# Patient Record
Sex: MALE | Race: WHITE | NOT HISPANIC OR LATINO
[De-identification: names, ages, dates, MRNs, and addresses within clinical notes are randomized per-mention and may not be internally consistent; named-entity substitution may affect disease eponyms.]

---

## 2017-02-01 ENCOUNTER — RECORD ABSTRACTING (OUTPATIENT)
Age: 82
End: 2017-02-01

## 2017-02-01 DIAGNOSIS — I51.9 HEART DISEASE, UNSPECIFIED: ICD-10-CM

## 2017-02-01 DIAGNOSIS — Z85.828 PERSONAL HISTORY OF OTHER MALIGNANT NEOPLASM OF SKIN: ICD-10-CM

## 2017-02-01 DIAGNOSIS — I10 ESSENTIAL (PRIMARY) HYPERTENSION: ICD-10-CM

## 2017-02-01 DIAGNOSIS — E78.5 HYPERLIPIDEMIA, UNSPECIFIED: ICD-10-CM

## 2017-02-01 DIAGNOSIS — Z78.9 OTHER SPECIFIED HEALTH STATUS: ICD-10-CM

## 2017-02-01 RX ORDER — FAMOTIDINE 40 MG/1
TABLET, FILM COATED ORAL
Refills: 0 | Status: ACTIVE | COMMUNITY

## 2017-02-01 RX ORDER — LEVOTHYROXINE SODIUM 0.17 MG/1
TABLET ORAL
Refills: 0 | Status: ACTIVE | COMMUNITY

## 2017-02-01 RX ORDER — ROSUVASTATIN CALCIUM 5 MG/1
TABLET, FILM COATED ORAL
Refills: 0 | Status: ACTIVE | COMMUNITY

## 2017-02-01 RX ORDER — MULTIVITAMIN
TABLET ORAL
Refills: 0 | Status: ACTIVE | COMMUNITY

## 2017-02-01 RX ORDER — FINASTERIDE 5 MG/1
TABLET, FILM COATED ORAL
Refills: 0 | Status: ACTIVE | COMMUNITY

## 2017-02-01 RX ORDER — ASPIRIN 325 MG/1
TABLET, FILM COATED ORAL
Refills: 0 | Status: ACTIVE | COMMUNITY

## 2017-02-01 RX ORDER — VALSARTAN 40 MG/1
TABLET ORAL
Refills: 0 | Status: ACTIVE | COMMUNITY

## 2017-02-01 RX ORDER — ALPRAZOLAM 2 MG/1
TABLET ORAL
Refills: 0 | Status: ACTIVE | COMMUNITY

## 2017-02-01 RX ORDER — SERTRALINE HYDROCHLORIDE 25 MG/1
TABLET, FILM COATED ORAL
Refills: 0 | Status: ACTIVE | COMMUNITY

## 2017-02-15 ENCOUNTER — APPOINTMENT (OUTPATIENT)
Dept: UROLOGY | Facility: CLINIC | Age: 82
End: 2017-02-15

## 2017-03-08 ENCOUNTER — APPOINTMENT (OUTPATIENT)
Dept: DERMATOLOGY | Facility: CLINIC | Age: 82
End: 2017-03-08

## 2017-03-08 RX ORDER — RANOLAZINE 500 MG/1
500 TABLET, FILM COATED, EXTENDED RELEASE ORAL
Refills: 0 | Status: ACTIVE | COMMUNITY

## 2017-03-08 RX ORDER — MULTIVIT-MIN/FOLIC/VIT K/LYCOP 400-300MCG
25 MCG TABLET ORAL
Refills: 0 | Status: ACTIVE | COMMUNITY

## 2017-05-13 ENCOUNTER — RX RENEWAL (OUTPATIENT)
Age: 82
End: 2017-05-13

## 2017-06-16 LAB
APPEARANCE: CLEAR
BACTERIA: NEGATIVE
BILIRUBIN URINE: NEGATIVE
BLOOD URINE: NEGATIVE
COLOR: YELLOW
CORE LAB FLUID CYTOLOGY: NORMAL
GLUCOSE QUALITATIVE U: NORMAL MG/DL
KETONES URINE: NEGATIVE
LEUKOCYTE ESTERASE URINE: NEGATIVE
MICROSCOPIC-UA: NORMAL
NITRITE URINE: NEGATIVE
PH URINE: 6.5
PROTEIN URINE: NEGATIVE MG/DL
PSA SERPL-MCNC: 0.95 NG/ML
RED BLOOD CELLS URINE: 6 /HPF
SPECIFIC GRAVITY URINE: 1.02
SQUAMOUS EPITHELIAL CELLS: 0 /HPF
UROBILINOGEN URINE: NORMAL MG/DL
WHITE BLOOD CELLS URINE: 0 /HPF

## 2017-06-23 ENCOUNTER — APPOINTMENT (OUTPATIENT)
Dept: UROLOGY | Facility: CLINIC | Age: 82
End: 2017-06-23

## 2017-06-23 VITALS
DIASTOLIC BLOOD PRESSURE: 60 MMHG | WEIGHT: 115 LBS | OXYGEN SATURATION: 89 % | HEIGHT: 64 IN | HEART RATE: 79 BPM | TEMPERATURE: 97.9 F | SYSTOLIC BLOOD PRESSURE: 103 MMHG | BODY MASS INDEX: 19.63 KG/M2

## 2017-06-23 DIAGNOSIS — R31.9 HEMATURIA, UNSPECIFIED: ICD-10-CM

## 2017-06-26 PROBLEM — R31.9 HEMATURIA: Status: ACTIVE | Noted: 2017-06-26

## 2017-07-08 LAB
ANION GAP SERPL CALC-SCNC: 16 MMOL/L
APPEARANCE: CLEAR
BACTERIA UR CULT: NORMAL
BACTERIA: NEGATIVE
BILIRUBIN URINE: NEGATIVE
BLOOD URINE: NEGATIVE
BUN SERPL-MCNC: 29 MG/DL
CALCIUM SERPL-MCNC: 8.8 MG/DL
CHLORIDE SERPL-SCNC: 105 MMOL/L
CO2 SERPL-SCNC: 23 MMOL/L
COLOR: YELLOW
CORE LAB FLUID CYTOLOGY: NORMAL
CREAT SERPL-MCNC: 1.36 MG/DL
GLUCOSE QUALITATIVE U: NORMAL MG/DL
GLUCOSE SERPL-MCNC: 83 MG/DL
HYALINE CASTS: 1 /LPF
KETONES URINE: NEGATIVE
LEUKOCYTE ESTERASE URINE: NEGATIVE
MICROSCOPIC-UA: NORMAL
NITRITE URINE: NEGATIVE
PH URINE: 7
POTASSIUM SERPL-SCNC: 4.4 MMOL/L
PROTEIN URINE: NEGATIVE MG/DL
RED BLOOD CELLS URINE: 4 /HPF
SODIUM SERPL-SCNC: 144 MMOL/L
SPECIFIC GRAVITY URINE: 1.01
SQUAMOUS EPITHELIAL CELLS: 0 /HPF
UROBILINOGEN URINE: NORMAL MG/DL
WHITE BLOOD CELLS URINE: 0 /HPF

## 2017-09-06 ENCOUNTER — APPOINTMENT (OUTPATIENT)
Dept: DERMATOLOGY | Facility: CLINIC | Age: 82
End: 2017-09-06
Payer: MEDICARE

## 2017-09-06 DIAGNOSIS — L82.1 OTHER SEBORRHEIC KERATOSIS: ICD-10-CM

## 2017-09-06 PROCEDURE — 17000 DESTRUCT PREMALG LESION: CPT

## 2017-09-06 PROCEDURE — 99213 OFFICE O/P EST LOW 20 MIN: CPT | Mod: 25

## 2017-09-21 ENCOUNTER — MEDICATION RENEWAL (OUTPATIENT)
Age: 82
End: 2017-09-21

## 2017-09-23 ENCOUNTER — RX RENEWAL (OUTPATIENT)
Age: 82
End: 2017-09-23

## 2017-09-25 ENCOUNTER — APPOINTMENT (OUTPATIENT)
Dept: UROLOGY | Facility: CLINIC | Age: 82
End: 2017-09-25
Payer: MEDICARE

## 2017-09-25 PROCEDURE — 96402 CHEMO HORMON ANTINEOPL SQ/IM: CPT

## 2017-09-30 LAB
CORE LAB FLUID CYTOLOGY: NORMAL
PSA SERPL-MCNC: 1.69 NG/ML

## 2017-10-04 ENCOUNTER — RX RENEWAL (OUTPATIENT)
Age: 82
End: 2017-10-04

## 2017-12-26 ENCOUNTER — APPOINTMENT (OUTPATIENT)
Dept: UROLOGY | Facility: CLINIC | Age: 82
End: 2017-12-26
Payer: MEDICARE

## 2017-12-26 DIAGNOSIS — C61 MALIGNANT NEOPLASM OF PROSTATE: ICD-10-CM

## 2017-12-26 PROCEDURE — 99213 OFFICE O/P EST LOW 20 MIN: CPT | Mod: 25

## 2017-12-26 PROCEDURE — 96402 CHEMO HORMON ANTINEOPL SQ/IM: CPT

## 2018-01-18 ENCOUNTER — MEDICATION RENEWAL (OUTPATIENT)
Age: 83
End: 2018-01-18

## 2018-01-19 ENCOUNTER — RX RENEWAL (OUTPATIENT)
Age: 83
End: 2018-01-19

## 2018-03-16 ENCOUNTER — APPOINTMENT (OUTPATIENT)
Dept: DERMATOLOGY | Facility: CLINIC | Age: 83
End: 2018-03-16
Payer: MEDICARE

## 2018-03-16 VITALS — WEIGHT: 125 LBS | HEIGHT: 64 IN | BODY MASS INDEX: 21.34 KG/M2

## 2018-03-16 DIAGNOSIS — Z86.79 PERSONAL HISTORY OF OTHER DISEASES OF THE CIRCULATORY SYSTEM: ICD-10-CM

## 2018-03-16 DIAGNOSIS — Z85.820 PERSONAL HISTORY OF MALIGNANT MELANOMA OF SKIN: ICD-10-CM

## 2018-03-16 DIAGNOSIS — L57.8 OTHER SKIN CHANGES DUE TO CHRONIC EXPOSURE TO NONIONIZING RADIATION: ICD-10-CM

## 2018-03-16 DIAGNOSIS — L81.4 OTHER MELANIN HYPERPIGMENTATION: ICD-10-CM

## 2018-03-16 DIAGNOSIS — F41.8 OTHER SPECIFIED ANXIETY DISORDERS: ICD-10-CM

## 2018-03-16 DIAGNOSIS — Z85.46 PERSONAL HISTORY OF MALIGNANT NEOPLASM OF PROSTATE: ICD-10-CM

## 2018-03-16 DIAGNOSIS — L57.0 ACTINIC KERATOSIS: ICD-10-CM

## 2018-03-16 DIAGNOSIS — Z86.718 PERSONAL HISTORY OF OTHER VENOUS THROMBOSIS AND EMBOLISM: ICD-10-CM

## 2018-03-16 PROCEDURE — 99213 OFFICE O/P EST LOW 20 MIN: CPT | Mod: 25

## 2018-03-16 PROCEDURE — 17000 DESTRUCT PREMALG LESION: CPT

## 2018-03-16 PROCEDURE — 17003 DESTRUCT PREMALG LES 2-14: CPT

## 2018-03-16 RX ORDER — SERTRALINE HYDROCHLORIDE 100 MG/1
100 TABLET, FILM COATED ORAL
Qty: 30 | Refills: 0 | Status: DISCONTINUED | COMMUNITY
Start: 2017-09-17

## 2018-03-16 RX ORDER — LOTEPREDNOL ETABONATE 2 MG/ML
0.2 SUSPENSION/ DROPS OPHTHALMIC
Qty: 5 | Refills: 0 | Status: ACTIVE | COMMUNITY
Start: 2017-09-28

## 2018-03-16 RX ORDER — ALPRAZOLAM 0.25 MG/1
0.25 TABLET ORAL
Qty: 90 | Refills: 0 | Status: ACTIVE | COMMUNITY
Start: 2017-09-21

## 2018-03-16 RX ORDER — PANTOPRAZOLE 40 MG/1
40 TABLET, DELAYED RELEASE ORAL
Qty: 90 | Refills: 0 | Status: ACTIVE | COMMUNITY
Start: 2017-09-19

## 2018-03-16 RX ORDER — VALSARTAN 160 MG/1
160 TABLET, COATED ORAL
Qty: 90 | Refills: 0 | Status: DISCONTINUED | COMMUNITY
Start: 2018-01-19

## 2018-03-16 RX ORDER — BETHANECHOL CHLORIDE 10 MG/1
10 TABLET ORAL
Qty: 60 | Refills: 4 | Status: DISCONTINUED | COMMUNITY
Start: 2017-05-13 | End: 2018-03-16

## 2018-03-16 RX ORDER — OMEPRAZOLE 20 MG/1
TABLET, DELAYED RELEASE ORAL
Refills: 0 | Status: DISCONTINUED | COMMUNITY
End: 2018-03-16

## 2018-03-16 RX ORDER — AZELASTINE HYDROCHLORIDE 137 UG/1
0.1 SPRAY, METERED NASAL
Qty: 30 | Refills: 0 | Status: ACTIVE | COMMUNITY
Start: 2017-09-15

## 2018-03-16 RX ORDER — ROSUVASTATIN CALCIUM 20 MG/1
20 TABLET, FILM COATED ORAL
Qty: 90 | Refills: 0 | Status: DISCONTINUED | COMMUNITY
Start: 2017-09-26

## 2018-04-16 ENCOUNTER — RX RENEWAL (OUTPATIENT)
Age: 83
End: 2018-04-16

## 2018-07-21 ENCOUNTER — RX RENEWAL (OUTPATIENT)
Age: 83
End: 2018-07-21

## 2018-07-26 PROBLEM — Z85.828 HISTORY OF BASAL CELL CARCINOMA: Status: RESOLVED | Noted: 2017-02-01 | Resolved: 2018-07-26

## 2018-09-12 ENCOUNTER — RX RENEWAL (OUTPATIENT)
Age: 83
End: 2018-09-12

## 2018-09-12 RX ORDER — BETHANECHOL CHLORIDE 50 MG/1
TABLET ORAL
Refills: 0 | Status: DISCONTINUED | COMMUNITY
End: 2018-09-12

## 2018-09-14 ENCOUNTER — APPOINTMENT (OUTPATIENT)
Dept: DERMATOLOGY | Facility: CLINIC | Age: 83
End: 2018-09-14

## 2018-10-18 ENCOUNTER — RX RENEWAL (OUTPATIENT)
Age: 83
End: 2018-10-18

## 2018-10-18 RX ORDER — BETHANECHOL CHLORIDE 25 MG/1
25 TABLET ORAL
Qty: 180 | Refills: 0 | Status: ACTIVE | COMMUNITY
Start: 2017-05-12 | End: 1900-01-01

## 2018-12-13 ENCOUNTER — RX RENEWAL (OUTPATIENT)
Age: 83
End: 2018-12-13

## 2018-12-13 RX ORDER — FINASTERIDE 5 MG/1
5 TABLET, FILM COATED ORAL DAILY
Qty: 90 | Refills: 0 | Status: ACTIVE | COMMUNITY
Start: 2017-09-21 | End: 1900-01-01

## 2019-01-23 ENCOUNTER — INPATIENT (INPATIENT)
Facility: HOSPITAL | Age: 84
LOS: 4 days | Discharge: NURSING, REHAB OR LONG-TERM CARE | End: 2019-01-28
Attending: HOSPITALIST | Admitting: HOSPITALIST
Payer: MEDICARE

## 2019-01-23 VITALS
TEMPERATURE: 98 F | DIASTOLIC BLOOD PRESSURE: 92 MMHG | HEIGHT: 61 IN | HEART RATE: 90 BPM | OXYGEN SATURATION: 99 % | SYSTOLIC BLOOD PRESSURE: 137 MMHG | RESPIRATION RATE: 18 BRPM | WEIGHT: 98.11 LBS

## 2019-01-23 LAB
ALBUMIN SERPL ELPH-MCNC: 3.5 G/DL — SIGNIFICANT CHANGE UP (ref 3.3–5)
ALP SERPL-CCNC: 62 U/L — SIGNIFICANT CHANGE UP (ref 40–120)
ALT FLD-CCNC: 36 U/L — SIGNIFICANT CHANGE UP (ref 12–78)
ANION GAP SERPL CALC-SCNC: 8 MMOL/L — SIGNIFICANT CHANGE UP (ref 5–17)
APPEARANCE UR: CLEAR — SIGNIFICANT CHANGE UP
AST SERPL-CCNC: 58 U/L — HIGH (ref 15–37)
BACTERIA # UR AUTO: ABNORMAL
BASOPHILS # BLD AUTO: 0.01 K/UL — SIGNIFICANT CHANGE UP (ref 0–0.2)
BASOPHILS NFR BLD AUTO: 0.1 % — SIGNIFICANT CHANGE UP (ref 0–2)
BILIRUB SERPL-MCNC: 0.8 MG/DL — SIGNIFICANT CHANGE UP (ref 0.2–1.2)
BILIRUB UR-MCNC: ABNORMAL
BUN SERPL-MCNC: 55 MG/DL — HIGH (ref 7–23)
CALCIUM SERPL-MCNC: 8.8 MG/DL — SIGNIFICANT CHANGE UP (ref 8.5–10.1)
CHLORIDE SERPL-SCNC: 104 MMOL/L — SIGNIFICANT CHANGE UP (ref 96–108)
CO2 SERPL-SCNC: 25 MMOL/L — SIGNIFICANT CHANGE UP (ref 22–31)
COLOR SPEC: YELLOW — SIGNIFICANT CHANGE UP
COMMENT - URINE: SIGNIFICANT CHANGE UP
CREAT SERPL-MCNC: 3.08 MG/DL — HIGH (ref 0.5–1.3)
DIFF PNL FLD: ABNORMAL
EOSINOPHIL # BLD AUTO: 0 K/UL — SIGNIFICANT CHANGE UP (ref 0–0.5)
EOSINOPHIL NFR BLD AUTO: 0 % — SIGNIFICANT CHANGE UP (ref 0–6)
EPI CELLS # UR: SIGNIFICANT CHANGE UP
GLUCOSE SERPL-MCNC: 196 MG/DL — HIGH (ref 70–99)
GLUCOSE UR QL: NEGATIVE MG/DL — SIGNIFICANT CHANGE UP
HCT VFR BLD CALC: 29.4 % — LOW (ref 39–50)
HGB BLD-MCNC: 9.5 G/DL — LOW (ref 13–17)
HYPOCHROMIA BLD QL: SLIGHT — SIGNIFICANT CHANGE UP
IMM GRANULOCYTES NFR BLD AUTO: 0.6 % — SIGNIFICANT CHANGE UP (ref 0–1.5)
KETONES UR-MCNC: ABNORMAL
LEUKOCYTE ESTERASE UR-ACNC: ABNORMAL
LYMPHOCYTES # BLD AUTO: 0.43 K/UL — LOW (ref 1–3.3)
LYMPHOCYTES # BLD AUTO: 4.7 % — LOW (ref 13–44)
MACROCYTES BLD QL: SLIGHT — SIGNIFICANT CHANGE UP
MANUAL SMEAR VERIFICATION: SIGNIFICANT CHANGE UP
MCHC RBC-ENTMCNC: 32.3 GM/DL — SIGNIFICANT CHANGE UP (ref 32–36)
MCHC RBC-ENTMCNC: 32.8 PG — SIGNIFICANT CHANGE UP (ref 27–34)
MCV RBC AUTO: 101.4 FL — HIGH (ref 80–100)
MONOCYTES # BLD AUTO: 0.47 K/UL — SIGNIFICANT CHANGE UP (ref 0–0.9)
MONOCYTES NFR BLD AUTO: 5.2 % — SIGNIFICANT CHANGE UP (ref 2–14)
NEUTROPHILS # BLD AUTO: 8.12 K/UL — HIGH (ref 1.8–7.4)
NEUTROPHILS NFR BLD AUTO: 89.4 % — HIGH (ref 43–77)
NITRITE UR-MCNC: POSITIVE
NRBC # BLD: 0 /100 WBCS — SIGNIFICANT CHANGE UP (ref 0–0)
PH UR: 5 — SIGNIFICANT CHANGE UP (ref 5–8)
PLAT MORPH BLD: NORMAL — SIGNIFICANT CHANGE UP
PLATELET # BLD AUTO: 175 K/UL — SIGNIFICANT CHANGE UP (ref 150–400)
POTASSIUM SERPL-MCNC: 4.3 MMOL/L — SIGNIFICANT CHANGE UP (ref 3.5–5.3)
POTASSIUM SERPL-SCNC: 4.3 MMOL/L — SIGNIFICANT CHANGE UP (ref 3.5–5.3)
PROT SERPL-MCNC: 7.4 GM/DL — SIGNIFICANT CHANGE UP (ref 6–8.3)
PROT UR-MCNC: 30 MG/DL
RBC # BLD: 2.9 M/UL — LOW (ref 4.2–5.8)
RBC # FLD: 14.5 % — SIGNIFICANT CHANGE UP (ref 10.3–14.5)
RBC BLD AUTO: ABNORMAL
RBC CASTS # UR COMP ASSIST: SIGNIFICANT CHANGE UP /HPF (ref 0–4)
SODIUM SERPL-SCNC: 137 MMOL/L — SIGNIFICANT CHANGE UP (ref 135–145)
SP GR SPEC: 1.02 — SIGNIFICANT CHANGE UP (ref 1.01–1.02)
TROPONIN I SERPL-MCNC: 0.09 NG/ML — HIGH (ref 0.01–0.04)
UROBILINOGEN FLD QL: 1 MG/DL
WBC # BLD: 9.08 K/UL — SIGNIFICANT CHANGE UP (ref 3.8–10.5)
WBC # FLD AUTO: 9.08 K/UL — SIGNIFICANT CHANGE UP (ref 3.8–10.5)
WBC UR QL: SIGNIFICANT CHANGE UP

## 2019-01-23 PROCEDURE — 72170 X-RAY EXAM OF PELVIS: CPT | Mod: 26

## 2019-01-23 PROCEDURE — 70450 CT HEAD/BRAIN W/O DYE: CPT | Mod: 26

## 2019-01-23 PROCEDURE — 93010 ELECTROCARDIOGRAM REPORT: CPT

## 2019-01-23 PROCEDURE — 71045 X-RAY EXAM CHEST 1 VIEW: CPT | Mod: 26

## 2019-01-23 PROCEDURE — 99285 EMERGENCY DEPT VISIT HI MDM: CPT

## 2019-01-23 RX ORDER — AZELASTINE 137 UG/1
2 SPRAY, METERED NASAL
Qty: 0 | Refills: 0 | COMMUNITY

## 2019-01-23 RX ORDER — FINASTERIDE 5 MG/1
5 TABLET, FILM COATED ORAL DAILY
Qty: 0 | Refills: 0 | Status: DISCONTINUED | OUTPATIENT
Start: 2019-01-23 | End: 2019-01-28

## 2019-01-23 RX ORDER — CEFTRIAXONE 500 MG/1
1000 INJECTION, POWDER, FOR SOLUTION INTRAMUSCULAR; INTRAVENOUS EVERY 24 HOURS
Qty: 0 | Refills: 0 | Status: DISCONTINUED | OUTPATIENT
Start: 2019-01-24 | End: 2019-01-28

## 2019-01-23 RX ORDER — RANOLAZINE 500 MG/1
1 TABLET, FILM COATED, EXTENDED RELEASE ORAL
Qty: 0 | Refills: 0 | COMMUNITY

## 2019-01-23 RX ORDER — BETHANECHOL CHLORIDE 25 MG
1 TABLET ORAL
Qty: 0 | Refills: 0 | COMMUNITY

## 2019-01-23 RX ORDER — SERTRALINE 25 MG/1
100 TABLET, FILM COATED ORAL DAILY
Qty: 0 | Refills: 0 | Status: DISCONTINUED | OUTPATIENT
Start: 2019-01-23 | End: 2019-01-28

## 2019-01-23 RX ORDER — LOTEPREDNOL ETABONATE 2 MG/ML
1 SUSPENSION/ DROPS OPHTHALMIC DAILY
Qty: 0 | Refills: 0 | Status: DISCONTINUED | OUTPATIENT
Start: 2019-01-23 | End: 2019-01-28

## 2019-01-23 RX ORDER — PREGABALIN 225 MG/1
1 CAPSULE ORAL
Qty: 0 | Refills: 0 | COMMUNITY

## 2019-01-23 RX ORDER — CHOLECALCIFEROL (VITAMIN D3) 125 MCG
1000 CAPSULE ORAL DAILY
Qty: 0 | Refills: 0 | Status: DISCONTINUED | OUTPATIENT
Start: 2019-01-23 | End: 2019-01-28

## 2019-01-23 RX ORDER — CEFTRIAXONE 500 MG/1
1000 INJECTION, POWDER, FOR SOLUTION INTRAMUSCULAR; INTRAVENOUS ONCE
Qty: 0 | Refills: 0 | Status: COMPLETED | OUTPATIENT
Start: 2019-01-23 | End: 2019-01-23

## 2019-01-23 RX ORDER — BETHANECHOL CHLORIDE 25 MG
25 TABLET ORAL
Qty: 0 | Refills: 0 | Status: DISCONTINUED | OUTPATIENT
Start: 2019-01-23 | End: 2019-01-28

## 2019-01-23 RX ORDER — LOTEPREDNOL ETABONATE 2 MG/ML
1 SUSPENSION/ DROPS OPHTHALMIC
Qty: 0 | Refills: 0 | COMMUNITY

## 2019-01-23 RX ORDER — AZELASTINE 137 UG/1
2 SPRAY, METERED NASAL
Qty: 0 | Refills: 0 | Status: DISCONTINUED | OUTPATIENT
Start: 2019-01-23 | End: 2019-01-28

## 2019-01-23 RX ORDER — CEFTRIAXONE 500 MG/1
INJECTION, POWDER, FOR SOLUTION INTRAMUSCULAR; INTRAVENOUS
Qty: 0 | Refills: 0 | Status: DISCONTINUED | OUTPATIENT
Start: 2019-01-23 | End: 2019-01-28

## 2019-01-23 RX ORDER — ASPIRIN/CALCIUM CARB/MAGNESIUM 324 MG
81 TABLET ORAL DAILY
Qty: 0 | Refills: 0 | Status: DISCONTINUED | OUTPATIENT
Start: 2019-01-23 | End: 2019-01-24

## 2019-01-23 RX ORDER — LEVOTHYROXINE SODIUM 125 MCG
1 TABLET ORAL
Qty: 0 | Refills: 0 | COMMUNITY

## 2019-01-23 RX ORDER — FINASTERIDE 5 MG/1
1 TABLET, FILM COATED ORAL
Qty: 0 | Refills: 0 | COMMUNITY

## 2019-01-23 RX ORDER — SODIUM CHLORIDE 9 MG/ML
500 INJECTION INTRAMUSCULAR; INTRAVENOUS; SUBCUTANEOUS ONCE
Qty: 0 | Refills: 0 | Status: COMPLETED | OUTPATIENT
Start: 2019-01-23 | End: 2019-01-23

## 2019-01-23 RX ORDER — PANTOPRAZOLE SODIUM 20 MG/1
1 TABLET, DELAYED RELEASE ORAL
Qty: 0 | Refills: 0 | COMMUNITY

## 2019-01-23 RX ORDER — CHOLECALCIFEROL (VITAMIN D3) 125 MCG
1 CAPSULE ORAL
Qty: 0 | Refills: 0 | COMMUNITY

## 2019-01-23 RX ORDER — PANTOPRAZOLE SODIUM 20 MG/1
40 TABLET, DELAYED RELEASE ORAL
Qty: 0 | Refills: 0 | Status: DISCONTINUED | OUTPATIENT
Start: 2019-01-23 | End: 2019-01-28

## 2019-01-23 RX ORDER — ALPRAZOLAM 0.25 MG
0.25 TABLET ORAL
Qty: 0 | Refills: 0 | Status: DISCONTINUED | OUTPATIENT
Start: 2019-01-23 | End: 2019-01-28

## 2019-01-23 RX ORDER — LEVOTHYROXINE SODIUM 125 MCG
75 TABLET ORAL DAILY
Qty: 0 | Refills: 0 | Status: DISCONTINUED | OUTPATIENT
Start: 2019-01-23 | End: 2019-01-28

## 2019-01-23 RX ORDER — SODIUM CHLORIDE 9 MG/ML
1000 INJECTION INTRAMUSCULAR; INTRAVENOUS; SUBCUTANEOUS
Qty: 0 | Refills: 0 | Status: COMPLETED | OUTPATIENT
Start: 2019-01-23 | End: 2019-01-24

## 2019-01-23 RX ORDER — ATORVASTATIN CALCIUM 80 MG/1
80 TABLET, FILM COATED ORAL AT BEDTIME
Qty: 0 | Refills: 0 | Status: DISCONTINUED | OUTPATIENT
Start: 2019-01-23 | End: 2019-01-28

## 2019-01-23 RX ORDER — RANOLAZINE 500 MG/1
1000 TABLET, FILM COATED, EXTENDED RELEASE ORAL
Qty: 0 | Refills: 0 | Status: DISCONTINUED | OUTPATIENT
Start: 2019-01-23 | End: 2019-01-28

## 2019-01-23 RX ORDER — ALPRAZOLAM 0.25 MG
0.25 TABLET ORAL DAILY
Qty: 0 | Refills: 0 | Status: DISCONTINUED | OUTPATIENT
Start: 2019-01-23 | End: 2019-01-28

## 2019-01-23 RX ORDER — SERTRALINE 25 MG/1
1 TABLET, FILM COATED ORAL
Qty: 0 | Refills: 0 | COMMUNITY

## 2019-01-23 RX ADMIN — SODIUM CHLORIDE 500 MILLILITER(S): 9 INJECTION INTRAMUSCULAR; INTRAVENOUS; SUBCUTANEOUS at 18:05

## 2019-01-23 RX ADMIN — CEFTRIAXONE 1000 MILLIGRAM(S): 500 INJECTION, POWDER, FOR SOLUTION INTRAMUSCULAR; INTRAVENOUS at 21:30

## 2019-01-23 NOTE — ED ADULT NURSE REASSESSMENT NOTE - NS ED NURSE REASSESS COMMENT FT1
pt family at bedside, pt a/ox1- confused to time/place. RN Davion turned & repositioned. pt safety maintained, fall precautions in place. will continue to monitor. Alexis

## 2019-01-23 NOTE — H&P ADULT - ASSESSMENT
89 y.o. male with PMH HTN, HLD, nonobstructive CAD, carotid stenosis 99%, skin ca s/p surgery, anxiety, BPH, prostate ca with injection (25 yr ago), hypothyroidism presents with slurred speech and difficulty talking. Pt's daughter Tarsha at bedside with son in law and grand daughter. Pt usually speaks with daughter on daily basis for at least 30 minutes. Today, while talking, pt appears to comprehend, but unable to speak. Pt also with slurred speech. EMS was contacted and per ED note, pt had ?seizure while on the ambulance. Pt denies fever, chills, CP, SOB, abd pain, dysuria, or diarrhea. Pt reports right sided hip pain that is not new. Currently, pt's daughter states the symptoms is almost back to normal. Per daughter, pt initially unable to recognize her and only later would he recognize her. Pt baseline walks walker/cane. Does all ADLs including driving. Pt does have frequent falls recently.     #slurred speech, difficulty speaking concerning for TIA, possible seizure  #confusional state possible toxic encephalopathy vs TIA  -admit to tele  -MRI/A brain  -CT head noted  -EEG  -neuro checks  -neuro consult, Dr Douglas    #CAD with elevated troponin  -likely from NEVA vs cardiac  -serial cardiac enzymes and EKG  -cont ASA, statin  -lipid panel  -cardio consult, Dr Lorenzo    #NEVA/dehydration  #possible UTI  -IV hydration and monitor renal function  -f/u urine culture  -will start ceftriaxone for now and await UCx  -hold ARB    #CAD, carotid stenosis  -cont ASA, statin  -cardio consult    #hypothyroidism  -cont home med    #Anemia  -monitor H/H  -denies GI bleed    #DVT ppx  -SCDs    #advanced care planning  -discussed with pt's daughter Tarsha ([phone], [phone]) regarding advanced directives/code status  -pt with living will and HCP files with her. Files reviewed. Pt's living will states DNR/DNI  -Pt is DNR/DNI  -time: 20 min

## 2019-01-23 NOTE — INPATIENT CERTIFICATION FOR MEDICARE PATIENTS - RISKS OF ADVERSE EVENTS
Concern for neurologic deterioration/Concern for renal deterioration/Concern for cardiopulmonary deterioration/Concern for delay in diagnosis and treatment

## 2019-01-23 NOTE — ED ADULT NURSE NOTE - OBJECTIVE STATEMENT
Pt with increasing AMS over last week, 2 witnessed seizures by EMS.  Pt sleepy but arousable and confused. EMS attempted IV in right forearm, large hematoma noted.

## 2019-01-23 NOTE — ED ADULT TRIAGE NOTE - CHIEF COMPLAINT QUOTE
pt BIBEMS from home for AMS worsening x 1 wk as noticed by daughter. pt had 2 seizures witnessed by EMS en route each lasting less than 1 minute, tremulous activity as per EMS. no head strike, no tongue biting. no hx of seizures. hx of carotid blockage as per daughter.

## 2019-01-23 NOTE — ED ADULT NURSE REASSESSMENT NOTE - NS ED NURSE REASSESS COMMENT FT1
Received report from Stella Singh @ [id number]. Assisted patient with food, family at bedside, awaiting bed placement. Will continue to monitor

## 2019-01-23 NOTE — H&P ADULT - NSHPPHYSICALEXAM_GEN_ALL_CORE
Vital Signs Last 24 Hrs  T(C): 36.8 (23 Jan 2019 14:09), Max: 36.8 (23 Jan 2019 14:09)  T(F): 98.2 (23 Jan 2019 14:09), Max: 98.2 (23 Jan 2019 14:09)  HR: 90 (23 Jan 2019 13:48) (90 - 90)  BP: 137/92 (23 Jan 2019 13:48) (137/92 - 137/92)  BP(mean): --  RR: 18 (23 Jan 2019 13:48) (18 - 18)  SpO2: 99% (23 Jan 2019 13:48) (99% - 99%)    GEN: appears comfortable  Neuro: Alert, conversant, CN grossly nonfocal at this time, no slurring, no facial droop, sensation intact, strength intact  HEENT: NC/AT, EOMI, lower eye lip everted, discharge  Neck: no thyroidmegaly, no JVD  Cardiovascular: S1S2 present, regular rhythm, II/VI systolic murmur  Respiratory: breath sounds normal bilaterally, no wheezing, no rales, no rhonchi  Gastrointestinal: bowel sounds normal, soft, no abdominal tenderness  Musculoskeletal: no muscle tenderness  Extremities: No edema  Skin: No rash

## 2019-01-23 NOTE — ED ADULT NURSE NOTE - NSIMPLEMENTINTERV_GEN_ALL_ED
Implemented All Fall with Harm Risk Interventions:  Frannie to call system. Call bell, personal items and telephone within reach. Instruct patient to call for assistance. Room bathroom lighting operational. Non-slip footwear when patient is off stretcher. Physically safe environment: no spills, clutter or unnecessary equipment. Stretcher in lowest position, wheels locked, appropriate side rails in place. Provide visual cue, wrist band, yellow gown, etc. Monitor gait and stability. Monitor for mental status changes and reorient to person, place, and time. Review medications for side effects contributing to fall risk. Reinforce activity limits and safety measures with patient and family. Provide visual clues: red socks.

## 2019-01-23 NOTE — ED PROVIDER NOTE - MEDICAL DECISION MAKING DETAILS
Pt presenting with AMS, no focal deficits on neuro exam. Will r/o infection vs. CVA vs. metabolic. Will need admission.

## 2019-01-23 NOTE — H&P ADULT - HISTORY OF PRESENT ILLNESS
89 y.o. male with PMH HTN, HLD, nonobstructive CAD, carotid stenosis 99%, skin ca s/p surgery, anxiety, BPH, prostate ca with injection (25 yr ago), hypothyroidism presents with slurred speech and difficulty talking. Pt's daughter Tarsha at bedside with son in law and grand daughter. Pt usually speaks with daughter on daily basis for at least 30 minutes. Today, while talking, pt appears to comprehend, but unable to speak. Pt also with slurred speech. EMS was contacted and per ED note, pt had ?seizure while on the ambulance. Pt denies fever, chills, CP, SOB, abd pain, dysuria, or diarrhea. Pt reports right sided hip pain that is not new. Currently, pt's daughter states the symptoms is almost back to normal. Per daughter, pt initially unable to recognize her and only later would he recognize her. Pt baseline walks walker/cane. Does all ADLs including driving. Pt does have frequent falls recently.     PMH: as above  PSH: skin surgery, eye lid surgery, hernia repair >30 yr ago  Social hx: denies tobacco, EtOH, drugs  Family hx: Father-throat ca, stroke,  age ~65; Mother-stomach ca,  age ~65  ROS: per HPI, poor historian

## 2019-01-23 NOTE — ED PROVIDER NOTE - OBJECTIVE STATEMENT
89 y/o male with PMHx of HTN, carotid stenosis, HLD, skin CA presents to the ED c/o difficulty speaking and AMS this morning. Daughter states that she called the patient this morning, as she does everyday and he told her that he did not feel well and was having difficulty speaking. Daughter reports frequent falls these last few days, not evaluated for falls. Pt also had chills last week which resolved. Daughter states that pt is normally alert and oriented, looks after himself and lives alone. Pt has a hx of dehydration. Denies pain, CP, SOB. NKDA. PCP- Dr. Sanchez. Full HPI unobtainable secondary to AMS.

## 2019-01-23 NOTE — ED ADULT NURSE NOTE - ED STAT RN HANDOFF DETAILS
report given to Padmini MARTINEZ. pt denies pain. vss. pt AOX3. no s/s of acute distress noted. awaiting transport

## 2019-01-23 NOTE — ED ADULT NURSE REASSESSMENT NOTE - NS ED NURSE REASSESS COMMENT FT1
pt resting comfortably in stretcher. denies pain. pt AOX3. family at bedside. report given to Padmini MARTINEZ 3N. awaiting transport.

## 2019-01-24 LAB
ANION GAP SERPL CALC-SCNC: 7 MMOL/L — SIGNIFICANT CHANGE UP (ref 5–17)
BUN SERPL-MCNC: 53 MG/DL — HIGH (ref 7–23)
CALCIUM SERPL-MCNC: 8.6 MG/DL — SIGNIFICANT CHANGE UP (ref 8.5–10.1)
CHLORIDE SERPL-SCNC: 109 MMOL/L — HIGH (ref 96–108)
CHOLEST SERPL-MCNC: 140 MG/DL — SIGNIFICANT CHANGE UP (ref 10–199)
CO2 SERPL-SCNC: 25 MMOL/L — SIGNIFICANT CHANGE UP (ref 22–31)
CREAT SERPL-MCNC: 2.21 MG/DL — HIGH (ref 0.5–1.3)
ESTIMATED AVERAGE GLUCOSE: 105 MG/DL — SIGNIFICANT CHANGE UP (ref 68–114)
GLUCOSE SERPL-MCNC: 104 MG/DL — HIGH (ref 70–99)
HBA1C BLD-MCNC: 5.3 % — SIGNIFICANT CHANGE UP (ref 4–5.6)
HBA1C BLD-MCNC: 5.4 % — SIGNIFICANT CHANGE UP (ref 4–5.6)
HCT VFR BLD CALC: 24.3 % — LOW (ref 39–50)
HDLC SERPL-MCNC: 75 MG/DL — SIGNIFICANT CHANGE UP
HGB BLD-MCNC: 8.1 G/DL — LOW (ref 13–17)
LIPID PNL WITH DIRECT LDL SERPL: 54 MG/DL — SIGNIFICANT CHANGE UP
MCHC RBC-ENTMCNC: 33.3 GM/DL — SIGNIFICANT CHANGE UP (ref 32–36)
MCHC RBC-ENTMCNC: 33.3 PG — SIGNIFICANT CHANGE UP (ref 27–34)
MCV RBC AUTO: 100 FL — SIGNIFICANT CHANGE UP (ref 80–100)
NRBC # BLD: 0 /100 WBCS — SIGNIFICANT CHANGE UP (ref 0–0)
PLATELET # BLD AUTO: 135 K/UL — LOW (ref 150–400)
POTASSIUM SERPL-MCNC: 3.9 MMOL/L — SIGNIFICANT CHANGE UP (ref 3.5–5.3)
POTASSIUM SERPL-SCNC: 3.9 MMOL/L — SIGNIFICANT CHANGE UP (ref 3.5–5.3)
RBC # BLD: 2.43 M/UL — LOW (ref 4.2–5.8)
RBC # FLD: 14.4 % — SIGNIFICANT CHANGE UP (ref 10.3–14.5)
SODIUM SERPL-SCNC: 141 MMOL/L — SIGNIFICANT CHANGE UP (ref 135–145)
TOTAL CHOLESTEROL/HDL RATIO MEASUREMENT: 1.9 RATIO — LOW (ref 3.4–9.6)
TRIGL SERPL-MCNC: 55 MG/DL — SIGNIFICANT CHANGE UP (ref 10–149)
TROPONIN I SERPL-MCNC: 0.14 NG/ML — HIGH (ref 0.01–0.04)
TROPONIN I SERPL-MCNC: 0.16 NG/ML — HIGH (ref 0.01–0.04)
WBC # BLD: 9.57 K/UL — SIGNIFICANT CHANGE UP (ref 3.8–10.5)
WBC # FLD AUTO: 9.57 K/UL — SIGNIFICANT CHANGE UP (ref 3.8–10.5)

## 2019-01-24 PROCEDURE — 70547 MR ANGIOGRAPHY NECK W/O DYE: CPT | Mod: 26

## 2019-01-24 PROCEDURE — 95819 EEG AWAKE AND ASLEEP: CPT | Mod: 26

## 2019-01-24 PROCEDURE — 72198 MR ANGIO PELVIS W/O & W/DYE: CPT | Mod: 26

## 2019-01-24 PROCEDURE — 99223 1ST HOSP IP/OBS HIGH 75: CPT

## 2019-01-24 PROCEDURE — 70551 MRI BRAIN STEM W/O DYE: CPT | Mod: 26

## 2019-01-24 PROCEDURE — 93010 ELECTROCARDIOGRAM REPORT: CPT

## 2019-01-24 RX ADMIN — SODIUM CHLORIDE 75 MILLILITER(S): 9 INJECTION INTRAMUSCULAR; INTRAVENOUS; SUBCUTANEOUS at 00:26

## 2019-01-24 RX ADMIN — Medication 0.25 MILLIGRAM(S): at 05:36

## 2019-01-24 RX ADMIN — Medication 1000 UNIT(S): at 12:24

## 2019-01-24 RX ADMIN — PANTOPRAZOLE SODIUM 40 MILLIGRAM(S): 20 TABLET, DELAYED RELEASE ORAL at 05:36

## 2019-01-24 RX ADMIN — Medication 0.25 MILLIGRAM(S): at 18:04

## 2019-01-24 RX ADMIN — RANOLAZINE 1000 MILLIGRAM(S): 500 TABLET, FILM COATED, EXTENDED RELEASE ORAL at 18:03

## 2019-01-24 RX ADMIN — Medication 81 MILLIGRAM(S): at 12:24

## 2019-01-24 RX ADMIN — FINASTERIDE 5 MILLIGRAM(S): 5 TABLET, FILM COATED ORAL at 12:24

## 2019-01-24 RX ADMIN — ATORVASTATIN CALCIUM 80 MILLIGRAM(S): 80 TABLET, FILM COATED ORAL at 22:50

## 2019-01-24 RX ADMIN — LOTEPREDNOL ETABONATE 1 DROP(S): 2 SUSPENSION/ DROPS OPHTHALMIC at 18:02

## 2019-01-24 RX ADMIN — CEFTRIAXONE 1000 MILLIGRAM(S): 500 INJECTION, POWDER, FOR SOLUTION INTRAMUSCULAR; INTRAVENOUS at 22:50

## 2019-01-24 RX ADMIN — SERTRALINE 100 MILLIGRAM(S): 25 TABLET, FILM COATED ORAL at 12:24

## 2019-01-24 RX ADMIN — AZELASTINE 2 SPRAY(S): 137 SPRAY, METERED NASAL at 18:01

## 2019-01-24 RX ADMIN — ATORVASTATIN CALCIUM 80 MILLIGRAM(S): 80 TABLET, FILM COATED ORAL at 00:17

## 2019-01-24 RX ADMIN — RANOLAZINE 1000 MILLIGRAM(S): 500 TABLET, FILM COATED, EXTENDED RELEASE ORAL at 05:36

## 2019-01-24 RX ADMIN — Medication 75 MICROGRAM(S): at 05:36

## 2019-01-24 RX ADMIN — Medication 25 MILLIGRAM(S): at 05:36

## 2019-01-24 RX ADMIN — Medication 25 MILLIGRAM(S): at 18:03

## 2019-01-24 NOTE — PHYSICAL THERAPY INITIAL EVALUATION ADULT - PERTINENT HX OF CURRENT PROBLEM, REHAB EVAL
presents with slurred speech and difficulty talking. Pt's daughter Tarsha at bedside with son in law and grand daughter. Pt usually speaks with daughter on daily basis for at least 30 minutes. Today, while talking, pt appears to comprehend, but unable to speak. Pt also with slurred speech. EMS was contacted and per ED note, pt had ?seizure while on the ambulance.

## 2019-01-24 NOTE — PROVIDER CONTACT NOTE (OTHER) - ASSESSMENT
Pt h&H also dropped down from 9.5 to 8.1 pt did receive 1 liter bolus and another liter running at 75mL/hr.  pt has no signs of bleeding.

## 2019-01-24 NOTE — PHYSICAL THERAPY INITIAL EVALUATION ADULT - ADDITIONAL COMMENTS
Pt baseline walks walker/cane. Does all ADLs including driving. Pt does have frequent falls recently. Pt baseline walks walker/cane. Does all ADLs including driving. Pt does have frequent falls recently.  Lives in a house alone. no ELIEZER. However laundry room in basement. Dtr provides transport to MD appt. Meals on wheels.

## 2019-01-24 NOTE — SWALLOW BEDSIDE ASSESSMENT ADULT - SWALLOW EVAL: RECOMMENDED DIET
SUGGEST A DASH/TLC SOFT TEXTURE DIET WITH THIN LIQUID CONSISTENCIES AS THIS DIET TYPE BEST ACCOMMODATES HIS OROPHARYNGEAL SWALLOWING PROFILE.

## 2019-01-24 NOTE — SWALLOW BEDSIDE ASSESSMENT ADULT - SWALLOW EVAL: CRITERIA FOR SKILLED INTERVENTION MET
ACUTE SPEECH PATHOLOGY INTERVENTION WOULD NOT CHANGE CLINICAL MANAGEMENT/OUTCOME. HIS ORAL PRESBYPHAGIA/MEMORY DEFICITS ARE FELT TO BE PRE-EXISTING. HE IS SUSPECTEDLY AT COMMUNICATIVE AND OROPHARYNGEAL SWALLOWING BASELINE. COMMUNICATIVE AND SWALLOW POTENTIAL ARE FELT TO BE MAXIMIZED. GIVEN ABOVE, WILL NOT ACTIVELY FOLLOW. RECONSULT PRN.

## 2019-01-24 NOTE — SWALLOW BEDSIDE ASSESSMENT ADULT - ORAL PHASE
Bolus formation/transfer were mildly prolonged but mechanically functional and acceptable for age. The pt cleared oral debris on his own after age acceptable piecemeal deglutition.

## 2019-01-24 NOTE — SWALLOW BEDSIDE ASSESSMENT ADULT - DIET PRIOR TO ADMI
The pt was reportedly on a soft texture diet with thin liquids at home PTH(i,e fish, pasta, etc), as per his daughter. The patient was reportedly on a soft texture diet with thin liquids at home PTH(i,e fish, pasta, etc), as per his daughter.

## 2019-01-24 NOTE — SWALLOW BEDSIDE ASSESSMENT ADULT - ORAL PREPARATORY PHASE
Pt willingly accepted PO and demonstrated functional labial grading on utensils. No dribbling was noted.

## 2019-01-24 NOTE — SWALLOW BEDSIDE ASSESSMENT ADULT - SWALLOW EVAL: DIAGNOSIS
1) The pt demonstrates mild functional Oropharyngeal Presbyphagia with oral presbyphagic features that may appear heightened at times with foods that require mastication in setting of many missing teeth. With that being stated, the patient exhibits sufficient oropharyngeal swallowing preservation for age(89) nonetheless. Oropharyngeal swallowing integrity appears to be stable/at usual state and NO behavioral aspiration signs were exhibited on exam.  2) The pt was alert and interactive but [place]. He was able to verbalize during communicative probes/in conversation while demonstrating functional motor speech abilities without evidence of an overt primary linguistic pathology. However, his speech integrity was somewhat hampered by his many missing teeth and his utterances reflected variably reduced STM which is chronic(memory deficits from dementia). In any case, he was able to verbalize his needs and family feels he is at communicative baseline.

## 2019-01-24 NOTE — CONSULT NOTE ADULT - ASSESSMENT
89 y/o M presented with slurred speech with hx of SATISH stenosis.    Plan;   obtain carotid duplex  continue ASA and statin   follow up cardiology work up   will review all imaging and discuss with family goals of care    Plan discussed with Dr jacobson

## 2019-01-24 NOTE — CONSULT NOTE ADULT - SUBJECTIVE AND OBJECTIVE BOX
Patient is a 89y old  Male who presents with a chief complaint of slurred speech, difficulty talking yesterday      HPI:  89 y.o. male with PMH HTN, HLD, nonobstructive CAD, carotid stenosis 99%, skin ca s/p surgery, anxiety, BPH, prostate ca with injection (25 yr ago), hypothyroidism presents with slurred speech and difficulty talking. Pt's daughter Tarsha at bedside with son in law and grand daughter. Pt usually speaks with daughter on daily basis for at least 30 minutes. Yesterday, while talking, pt appears to comprehend, but unable to speak. Pt also with slurred speech. EMS was contacted and per ED note, pt had ?seizure while on the ambulance. Pt denies fever, chills, CP, SOB, abd pain, dysuria, or diarrhea. Pt reports right sided hip pain that is not new. Currently, pt's daughter states the symptoms is almost back to normal. Per daughter, pt initially unable to recognize her and only later would he recognize her. Pt baseline walks walker/cane. Does all ADLs including driving. Pt does have frequent falls recently. Pt states his daughter takes him for shopping and h/o cardiac problems being followed by Dr. Lorenzo.     PMH: as above  PSH: skin surgery, eye lid surgery, hernia repair >30 yr ago  Social hx: denies tobacco, EtOH, drugs  Family hx: Father-throat ca, stroke,  age ~65; Mother-stomach ca,  age ~65  ROS: per HPI, poor historian (2019 19:06)      PAST MEDICAL & SURGICAL HISTORY:  Skin cancer  Carotid stenosis  HTN (hypertension)      FAMILY HISTORY:      Social Hx:  Nonsmoker, no drug or alcohol use    MEDICATIONS  (STANDING):  ALPRAZolam 0.25 milliGRAM(s) Oral two times a day  aspirin enteric coated 81 milliGRAM(s) Oral daily  atorvastatin 80 milliGRAM(s) Oral at bedtime  azelastine 0.1% Nasal Spray 1 Spray(s) Both Nostrils two times a day  bethanechol 25 milliGRAM(s) Oral two times a day  cefTRIAXone Injectable. 1000 milliGRAM(s) IV Push every 24 hours  cefTRIAXone Injectable.      cholecalciferol 1000 Unit(s) Oral daily  finasteride 5 milliGRAM(s) Oral daily  levothyroxine 75 MICROGram(s) Oral daily  loteprednol 0.2% Ophthalmic Suspension 1 Drop(s) Both EYES daily  pantoprazole    Tablet 40 milliGRAM(s) Oral before breakfast  ranolazine 1000 milliGRAM(s) Oral two times a day  sertraline 100 milliGRAM(s) Oral daily       Allergies    No Known Allergies    ROS: Pertinent positives in HPI, all other ROS were reviewed and are negative.      Vital Signs Last 24 Hrs  T(C): 36.5 (2019 04:50), Max: 36.8 (2019 14:09)  T(F): 97.7 (2019 04:50), Max: 98.2 (2019 14:09)  HR: 78 (2019 04:50) (65 - 90)  BP: 140/66 (2019 04:50) (131/52 - 140/66)  BP(mean): --  RR: 17 (2019 04:50) (16 - 18)  SpO2: 96% (2019 04:50) (95% - 100%)        Constitutional: awake and alert.  HEENT: PERRLA, EOMI,   Neck: Supple.  Respiratory: Breath sounds are clear bilaterally  Cardiovascular: S1 and S2, regular  rhythm  Gastrointestinal: soft, nontender  Extremities:  no edema  Vascular:  Carotid Bruit - no  Musculoskeletal: no joint swelling/tenderness, no abnormal movements  Skin: No rashes    Neurological exam:  HF: A x O x 3.Poor short term recall.  Appropriately interactive, normal affect. Speech fluent, No Aphasia now.  CN: OLGA, EOMI, VFF, facial sensation normal, no NLFD, tongue midline, gag intact.  Motor: No pronator drift, Strength 5/5 in all 4 ext, normal bulk and tone. finger contractures  in left hand    Sens: Intact to light touch   Reflexes: Symmetric and normal . BJ 2+, BR 2+, KJ 1+, AJ 0+, downgoing toes b/l  Coord:  No FNFA, dysmetria  Gait/Balance:  Cannot test    NIHSS: 0      Labs:       141  |  109<H>  |  53<H>  ----------------------------<  104<H>  3.9   |  25  |  2.21<H>    Ca    8.6      2019 04:16    TPro  7.4  /  Alb  3.5  /  TBili  0.8  /  DBili  x   /  AST  58<H>  /  ALT  36  /  AlkPhos  62                          8.1    9.57  )-----------( 135      ( 2019 04:16 )             24.3       Radiology:  - CT Head:    < from: CT Head No Cont (19 @ 14:38) >  IMPRESSION:    No acute intracranial hemorrhage, mass effect or midline shift.    Mild cerebral atrophy, advanced since the CT of 06.    Chronic mild bilateral cerebral white matter microvascular changes

## 2019-01-24 NOTE — SWALLOW BEDSIDE ASSESSMENT ADULT - COMMENTS
The patient was admitted to  with slurred speech which is improving. Neuro is following. This profile is superimposed upon a history of anxiety, memory loss/presumed dementia, CAD, HTN, HLD, carotid stenosis, hypothyroidism, skin cancer s/p surgery and prior prostate cancer/treatment NOS.

## 2019-01-24 NOTE — SWALLOW BEDSIDE ASSESSMENT ADULT - NS SPL SWALLOW CLINIC TRIAL FT
Odynophagia was denied and coarse solids were not offered given his baseline Oral Presbyphagia/many missing teeth.

## 2019-01-24 NOTE — PROGRESS NOTE ADULT - ASSESSMENT
89 y/o M with PMH HTN, HLD, nonobstructive CAD, carotid stenosis 99%, skin ca s/p surgery, anxiety, BPH, prostate ca with injection (25 yr ago), hypothyroidism presented with slurred speech and difficulty talking. Pt had elevated trops in the ED. Concern for TIA vs CVA.       #slurred speech, difficulty speaking concerning for TIA; CVA  #confusional state possible toxic encephalopathy vs TIA  - CT head noted above  - EEG negative, noted above.  - F/u MRI/A brain  - F/u speech eval  - Continue neuro checks  - Neuro and PT consult appreciated  - Will consider d/c to rehab once stable    #CAD with elevated troponin  - likely from NEVA vs Demand Ischemia  - Trop trend: 0.093 -> 0.160v-> 0.138  - serial EKG  - continue ASA, statin  - Lipid panel & A1c wnl  - Cardio consult appreciated    #NEVA likely 2/2 dehydration  - IV hydration and monitor renal function  - hold ARB  - Continue to monitor    #possible UTI  - F/u Urine cx  - Continue Ceftriaxone. Day 1    #CAD, carotid stenosis  - h/o severe AS and or SATISH stenosis. Pt wants conservative care  - cont ASA, statin  - cardio consult appreciated    #hypothyroidism  - cont synthroid    #Anemia likely dilutional  - Continue to monitor    #DVT ppx  -SCDs

## 2019-01-24 NOTE — PROGRESS NOTE ADULT - SUBJECTIVE AND OBJECTIVE BOX
SUBJECTIVE: 89 y/o M with PMH HTN, HLD, nonobstructive CAD, carotid stenosis 99%, skin ca s/p surgery, anxiety, BPH, prostate ca with injection (25 yr ago), hypothyroidism presented with slurred speech and difficulty talking with onset 1/23. As per pt daughter, Pt was able to comprehend but not speak. Pt began slurring his speech which was almost resolved back to baseline in the ED. There was no aggravating or palliating factors. Pt had an episode of ?seizure in the ambulance. Denies tongue biting, shaking, dizziness, lightheaded, abnormal smell or taste. Pt denies fever, chills, CP, SOB, abd pain, dysuria, or diarrhea.     1/24: Pt was seen and examined. Pt has no acute complaints. Speech still seemed slurred to me. Will f/u daughter if that's pt baseline. Pt reports he walks normally with cane at home. Denies any previous seizures. Pt denies aura, photophobia, abnormal taste/smell, LOC. Denies fever, chills, CP, SOB, headache, N/V/D/C.    REVIEW OF SYSTEMS:  CONSTITUTIONAL: No weakness, fevers or chills  EYES/ENT: No visual changes;  No vertigo or throat pain. Slurred speech.  NECK: No pain or stiffness  RESPIRATORY: No cough, wheezing, hemoptysis; No shortness of breath  CARDIOVASCULAR: No chest pain or palpitations  GASTROINTESTINAL: No abdominal or epigastric pain. No nausea, vomiting, or hematemesis; No diarrhea or constipation. No melena or hematochezia.  GENITOURINARY: No dysuria, frequency or hematuria  NEUROLOGICAL: No numbness or weakness  SKIN: No itching, burning, rashes, or lesions   All other review of systems is negative unless indicated above    Vital Signs Last 24 Hrs  T(C): 37 (24 Jan 2019 11:17), Max: 37 (24 Jan 2019 11:17)  T(F): 98.6 (24 Jan 2019 11:17), Max: 98.6 (24 Jan 2019 11:17)  HR: 72 (24 Jan 2019 11:17) (65 - 78)  BP: 116/44 (24 Jan 2019 11:17) (116/44 - 140/66)  RR: 18 (24 Jan 2019 11:17) (16 - 18)  SpO2: 98% (24 Jan 2019 11:17) (95% - 100%)        PHYSICAL EXAM:  Constitutional: NAD, awake and alert, well-developed  HEENT: PERR, EOMI, Normal Hearing, MMM, Asymmetric facial expressions, slurred speech.  Neck: Soft and supple, No LAD, No JVD  Respiratory: Breath sounds are clear bilaterally, No wheezing, rales or rhonchi  Cardiovascular: S1 and S2, regular rate and rhythm, no Murmurs, gallops or rubs  Gastrointestinal: Bowel Sounds present, soft, nontender, nondistended, no guarding, no rebound  Extremities: No peripheral edema  Vascular: 2+ peripheral pulses  Neurological: A/O x 3.  Musculoskeletal: Normal Strength  Skin: No rashes    MEDICATIONS:  MEDICATIONS  (STANDING):  ALPRAZolam 0.25 milliGRAM(s) Oral two times a day  aspirin enteric coated 81 milliGRAM(s) Oral daily  atorvastatin 80 milliGRAM(s) Oral at bedtime  azelastine 0.1% Nasal Spray 1 Spray(s) Both Nostrils two times a day  bethanechol 25 milliGRAM(s) Oral two times a day  cefTRIAXone Injectable. 1000 milliGRAM(s) IV Push every 24 hours  cefTRIAXone Injectable.      cholecalciferol 1000 Unit(s) Oral daily  finasteride 5 milliGRAM(s) Oral daily  levothyroxine 75 MICROGram(s) Oral daily  loteprednol 0.2% Ophthalmic Suspension 1 Drop(s) Both EYES daily  pantoprazole    Tablet 40 milliGRAM(s) Oral before breakfast  ranolazine 1000 milliGRAM(s) Oral two times a day  sertraline 100 milliGRAM(s) Oral daily      LABS: All Labs Reviewed:                        8.1    9.57  )-----------( 135      ( 24 Jan 2019 04:16 )             24.3     01-24    141  |  109<H>  |  53<H>  ----------------------------<  104<H>  3.9   |  25  |  2.21<H>    Ca    8.6      24 Jan 2019 04:16    TPro  7.4  /  Alb  3.5  /  TBili  0.8  /  DBili  x   /  AST  58<H>  /  ALT  36  /  AlkPhos  62  01-23      CARDIAC MARKERS ( 24 Jan 2019 10:51 )  0.138 ng/mL / x     / x     / x     / x      CARDIAC MARKERS ( 24 Jan 2019 04:16 )  0.160 ng/mL / x     / x     / x     / x      CARDIAC MARKERS ( 23 Jan 2019 14:03 )  0.093 ng/mL / x     / x     / x     / x          RADIOLOGY/EKG:    < from: CT Head No Cont (01.23.19 @ 14:38) >  EXAM:  CT BRAIN                            PROCEDURE DATE:  01/23/2019          INTERPRETATION:  CLINICAL INDICATION:  Altered mental status.    TECHNIQUE : Axial CT scanning of the brain was obtained from the skull   base to the vertex without theadministration of intravenous contrast.   Coronal and sagittal reformatted images were subsequently obtained.     COMPARISON: Head CT dated 7/8/2006.    FINDINGS:      There is no loss of the gray-white matter distinction to indicate acute   territorial infarct. No acute intracranial hemorrhage.    There is no hydrocephalus, mass effect or midline shift.    There is prominence of the bilateral sulci and bilateral frontoparietal   extra-axial spaces with vessels coursing through the extra-axial spaces,   consistent with mild cerebral volume loss, advanced since the CT of   7/8/06.    There is nonspecific bilateral cerebral white matter hypodensities,   likely reflective of mild chronic microvascular changes.    Evidence for bilateral cataract surgery, otherwise the visualized orbits   are unremarkable.     The calvarium is intact.    The visualized paranasal sinuses and mastoid air cells are clear.    IMPRESSION:    No acute intracranial hemorrhage, mass effect or midline shift.    Mild cerebral atrophy, advanced since the CT of 7/8/06.    Chronic mild bilateral cerebral white matter microvascular changes.    < end of copied text >      < from: Xray Chest 1 View- PORTABLE-Urgent (01.23.19 @ 15:12) >  EXAM:  XR CHEST PORTABLE URGENT 1V                            PROCEDURE DATE:  01/23/2019          INTERPRETATION:  CLINICAL INDICATION:  AMS    TECHNIQUE: Single view AP chest radiograph was performed.    COMPARISON:  Chest radiograph dated 10/17/2015.    FINDINGS:    Hyperinflated lungs, flattening of the diaphragms, with bilateral upper   lobe lucencies consistent with COPD.    There is redemonstration of small multifocal opacities projecting over   the left lung field and right mid and lower lung fields suggestive of   calcified pleural plaque. There is calcified pleural plaque along the   bilateral lung apices, lateral right lower pleura and lateral left   hemidiaphragm. Findings are likely a sequela of asbestosis exposure.   There is no evidence for pneumothorax, consolidation, vascular congestion   or pleural effusion.    The cardiac silhouette size is within normal limits.    Chronic resorption of the distal right clavicle is again noted.    IMPRESSION:    No infiltrate to suggest pneumonia.    Bilateral scattered calcified pleural plaque, suggestive of asbestosis   exposure.    COPD.    < end of copied text >    < from: Xray Pelvis AP only (01.23.19 @ 20:21) >    EXAM:  XR PELVIS AP ONLY 1-2 VIEWS                            PROCEDURE DATE:  01/23/2019          INTERPRETATION:      Radiograph of the pelvis         CLINICAL INFORMATION:  RIGHT hip  Pain.    TECHNIQUE:  A single frontal view of the pelvis was obtained.    FINDINGS:   No prior similar studies are available for review.    Pelvic bones appear intact.  No fracture is recognized.  The hips are   located.  The sacroiliac joints and pubic symphysis remain intact.  No   pathologic calcifications are seen.  Soft tissues appear intact. Vascular   calcification is seen.    IMPRESSION:   No fracture or dislocation.         < end of copied text >    < from: EEG Awake and Asleep (01.24.19 @ 08:00) >   EXAM:  EEG AWAKE AND ASLEEP        PROCEDURE DATE:  01/24/2019        INTERPRETATION:  EEG # 19 - 44          DOS : 1/24/19       AGE : 89        S : M         LOCATION : 3N    Referring Physician :Dr. Solitario                   Reviewing Physician : Dr. JAMILAH Douglas    This 17-channel EEG recording for this 89-year-old patient with the   history of acute kidney injury and shaking with speech difficulties.   Patient asleep during the test difficulty waking up, able to follow   commands relaxed and asleep. Current medications include alprazolam,   atorvastatin, bethanechol, ceftriaxone, finasteride, levothyroxin,   sertraline, ranolazine.    The background activity consists of bilaterally symmetrical 7-8 Hz 15-30   uV activity occipitally predominant activity which attenuates with eye   opening.     Predominantly patient is sleeping most of the recording and background   activity attenuated and slow activity noted in the range of 4-5 Hz in the   frontotemporal head regions with occasional vertex waves and sleep   spindles.    No focal slowing or epileptiform activity noted.    Hyperventilation was not performed. Stepped photic stimulation did not   demonstrate any abnormality.    Intermittent muscle, electrical artifacts which did obscured the   recording.    Impression : This is mildly abnormal EEG due to background slow activity   suggestive of underlying diffuse cerebral dysfunction probably   appropriate for his age. Patient also asleep most of the recording. If   clinically warranted suggest repeat EEG when patient is more awake.    < end of copied text >

## 2019-01-24 NOTE — CONSULT NOTE ADULT - SUBJECTIVE AND OBJECTIVE BOX
Patient is a 89y old  Male who presents with a chief complaint of slurred speech, difficulty talking (2019 08:37)      HPI:  89 y.o. male with PMH HTN, HLD, nonobstructive CAD, carotid stenosis 99%, skin ca s/p surgery, anxiety, BPH, prostate ca with injection (25 yr ago), hypothyroidism presents with slurred speech and difficulty talking. Pt's daughter Tarsha at bedside with son in law and grand daughter. Pt usually speaks with daughter on daily basis for at least 30 minutes. Today, while talking, pt appears to comprehend, but unable to speak. Pt also with slurred speech. EMS was contacted and per ED note, pt had ?seizure while on the ambulance. Pt denies fever, chills, CP, SOB, abd pain, dysuria, or diarrhea. Pt reports right sided hip pain that is not new. Currently, pt's daughter states the symptoms is almost back to normal. Per daughter, pt initially unable to recognize her and only later would he recognize her. Pt baseline walks walker/cane. Does all ADLs including driving. Pt does have frequent falls recently.     PMH: as above  PSH: skin surgery, eye lid surgery, hernia repair >30 yr ago  Social hx: denies tobacco, EtOH, drugs  Family hx: Father-throat ca, stroke,  age ~65; Mother-stomach ca,  age ~65  ROS: per ngoc PAN historian (2019 19:06)      PAST MEDICAL & SURGICAL HISTORY:  Skin cancer  Carotid stenosis  HTN (hypertension)      PREVIOUS CARDIAC WORKUP:      Echo:  Stress Test:  Cardiac Cath:    ALLERGIES:    No Known Allergies       MEDICATIONS  (STANDING):  ALPRAZolam 0.25 milliGRAM(s) Oral two times a day  aspirin enteric coated 81 milliGRAM(s) Oral daily  atorvastatin 80 milliGRAM(s) Oral at bedtime  azelastine 0.1% Nasal Spray 1 Spray(s) Both Nostrils two times a day  bethanechol 25 milliGRAM(s) Oral two times a day  cefTRIAXone Injectable. 1000 milliGRAM(s) IV Push every 24 hours  cefTRIAXone Injectable.      cholecalciferol 1000 Unit(s) Oral daily  finasteride 5 milliGRAM(s) Oral daily  levothyroxine 75 MICROGram(s) Oral daily  loteprednol 0.2% Ophthalmic Suspension 1 Drop(s) Both EYES daily  pantoprazole    Tablet 40 milliGRAM(s) Oral before breakfast  ranolazine 1000 milliGRAM(s) Oral two times a day  sertraline 100 milliGRAM(s) Oral daily    MEDICATIONS  (PRN):  ALPRAZolam 0.25 milliGRAM(s) Oral daily PRN for anxiety      FAMILY HISTORY:        SOCIAL HISTORY:  .scl     ROS:     .ros    Vital Signs Last 24 Hrs  T(C): 36.5 (2019 04:50), Max: 36.8 (2019 14:09)  T(F): 97.7 (2019 04:50), Max: 98.2 (2019 14:09)  HR: 78 (2019 04:50) (65 - 90)  BP: 140/66 (2019 04:50) (131/52 - 140/66)  BP(mean): --  RR: 17 (2019 04:50) (16 - 18)  SpO2: 96% (2019 04:50) (95% - 100%)    I&O's Summary      PHYSICAL EXAM:    .phy      TELEMETRY:    ECG:    LABS:                          8.1    9.57  )-----------( 135      ( 2019 04:16 )             24.3         141  |  109<H>  |  53<H>  ----------------------------<  104<H>  3.9   |  25  |  2.21<H>    Ca    8.6      2019 04:16    TPro  7.4  /  Alb  3.5  /  TBili  0.8  /  DBili  x   /  AST  58<H>  /  ALT  36  /  AlkPhos  62   @ 04:16  Trop-I  0.160  CK      --  CK-MB   --     @ 14:03  Trop-I  0.093  CK      --  CK-MB   --        Urinalysis Basic - ( 2019 16:10 )    Color: Yellow / Appearance: Clear / S.020 / pH: x  Gluc: x / Ketone: Trace  / Bili: Small / Urobili: 1 mg/dL   Blood: x / Protein: 30 mg/dL / Nitrite: Positive   Leuk Esterase: Trace / RBC: 0-2 /HPF / WBC 3-5   Sq Epi: x / Non Sq Epi: Occasional / Bacteria: Moderate            RADIOLOGY & ADDITIONAL STUDIES: Chief complaint of slurred speech, difficulty talking (24 Jan 2019 08:37)      HPI:  89-year-old male admitted with complaints of slurred speech that has resolved spontaneously. Diagnosed with a TIA. Patient has had a history of severe aortic stenosis and severe right internal carotid artery stenosis. He had refused invasive treatment for both in the past.      PAST MEDICAL & SURGICAL HISTORY:  Skin cancer  SATISH Carotid stenosis  Severe AS  HTN (hypertension)  HLD  CKD      PREVIOUS CARDIAC WORKUP:      Echo:  10/18 Nml EF, severe AS, mod MR, mod TR, moderate PAH  Stress Test:  Cardiac Cath:    ALLERGIES:    No Known Allergies       MEDICATIONS  (STANDING):  ALPRAZolam 0.25 milliGRAM(s) Oral two times a day  aspirin enteric coated 81 milliGRAM(s) Oral daily  atorvastatin 80 milliGRAM(s) Oral at bedtime  azelastine 0.1% Nasal Spray 1 Spray(s) Both Nostrils two times a day  bethanechol 25 milliGRAM(s) Oral two times a day  cefTRIAXone Injectable. 1000 milliGRAM(s) IV Push every 24 hours  cefTRIAXone Injectable.      cholecalciferol 1000 Unit(s) Oral daily  finasteride 5 milliGRAM(s) Oral daily  levothyroxine 75 MICROGram(s) Oral daily  loteprednol 0.2% Ophthalmic Suspension 1 Drop(s) Both EYES daily  pantoprazole    Tablet 40 milliGRAM(s) Oral before breakfast  ranolazine 1000 milliGRAM(s) Oral two times a day  sertraline 100 milliGRAM(s) Oral daily    MEDICATIONS  (PRN):  ALPRAZolam 0.25 milliGRAM(s) Oral daily PRN for anxiety      FAMILY HISTORY:  NC        SOCIAL HISTORY:  Nonsmoker. No ETOH abuse. No illicit drugs.     ROS:     Detailed ten system ROS was performed and was negative except for history as eluded to above.    no fever  no chills  no nausea  no vomiting  no diarrhea  no constipation  no melena  no hematochezia  no chest pain  no palpitations  no sob at rest  no dyspnea on exertion  no cough  no wheezing  no anorexia  no headache  no dizziness  no syncope  no weakness  no myalgia  no dysuria  no polyuria  no hematuria       Vital Signs Last 24 Hrs  T(C): 36.5 (24 Jan 2019 04:50), Max: 36.8 (23 Jan 2019 14:09)  T(F): 97.7 (24 Jan 2019 04:50), Max: 98.2 (23 Jan 2019 14:09)  HR: 78 (24 Jan 2019 04:50) (65 - 90)  BP: 140/66 (24 Jan 2019 04:50) (131/52 - 140/66)  RR: 17 (24 Jan 2019 04:50) (16 - 18)  SpO2: 96% (24 Jan 2019 04:50) (95% - 100%)      PHYSICAL EXAM:    General:                Comfortable, AAO X 3, in no distress.  HEENT:                  Atraumatic, PERRLA, EOMI, conjunctiva clear.    Neck:                     Supple, no adenopathy, no thyromegaly, no JVD, + bruit.  Back:                     Symmetric, non tender.  Chest:                    Clear, B/L symmetric air entry, no tachypnea  Heart:                     S1, S2 normal, +S4, + murmur.  Abdomen:              Soft, non-tender, bowel sounds active. No palpable masses.  Extremities:           no cyanosis, no edema. Peripheral pulses normal.  Skin:                      Skin color, texture normal. No rashes.  Neurologic:            Grossly nonfocal.       TELEMETRY:      ECG:  NSR, PVCs, septal infarct pattern    LABS:                          8.1    9.57  )-----------( 135      ( 24 Jan 2019 04:16 )             24.3     01-24    141  |  109<H>  |  53<H>  ----------------------------<  104<H>  3.9   |  25  |  2.21<H>    Ca    8.6      24 Jan 2019 04:16    TPro  7.4  /  Alb  3.5  /  TBili  0.8  /  DBili  x   /  AST  58<H>  /  ALT  36  /  AlkPhos  62  01-23 01-24 @ 04:16  Trop-I  0.160    01-23 @ 14:03  Trop-I  0.093      RADIOLOGY & ADDITIONAL STUDIES:    Xray Chest 1 View- PORTABLE-Urgent (01.23.19 @ 15:12) >  IMPRESSION:  No infiltrate to suggest pneumonia. Bilateral scattered calcified pleural plaque, suggestive of asbestosis exposure. COPD.    CT Head No Cont (01.23.19 @ 14:38) > IMPRESSION:  No acute intracranial hemorrhage, mass effect or midline shift.  Mild cerebral atrophy, advanced since the CT of 7/8/06.  Chronic mild bilateral cerebral white matter microvascular changes.

## 2019-01-24 NOTE — CONSULT NOTE ADULT - ASSESSMENT
Advanced age pt with known h/o severe AS and or SATISH stenosis. Pt had wanted conservative care  Will suggest medical management.  D/W daughter TIA  SATISH stenosis  Severe AS  Ac on chronic kidney disease.   HTN  HLD        Advanced age pt with known h/o severe AS and severe SATISH stenosis. Pt had wanted conservative care, refuse invasive treatment in the past.   Will suggest medical management.  Hydrate  Follow renal function  Minimally elevated Trop-I. No chest pain. Will follow up.   Echo  Neuro work up  D/W daughter

## 2019-01-24 NOTE — PHYSICAL THERAPY INITIAL EVALUATION ADULT - ASSISTIVE DEVICE FOR TRANSFER: SIT/STAND, REHAB EVAL
1. Please take Protonix 40 mg daily on the empty stomach every morning, 30 minutes prior breakfast.  2. Follow a strict GERD diet  3. Follow-up in one year    Lifestyle and Dietary changes for gastroesophageal reflux disease (GERD):    1. Avoid large meals and eating too fast.   Recommendation: Frequent, small meals throughout the day. For example  eat 6 small meals rather than 3 large meals.     2. Avoid eating and going to bed or lying down.   Recommendation: Wait at least 3 hours after eating or drinking before going to bed/lying down    3. Avoid certain foods known to worsen reflux. Examples include:    · Foods that are high in fat such as fast food, fried food, and pizza. Fatty foods empty more slowly from the stomach, which can increase GERD symptoms.  · Spicy food  · Citrus fruits or juices (lemonade, orange juice, oranges, grapefruits, pineapples, etc.)  · Tomatoes or tomato based products (tomato paste, tomato sauce, salsa)  · Peppermint  · Chocolate    4. Avoid beverages that contain:   · Caffeine, although ONE cup of coffee or tea per day is acceptable  · Carbonation, including ALL soda and carbonated flavored water  · Alcohol because it weakens the lower esophageal sphincter (the point between the esophagus and stomach) allowing more acid to touch the esophagus, thus increasing reflux symptoms.     5. Avoid all tobacco products including cigarettes and smokeless tobacco.  Nicotine weakens the lower esophageal sphincter (the point between the esophagus and stomach) causing increased reflux symptoms.     6. Maintain a healthy weight.  If you are overweight, even losing just 10% of your body weight can decrease GERD symptoms by decreasing the pressure within your abdomen.     7. Exercise 2-3 hours after eating.  Strenuous physical activity immediately after eating can cause stomach acid to reflux into the esophagus. Light activity, such as walking, is generally tolerated.     8. Avoid tight fitting clothes  or belts as that can increase the pressure in your abdomen and cause an increase in GERD symptoms.     9. Avoid bending or straining, especially after meals.         rolling walker

## 2019-01-24 NOTE — CONSULT NOTE ADULT - ATTENDING COMMENTS
89 year old male with high grade SATISH stenosis, unlikely symptomatic.  Known severe AS.  Patient is unlikely to benefit from any carotid revascularization due to limited 5 year survival.  No plans for CEA or PADDY.  Continue high intensity statin and ASA therapy

## 2019-01-24 NOTE — PHYSICAL THERAPY INITIAL EVALUATION ADULT - DIAGNOSIS, PT EVAL
slurred speech, difficulty speaking concerning for TIA, possible seizure;confusional state possible toxic encephalopathy vs TIA

## 2019-01-24 NOTE — SWALLOW BEDSIDE ASSESSMENT ADULT - SLP GENERAL OBSERVATIONS
The pt was alert and interactive but Hughes. He was able to verbalize during communicative probes/in conversation while demonstrating functional motor speech abilities without evidence of an overt primary linguistic pathology. However, his speech integrity was somewhat hampered by his many missing teeth and his utterances reflected variably reduced STM which is chronic(memory deficits from dementia). In any case, he was able to verbalize his needs and family feels he is at communicative baseline.

## 2019-01-24 NOTE — CONSULT NOTE ADULT - SUBJECTIVE AND OBJECTIVE BOX
CC: slurred speech, difficulty talking     HPI:  89 y/o M with PMH listed below presented with slurred speech and difficulty talking on admission. On admission family reports patient was unable to speak and unable to recognize family members which prompted the admission. Patient reports in the past almost getting a CEA but for some reason surgery was cancelled. Patient reports he just takes medication for the SATISH stenosis. Patient reports he ambulates at home with a walker/cane at home. Patient denies recent trauma, falls, dizziness, nausea, vomiting, changes in vision.     On exam patient has some slurred speech but able to make full sentences Patient has no motor deficits. Patient does not recall events that brought him to the hospital.       PMH: HTN, HLD, nonobstructive CAD, carotid stenosis, skin cancer, anxiety, BPH, prostate, hypothyroidism   PSH: skin surgery, eye lid surgery, hernia repair >30 yr ago  Social hx: denies tobacco, EtOH, drugs  Family hx: Father-throat ca, stroke,  age ~65; Mother-stomach ca,  age ~65    Vitals;   ICU Vital Signs Last 24 Hrs  T(C): 37 (2019 11:17), Max: 37 (2019 11:17)  T(F): 98.6 (2019 11:17), Max: 98.6 (2019 11:17)  HR: 72 (2019 11:17) (65 - 78)  BP: 116/44 (2019 11:17) (116/44 - 140/66)  BP(mean): --  ABP: --  ABP(mean): --  RR: 18 (2019 11:17) (16 - 18)  SpO2: 98% (2019 11:17) (95% - 100%)    Physical exam:   Constitutional: NAD, awake and alert, well-developed  HEENT: PERR, EOMI, mild slurred speech.  Neck: Soft and supple, No LAD, No JVD  Respiratory: CTA b/l , No wheezing, rales or rhonchi  Cardiovascular: S1 and S2, RRR, no Murmurs, gallops or rubs  Gastrointestinal: Bowel Sounds present, soft, NT, ND, No RGR   Extremities: No peripheral edema  Vascular: 2+ peripheral pulses  Neurological: A/O x 3.  Musculoskeletal: Normal Strength  Skin: No rashes    Labs;                         8.1    9.57  )-----------( 135      ( 2019 04:16 )             24.3       141  |  109<H>  |  53<H>  ----------------------------<  104<H>  3.9   |  25  |  2.21<H>    Ca    8.6      2019 04:16    TPro  7.4  /  Alb  3.5  /  TBili  0.8  /  DBili  x   /  AST  58<H>  /  ALT  36  /  AlkPhos  62        imaging:   EXAM:  MR ANGIO BRAIN                          EXAM:  MR ANGIO NECK                          EXAM:  MR BRAIN                            PROCEDURE DATE:  2019          INTERPRETATION:  Exam Type: MRI BRAIN, MRA COW, MRA NECK WITHOUT CONTRAST   Indication: slurred speech  Comparison:CT head 2019    Technique: Multiplanar MR imaging of the brain was obtained without   contrast. Time of flight MRA of the neck and [place] were   obtained.    Findings:    Brain: There is diffuse FLAIR hyperintense signal throughout the sulci of   the bilateral cerebri and a tiny subdural collection overlying the right   occipital lobe, nonspecific, may reflect hemorrhage or cellular material   from infection or inflammation. There are bilateral whole hemispheric   subdural fluid collections measuring up to 0.9 cm overlying the bilateral   frontal lobes. No acute infarct. There is no mass effect or midline   shift.  Cerebellar tonsils are in normal location. The intracranial flow   voids are normal in appearance. Visualized paranasal sinuses and mastoids   are normal in signal.    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 There is a normal congenital variant   anastomosis from the cavernous segment of the left internal carotid   artery to the basilar artery that remains widely patent. The posterior   cerebral arteries are grossly patent. The anterior circulation is grossly   patent. Diffuse atherosclerotic changes of theintracranial circulation   are present.      MRA of the neck:  High-grade focal stenosis of the proximal right   internal carotid artery with distal reconstitution of flow. Right common   carotid artery and right external carotid artery are unremarkable. Left   common carotid artery, left internal carotid artery and left external   carotid artery are grossly intact.        IMPRESSION:    MRI brain: Diffuse FLAIR hyperintense signal throughout the sulci of the   bilateral cerebri and a tiny subdural collection overlying the right   occipital lobe, nonspecific, may reflect hemorrhage or cellular material   from infection or inflammation. There are bilateral whole hemispheric   subdural fluid collections measuring up to 0.9 cm overlying the bilateral   frontal lobes. No significant mass effect or midline shift.    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 There is a normal congenital variant anastomosis from the   cavernous segment of the left internal carotid artery to the basilar   artery that remains widely patent.     MRA neck: High-grade focal stenosis of the proximal right internal   carotid artery with distal reconstitution of flow.

## 2019-01-24 NOTE — PHYSICAL THERAPY INITIAL EVALUATION ADULT - GENERAL OBSERVATIONS, REHAB EVAL
Pre and post session: supine in bed with bed alarm on. +telemetry and IV in place. call bell and phone in reach. No c/o pain. speech is clear and intelligible. Tolerated well and would benefit from further skilled PT for functional  mobility training.

## 2019-01-24 NOTE — SWALLOW BEDSIDE ASSESSMENT ADULT - PHARYNGEAL PHASE
Swallow triggered in acceptable time frame for age. Laryngeal lift on palpation during swallowing trials was very mildly reduced but felt to be functional and within age acceptable parameters. No behavioral aspiration signs exhibited on exam.

## 2019-01-24 NOTE — CONSULT NOTE ADULT - ASSESSMENT
89 y.o. male with PMH HTN, HLD, nonobstructive CAD, carotid stenosis 99%, skin ca s/p surgery, anxiety, BPH, prostate ca with injection (25 yr ago), hypothyroidism presents with slurred speech and difficulty talking. Pt also with slurred speech. EMS was contacted and per ED note, pt had ?seizure while on the ambulance.    -slurred speech, difficulty speaking R/o  TIA, possible seizure in Ambulance  -confusional state possible toxic encephalopathy vs TIA  -Agree with MRI and MRA  -EEG  -Correct underlying metabolic causes.  -Pt / speech evaluation.  - Consult Dr. Lorenzo.  - Continue Aspirin and  statin.  - Will f/u.

## 2019-01-25 ENCOUNTER — TRANSCRIPTION ENCOUNTER (OUTPATIENT)
Age: 84
End: 2019-01-25

## 2019-01-25 LAB
ANION GAP SERPL CALC-SCNC: 7 MMOL/L — SIGNIFICANT CHANGE UP (ref 5–17)
BASOPHILS # BLD AUTO: 0.01 K/UL — SIGNIFICANT CHANGE UP (ref 0–0.2)
BASOPHILS NFR BLD AUTO: 0.2 % — SIGNIFICANT CHANGE UP (ref 0–2)
BUN SERPL-MCNC: 34 MG/DL — HIGH (ref 7–23)
CALCIUM SERPL-MCNC: 8.3 MG/DL — LOW (ref 8.5–10.1)
CHLORIDE SERPL-SCNC: 110 MMOL/L — HIGH (ref 96–108)
CO2 SERPL-SCNC: 24 MMOL/L — SIGNIFICANT CHANGE UP (ref 22–31)
CREAT SERPL-MCNC: 1.56 MG/DL — HIGH (ref 0.5–1.3)
EOSINOPHIL # BLD AUTO: 0.07 K/UL — SIGNIFICANT CHANGE UP (ref 0–0.5)
EOSINOPHIL NFR BLD AUTO: 1.2 % — SIGNIFICANT CHANGE UP (ref 0–6)
GLUCOSE SERPL-MCNC: 82 MG/DL — SIGNIFICANT CHANGE UP (ref 70–99)
HCT VFR BLD CALC: 29.8 % — LOW (ref 39–50)
HGB BLD-MCNC: 9.5 G/DL — LOW (ref 13–17)
IMM GRANULOCYTES NFR BLD AUTO: 0.3 % — SIGNIFICANT CHANGE UP (ref 0–1.5)
LYMPHOCYTES # BLD AUTO: 0.46 K/UL — LOW (ref 1–3.3)
LYMPHOCYTES # BLD AUTO: 7.6 % — LOW (ref 13–44)
MCHC RBC-ENTMCNC: 31.9 GM/DL — LOW (ref 32–36)
MCHC RBC-ENTMCNC: 32.5 PG — SIGNIFICANT CHANGE UP (ref 27–34)
MCV RBC AUTO: 102.1 FL — HIGH (ref 80–100)
MONOCYTES # BLD AUTO: 0.41 K/UL — SIGNIFICANT CHANGE UP (ref 0–0.9)
MONOCYTES NFR BLD AUTO: 6.8 % — SIGNIFICANT CHANGE UP (ref 2–14)
NEUTROPHILS # BLD AUTO: 5.06 K/UL — SIGNIFICANT CHANGE UP (ref 1.8–7.4)
NEUTROPHILS NFR BLD AUTO: 83.9 % — HIGH (ref 43–77)
NRBC # BLD: 0 /100 WBCS — SIGNIFICANT CHANGE UP (ref 0–0)
PLATELET # BLD AUTO: 140 K/UL — LOW (ref 150–400)
POTASSIUM SERPL-MCNC: 4.1 MMOL/L — SIGNIFICANT CHANGE UP (ref 3.5–5.3)
POTASSIUM SERPL-SCNC: 4.1 MMOL/L — SIGNIFICANT CHANGE UP (ref 3.5–5.3)
RBC # BLD: 2.92 M/UL — LOW (ref 4.2–5.8)
RBC # FLD: 14.3 % — SIGNIFICANT CHANGE UP (ref 10.3–14.5)
SODIUM SERPL-SCNC: 141 MMOL/L — SIGNIFICANT CHANGE UP (ref 135–145)
WBC # BLD: 6.03 K/UL — SIGNIFICANT CHANGE UP (ref 3.8–10.5)
WBC # FLD AUTO: 6.03 K/UL — SIGNIFICANT CHANGE UP (ref 3.8–10.5)

## 2019-01-25 PROCEDURE — 99233 SBSQ HOSP IP/OBS HIGH 50: CPT

## 2019-01-25 PROCEDURE — 99222 1ST HOSP IP/OBS MODERATE 55: CPT

## 2019-01-25 RX ORDER — HEPARIN SODIUM 5000 [USP'U]/ML
5000 INJECTION INTRAVENOUS; SUBCUTANEOUS EVERY 12 HOURS
Qty: 0 | Refills: 0 | Status: DISCONTINUED | OUTPATIENT
Start: 2019-01-25 | End: 2019-01-28

## 2019-01-25 RX ORDER — ASPIRIN/CALCIUM CARB/MAGNESIUM 324 MG
81 TABLET ORAL DAILY
Qty: 0 | Refills: 0 | Status: DISCONTINUED | OUTPATIENT
Start: 2019-01-25 | End: 2019-01-28

## 2019-01-25 RX ADMIN — Medication 0.25 MILLIGRAM(S): at 05:28

## 2019-01-25 RX ADMIN — FINASTERIDE 5 MILLIGRAM(S): 5 TABLET, FILM COATED ORAL at 13:11

## 2019-01-25 RX ADMIN — Medication 0.25 MILLIGRAM(S): at 17:41

## 2019-01-25 RX ADMIN — Medication 25 MILLIGRAM(S): at 05:27

## 2019-01-25 RX ADMIN — Medication 25 MILLIGRAM(S): at 17:39

## 2019-01-25 RX ADMIN — ATORVASTATIN CALCIUM 80 MILLIGRAM(S): 80 TABLET, FILM COATED ORAL at 22:37

## 2019-01-25 RX ADMIN — LOTEPREDNOL ETABONATE 1 DROP(S): 2 SUSPENSION/ DROPS OPHTHALMIC at 12:00

## 2019-01-25 RX ADMIN — Medication 1000 UNIT(S): at 13:12

## 2019-01-25 RX ADMIN — RANOLAZINE 1000 MILLIGRAM(S): 500 TABLET, FILM COATED, EXTENDED RELEASE ORAL at 17:39

## 2019-01-25 RX ADMIN — Medication 75 MICROGRAM(S): at 05:27

## 2019-01-25 RX ADMIN — PANTOPRAZOLE SODIUM 40 MILLIGRAM(S): 20 TABLET, DELAYED RELEASE ORAL at 05:29

## 2019-01-25 RX ADMIN — HEPARIN SODIUM 5000 UNIT(S): 5000 INJECTION INTRAVENOUS; SUBCUTANEOUS at 22:37

## 2019-01-25 RX ADMIN — CEFTRIAXONE 1000 MILLIGRAM(S): 500 INJECTION, POWDER, FOR SOLUTION INTRAMUSCULAR; INTRAVENOUS at 17:47

## 2019-01-25 RX ADMIN — Medication 81 MILLIGRAM(S): at 17:41

## 2019-01-25 RX ADMIN — AZELASTINE 2 SPRAY(S): 137 SPRAY, METERED NASAL at 05:28

## 2019-01-25 RX ADMIN — SERTRALINE 100 MILLIGRAM(S): 25 TABLET, FILM COATED ORAL at 13:11

## 2019-01-25 RX ADMIN — RANOLAZINE 1000 MILLIGRAM(S): 500 TABLET, FILM COATED, EXTENDED RELEASE ORAL at 05:27

## 2019-01-25 NOTE — DISCHARGE NOTE ADULT - MEDICATION SUMMARY - MEDICATIONS TO TAKE
I will START or STAY ON the medications listed below when I get home from the hospital:    finasteride 5 mg oral tablet  -- 1 tab(s) by mouth once a day  -- Indication: For BPH (benign prostatic hyperplasia)    aspirin 81 mg oral tablet, chewable  -- 1 tab(s) by mouth once a day  -- Indication: For Analgesic    losartan 25 mg oral tablet  -- 1 tab(s) by mouth once a day  -- Indication: For Hypertension    Ranexa 1000 mg oral tablet, extended release  -- 1 tab(s) by mouth 2 times a day  -- Indication: For Anti-anginal     sertraline 100 mg oral tablet  -- 1 tab(s) by mouth once a day  -- Indication: For depression    atorvastatin 80 mg oral tablet  -- 1 tab(s) by mouth once a day (at bedtime)  -- Indication: For Coronary artery disease    clopidogrel 75 mg oral tablet  -- 1 tab(s) by mouth once a day  -- Indication: For Coronary artery disease    ALPRAZolam 0.25 mg oral tablet  -- 1 tab(s) by mouth 2 times a day  -- Indication: For Anxiety    ALPRAZolam 0.25 mg oral tablet  -- 1 tab(s) by mouth once a day, As needed, for anxiety  -- Indication: For Anxiety    metoprolol tartrate 25 mg oral tablet  -- 1 tab(s) by mouth 2 times a day  -- Indication: For Hypertension/Coronary artery disease    bethanechol 25 mg oral tablet  -- 1 tab(s) by mouth 2 times a day  -- Indication: For urinary retention     azelastine 137 mcg/inh (0.1%) nasal spray  -- 2 spray(s) into nose 2 times a day  -- Indication: For nasal spray    Alrex 0.2% ophthalmic suspension  -- 1 drop(s) in each eye once a day  -- Indication: For eye drops    pantoprazole 40 mg oral delayed release tablet  -- 1 tab(s) by mouth once a day  -- Indication: For GERD/PPI    levothyroxine 75 mcg (0.075 mg) oral tablet  -- 1 tab(s) by mouth once a day  -- Indication: For Hypothyroidism    cholecalciferol 1000 intl units oral tablet  -- 1 tab(s) by mouth once a day  -- Indication: For vitamin supplementation    cyanocobalamin 1000 mcg sublingual tablet  -- 1 tab(s) by mouth once a day  -- Indication: For vitamin supplementation I will START or STAY ON the medications listed below when I get home from the hospital:    finasteride 5 mg oral tablet  -- 1 tab(s) by mouth once a day  -- Indication: For BPH (benign prostatic hyperplasia)    aspirin 81 mg oral tablet, chewable  -- 1 tab(s) by mouth once a day  -- Indication: For Analgesic    Ranexa 1000 mg oral tablet, extended release  -- 1 tab(s) by mouth 2 times a day  -- Indication: For Anti-anginal     sertraline 100 mg oral tablet  -- 1 tab(s) by mouth once a day  -- Indication: For depression    atorvastatin 80 mg oral tablet  -- 1 tab(s) by mouth once a day (at bedtime)  -- Indication: For Coronary artery disease    clopidogrel 75 mg oral tablet  -- 1 tab(s) by mouth once a day  -- Indication: For Coronary artery disease    ALPRAZolam 0.25 mg oral tablet  -- 1 tab(s) by mouth 2 times a day  -- Indication: For Anxiety    ALPRAZolam 0.25 mg oral tablet  -- 1 tab(s) by mouth once a day, As needed, for anxiety  -- Indication: For Anxiety    metoprolol tartrate 25 mg oral tablet  -- 1 tab(s) by mouth 2 times a day  -- Indication: For Hypertension/Coronary artery disease    Norvasc 2.5 mg oral tablet  -- 1 tab(s) by mouth once a day   -- It is very important that you take or use this exactly as directed.  Do not skip doses or discontinue unless directed by your doctor.  Some non-prescription drugs may aggravate your condition.  Read all labels carefully.  If a warning appears, check with your doctor before taking.    -- Indication: For Hypertension    bethanechol 25 mg oral tablet  -- 1 tab(s) by mouth 2 times a day  -- Indication: For urinary retention     azelastine 137 mcg/inh (0.1%) nasal spray  -- 2 spray(s) into nose 2 times a day  -- Indication: For nasal spray    Alrex 0.2% ophthalmic suspension  -- 1 drop(s) in each eye once a day  -- Indication: For eye drops    pantoprazole 40 mg oral delayed release tablet  -- 1 tab(s) by mouth once a day  -- Indication: For GERD/PPI    levothyroxine 75 mcg (0.075 mg) oral tablet  -- 1 tab(s) by mouth once a day  -- Indication: For Hypothyroidism    cholecalciferol 1000 intl units oral tablet  -- 1 tab(s) by mouth once a day  -- Indication: For vitamin supplementation    cyanocobalamin 1000 mcg sublingual tablet  -- 1 tab(s) by mouth once a day  -- Indication: For vitamin supplementation I will START or STAY ON the medications listed below when I get home from the hospital:    finasteride 5 mg oral tablet  -- 1 tab(s) by mouth once a day  -- Indication: For BPH (benign prostatic hyperplasia)    aspirin 81 mg oral tablet, chewable  -- 1 tab(s) by mouth once a day  -- Indication: For Analgesic    Ranexa 1000 mg oral tablet, extended release  -- 1 tab(s) by mouth 2 times a day  -- Indication: For Anti-anginal     sertraline 100 mg oral tablet  -- 1 tab(s) by mouth once a day  -- Indication: For depression    atorvastatin 80 mg oral tablet  -- 1 tab(s) by mouth once a day (at bedtime)  -- Indication: For Coronary artery disease    clopidogrel 75 mg oral tablet  -- 1 tab(s) by mouth once a day  -- Indication: For Coronary artery disease    ALPRAZolam 0.25 mg oral tablet  -- 1 tab(s) by mouth 2 times a day  -- Indication: For Anxiety    ALPRAZolam 0.25 mg oral tablet  -- 1 tab(s) by mouth once a day, As needed, for anxiety  -- Indication: For Anxiety    metoprolol tartrate 25 mg oral tablet  -- 1 tab(s) by mouth 2 times a day  -- Indication: For Hypertension/Coronary artery disease    amLODIPine 5 mg oral tablet  -- 1 tab(s) by mouth once a day   -- It is very important that you take or use this exactly as directed.  Do not skip doses or discontinue unless directed by your doctor.  Some non-prescription drugs may aggravate your condition.  Read all labels carefully.  If a warning appears, check with your doctor before taking.    -- Indication: For HTN (hypertension)    bethanechol 25 mg oral tablet  -- 1 tab(s) by mouth 2 times a day  -- Indication: For urinary retention     azelastine 137 mcg/inh (0.1%) nasal spray  -- 2 spray(s) into nose 2 times a day  -- Indication: For nasal spray    Alrex 0.2% ophthalmic suspension  -- 1 drop(s) in each eye once a day  -- Indication: For eye drops    pantoprazole 40 mg oral delayed release tablet  -- 1 tab(s) by mouth once a day  -- Indication: For GERD/PPI    levothyroxine 75 mcg (0.075 mg) oral tablet  -- 1 tab(s) by mouth once a day  -- Indication: For Hypothyroidism    cholecalciferol 1000 intl units oral tablet  -- 1 tab(s) by mouth once a day  -- Indication: For vitamin supplementation    cyanocobalamin 1000 mcg sublingual tablet  -- 1 tab(s) by mouth once a day  -- Indication: For vitamin supplementation

## 2019-01-25 NOTE — CONSULT NOTE ADULT - REASON FOR ADMISSION
slurred speech, difficulty talking

## 2019-01-25 NOTE — PROGRESS NOTE ADULT - SUBJECTIVE AND OBJECTIVE BOX
Pt has been seen and examined with FP resident, resident supervised agree with a/p       Patient is a 89y old  Male who presents with a chief complaint of slurred speech, difficulty talking (25 Jan 2019 11:35)        HPI:  89 y.o. male with PMH HTN, HLD, nonobstructive CAD, carotid stenosis 99%, skin ca s/p surgery, anxiety, BPH, prostate ca with injection (25 yr ago), hypothyroidism presents with slurred speech and difficulty talking.    PHYSICAL EXAM:  Vital Signs Last 24 Hrs  T(C): 36.4 (25 Jan 2019 11:02), Max: 36.4 (25 Jan 2019 11:02)  T(F): 97.5 (25 Jan 2019 11:02), Max: 97.5 (25 Jan 2019 11:02)  HR: 62 (25 Jan 2019 11:02) (62 - 70)  BP: 142/67 (25 Jan 2019 11:02) (119/45 - 164/56)  BP(mean): --  RR: 18 (25 Jan 2019 11:02) (18 - 18)  SpO2: 95% (25 Jan 2019 11:02) (95% - 96%)  general- comfortable   -rs-aeeb,cta  -cvs-s1s2 normal   -p/a-soft,bs+          A/P    #Ct asa/statin, neurology evaluation for mri finding     #Discussed with Dr. Douglas    #ct supportive care, improvement in speech noted today

## 2019-01-25 NOTE — DISCHARGE NOTE ADULT - SECONDARY DIAGNOSIS.
NEVA (acute kidney injury) HTN (hypertension) Carotid stenosis Hypothyroidism BPH (benign prostatic hyperplasia) Anxiety

## 2019-01-25 NOTE — PROGRESS NOTE ADULT - SUBJECTIVE AND OBJECTIVE BOX
CURRENT CARDIAC WORKUP:       Echo:  Stress Test:  Cardiac Cath:    Allergies:   No Known Allergies      MEDICATIONS  (STANDING):  ALPRAZolam 0.25 milliGRAM(s) Oral two times a day  atorvastatin 80 milliGRAM(s) Oral at bedtime  azelastine 0.1% Nasal Spray 2 Spray(s) Both Nostrils two times a day  bethanechol 25 milliGRAM(s) Oral two times a day  cefTRIAXone Injectable. 1000 milliGRAM(s) IV Push every 24 hours  cefTRIAXone Injectable.      cholecalciferol 1000 Unit(s) Oral daily  finasteride 5 milliGRAM(s) Oral daily  levothyroxine 75 MICROGram(s) Oral daily  loteprednol 0.2% Ophthalmic Suspension 1 Drop(s) Both EYES daily  pantoprazole    Tablet 40 milliGRAM(s) Oral before breakfast  ranolazine 1000 milliGRAM(s) Oral two times a day  sertraline 100 milliGRAM(s) Oral daily    MEDICATIONS  (PRN):  ALPRAZolam 0.25 milliGRAM(s) Oral daily PRN for anxiety      ROS:     .ros      Vital Signs Last 24 Hrs  T(C): 36.3 (2019 05:20), Max: 37 (2019 11:17)  T(F): 97.4 (2019 05:20), Max: 98.6 (2019 11:17)  HR: 63 (2019 05:20) (63 - 72)  BP: 164/56 (2019 05:20) (116/44 - 164/56)  BP(mean): --  RR: 18 (2019 05:20) (18 - 18)  SpO2: 95% (2019 05:20) (95% - 98%)    I&O's Summary      PHYSICAL EXAM:    .phy      INTERPRETATION OF TELEMETRY:    ECG:    LABS:                        9.5    6.03  )-----------( 140      ( 2019 05:45 )             29.8     -25    141  |  110<H>  |  34<H>  ----------------------------<  82  4.1   |  24  |  1.56<H>    Ca    8.3<L>      2019 05:45    TPro  7.4  /  Alb  3.5  /  TBili  0.8  /  DBili  x   /  AST  58<H>  /  ALT  36  /  AlkPhos  62        Lipid Panel  Chl 140  HDL 75  LDL 54  Trg 55         @ 10:51  Trop-I 0.138  CK     --  CK MB  --     @ 04:16  Trop-I 0.160  CK     --  CK MB  --     @ 14:03  Trop-I 0.093  CK     --  CK MB  --        Urinalysis Basic - ( 2019 16:10 )    Color: Yellow / Appearance: Clear / S.020 / pH: x  Gluc: x / Ketone: Trace  / Bili: Small / Urobili: 1 mg/dL   Blood: x / Protein: 30 mg/dL / Nitrite: Positive   Leuk Esterase: Trace / RBC: 0-2 /HPF / WBC 3-5   Sq Epi: x / Non Sq Epi: Occasional / Bacteria: Moderate            RADIOLOGY & ADDITIONAL STUDIES: 89-year-old male admitted with complaints of slurred speech that has resolved spontaneously. Diagnosed with a TIA. Patient has had a history of severe aortic stenosis and severe right internal carotid artery stenosis. He had refused invasive treatment for both in the past.    Today, no new events. Yesterday had a brief paroxysm of atrial flutter, spontaneously converted to sinus    PAST MEDICAL & SURGICAL HISTORY:  Skin cancer  SATISH Carotid stenosis  Severe AS  HTN (hypertension)  HLD  CKD      PREVIOUS CARDIAC WORKUP:      Echo:  10/18 Nml EF, severe AS, mod MR, mod TR, moderate PAH  Stress Test:  Cardiac Cath:    Allergies:   No Known Allergies      MEDICATIONS  (STANDING):  ALPRAZolam 0.25 milliGRAM(s) Oral two times a day  atorvastatin 80 milliGRAM(s) Oral at bedtime  azelastine 0.1% Nasal Spray 2 Spray(s) Both Nostrils two times a day  bethanechol 25 milliGRAM(s) Oral two times a day  cefTRIAXone Injectable. 1000 milliGRAM(s) IV Push every 24 hours  cefTRIAXone Injectable.      cholecalciferol 1000 Unit(s) Oral daily  finasteride 5 milliGRAM(s) Oral daily  levothyroxine 75 MICROGram(s) Oral daily  loteprednol 0.2% Ophthalmic Suspension 1 Drop(s) Both EYES daily  pantoprazole    Tablet 40 milliGRAM(s) Oral before breakfast  ranolazine 1000 milliGRAM(s) Oral two times a day  sertraline 100 milliGRAM(s) Oral daily    MEDICATIONS  (PRN):  ALPRAZolam 0.25 milliGRAM(s) Oral daily PRN for anxiety      ROS:     Detailed ten system ROS was performed and was negative except for history as eluded to above.    no fever  no chills  no nausea  no vomiting  no diarrhea  no constipation  no melena  no hematochezia  no chest pain  no palpitations  no sob at rest  no dyspnea on exertion  no cough  no wheezing  no anorexia  no headache  no dizziness  no syncope  no weakness  no myalgia  no dysuria  no polyuria  no hematuria       Vital Signs Last 24 Hrs  T(C): 36.3 (25 Jan 2019 05:20), Max: 37 (24 Jan 2019 11:17)  T(F): 97.4 (25 Jan 2019 05:20), Max: 98.6 (24 Jan 2019 11:17)  HR: 63 (25 Jan 2019 05:20) (63 - 72)  BP: 164/56 (25 Jan 2019 05:20) (116/44 - 164/56)  BP(mean): --  RR: 18 (25 Jan 2019 05:20) (18 - 18)  SpO2: 95% (25 Jan 2019 05:20) (95% - 98%)    I&O's Summary      PHYSICAL EXAM:    General:                Comfortable, AAO X 3, in no distress.  HEENT:                  Atraumatic, PERRLA, EOMI, conjunctiva clear.    Neck:                     Supple, no adenopathy, no thyromegaly, no JVD, + bruit.  Back:                     Symmetric, non tender.  Chest:                    Clear, B/L symmetric air entry, no tachypnea  Heart:                     S1, S2 normal, +S4, + murmur.  Abdomen:              Soft, non-tender, bowel sounds active. No palpable masses.  Extremities:           no cyanosis, no edema. Peripheral pulses normal.  Skin:                      Skin color, texture normal. No rashes.  Neurologic:            Grossly nonfocal.       TELEMETRY:      ECG:  NSR, PVCs, septal infarct pattern      LABS:                        9.5    6.03  )-----------( 140      ( 25 Jan 2019 05:45 )             29.8     01-25    141  |  110<H>  |  34<H>  ----------------------------<  82  4.1   |  24  |  1.56<H>    Ca    8.3<L>      25 Jan 2019 05:45    TPro  7.4  /  Alb  3.5  /  TBili  0.8  /  DBili  x   /  AST  58<H>  /  ALT  36  /  AlkPhos  62  01-23      Lipid Panel  Chl 140  HDL 75  LDL 54  Trg 55        01-24 @ 10:51  Trop-I 0.138    01-24 @ 04:16  Trop-I  0.160    01-23 @ 14:03  Trop-I  0.093        RADIOLOGY & ADDITIONAL STUDIES:    MRI brain and MR Angio Neck, Head No Cont (01.24.19 @ 19:37) >  IMPRESSION:    MRI brain: Diffuse FLAIR hyperintense signal throughout the sulci of the bilateral cerebri and a tiny subdural collection overlying the right occipital lobe, nonspecific, may reflect hemorrhage or cellular material from infection or inflammation. There are bilateral whole hemispheric subdural fluid collections measuring up to 0.9 cm overlying the bilateral frontal lobes. No significant mass effect or midline shift.    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 There is a normal congenital variant anastomosis from the cavernous segment of the left internal carotid artery to the basilar artery that remains widely patent.     MRA neck: High-grade focal stenosis of the proximal right internal carotid artery with distal reconstitution of flow.

## 2019-01-25 NOTE — PROVIDER CONTACT NOTE (OTHER) - SITUATION
PA and office aware
?seizure, likely stroke  Service aware - [facility]
OFFICE IS AWARE OF CONSULT
Pt troponin raised from 0.093 to 0.160. pt vitals are /66 HR 78 temp 97.7 resp: 16 and o2 sat 96%. pt states he is not feeling chest heaviness or pain. no tingling down his arm or numbness.

## 2019-01-25 NOTE — PROGRESS NOTE ADULT - SUBJECTIVE AND OBJECTIVE BOX
SUBJECTIVE: 89 y/o M with PMH HTN, HLD, nonobstructive CAD, carotid stenosis 99%, skin ca s/p surgery, anxiety, BPH, prostate ca with injection (25 yr ago), hypothyroidism presented with slurred speech and difficulty talking with onset 1/23. As per pt daughter, Pt was able to comprehend but not speak. Pt began slurring his speech which was almost resolved back to baseline in the ED. There was no aggravating or palliating factors. Pt had an episode of ?seizure in the ambulance. Denies tongue biting, shaking, dizziness, lightheaded, abnormal smell or taste. Pt denies fever, chills, CP, SOB, abd pain, dysuria, or diarrhea.     1/25: Pt was seen and examined. Pt speech has improved compared to yesterday. Pt endorses as well and so is the daughter. Pt this morning reported left shoulder pain 2/2 sleeping on his hand. Reports its improving. No other acute complaints. No issues overnight. Pt MRI results explained to him and family. Pt does not want any surgery to be done but agrees to see neurosurgeon to know what they recommend. Neurosurgery PA has been notified and will see pt. Denies fever, chills, CP, SOB, headache, N/V/D/C.    REVIEW OF SYSTEMS:  CONSTITUTIONAL: No weakness, fevers or chills  EYES/ENT: No visual changes;  No vertigo or throat pain   NECK: No pain or stiffness  RESPIRATORY: No cough, wheezing, hemoptysis; No shortness of breath  CARDIOVASCULAR: No chest pain or palpitations  GASTROINTESTINAL: No abdominal or epigastric pain. No nausea, vomiting, or hematemesis; No diarrhea or constipation. No melena or hematochezia.  GENITOURINARY: No dysuria, frequency or hematuria  NEUROLOGICAL: No numbness or weakness  SKIN: No itching, burning, rashes, or lesions   All other review of systems is negative unless indicated above    Vital Signs Last 24 Hrs  T(C): 36.4 (25 Jan 2019 11:02), Max: 36.4 (25 Jan 2019 11:02)  T(F): 97.5 (25 Jan 2019 11:02), Max: 97.5 (25 Jan 2019 11:02)  HR: 62 (25 Jan 2019 11:02) (62 - 70)  BP: 142/67 (25 Jan 2019 11:02) (119/45 - 164/56)  RR: 18 (25 Jan 2019 11:02) (18 - 18)  SpO2: 95% (25 Jan 2019 11:02) (95% - 96%)      PHYSICAL EXAM:  Constitutional: NAD, awake and alert, well-developed  HEENT: PERR, EOMI, Normal Hearing, MMM  Neck: Soft and supple, No LAD, No JVD  Respiratory: Breath sounds are clear bilaterally, No wheezing, rales or rhonchi  Cardiovascular: S1 and S2, regular rate and rhythm  Gastrointestinal: Bowel Sounds present, soft, nontender, nondistended, no guarding, no rebound  Extremities: No peripheral edema  Vascular: 2+ peripheral pulses  Neurological: A/O x 3, no focal deficits  Musculoskeletal: Normal strength, appropriate for age  Skin: No rashes    MEDICATIONS:  MEDICATIONS  (STANDING):  ALPRAZolam 0.25 milliGRAM(s) Oral two times a day  atorvastatin 80 milliGRAM(s) Oral at bedtime  azelastine 0.1% Nasal Spray 2 Spray(s) Both Nostrils two times a day  bethanechol 25 milliGRAM(s) Oral two times a day  cefTRIAXone Injectable. 1000 milliGRAM(s) IV Push every 24 hours  cefTRIAXone Injectable.      cholecalciferol 1000 Unit(s) Oral daily  finasteride 5 milliGRAM(s) Oral daily  levothyroxine 75 MICROGram(s) Oral daily  loteprednol 0.2% Ophthalmic Suspension 1 Drop(s) Both EYES daily  pantoprazole    Tablet 40 milliGRAM(s) Oral before breakfast  ranolazine 1000 milliGRAM(s) Oral two times a day  sertraline 100 milliGRAM(s) Oral daily      LABS: All Labs Reviewed:                        9.5    6.03  )-----------( 140      ( 25 Jan 2019 05:45 )             29.8     01-25    141  |  110<H>  |  34<H>  ----------------------------<  82  4.1   |  24  |  1.56<H>    Ca    8.3<L>      25 Jan 2019 05:45        CARDIAC MARKERS ( 24 Jan 2019 10:51 )  0.138 ng/mL / x     / x     / x     / x      CARDIAC MARKERS ( 24 Jan 2019 04:16 )  0.160 ng/mL / x     / x     / x     / x            RADIOLOGY/EKG:    < from: MR Angio Neck No Cont (01.24.19 @ 19:37) >  EXAM:  MR ANGIO BRAIN                          EXAM:  MR ANGIO NECK                          EXAM:  MR BRAIN                            PROCEDURE DATE:  01/24/2019          INTERPRETATION:  Exam Type: MRI BRAIN, MRA COW, MRA NECK WITHOUT CONTRAST   Indication: slurred speech  Comparison:CT head 1/23/2019    Technique: Multiplanar MR imaging of the brain was obtained without   contrast. Time of flight MRA of the neck and [place] were   obtained.    Findings:    Brain: There is diffuse FLAIR hyperintense signal throughout the sulci of   the bilateral cerebri and a tiny subdural collection overlying the right   occipital lobe, nonspecific, may reflect hemorrhage or cellular material   from infection or inflammation. There are bilateral whole hemispheric   subdural fluid collections measuring up to 0.9 cm overlying the bilateral   frontal lobes. No acute infarct. There is no mass effect or midline   shift.  Cerebellar tonsils are in normal location. The intracranial flow   voids are normal in appearance. Visualized paranasal sinuses and mastoids   are normal in signal.    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 There is a normal congenital variant   anastomosis from the cavernous segment of the left internal carotid   artery to the basilar artery that remains widely patent. The posterior   cerebral arteries are grossly patent. The anterior circulation is grossly   patent. Diffuse atherosclerotic changes of theintracranial circulation   are present.      MRA of the neck:  High-grade focal stenosis of the proximal right   internal carotid artery with distal reconstitution of flow. Right common   carotid artery and right external carotid artery are unremarkable. Left   common carotid artery, left internal carotid artery and left external   carotid artery are grossly intact.        IMPRESSION:    MRI brain: Diffuse FLAIR hyperintense signal throughout the sulci of the   bilateral cerebri and a tiny subdural collection overlying the right   occipital lobe, nonspecific, may reflect hemorrhage or cellular material   from infection or inflammation. There are bilateral whole hemispheric   subdural fluid collections measuring up to 0.9 cm overlying the bilateral   frontal lobes. No significant mass effect or midline shift.    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 There is a normal congenital variant anastomosis from the   cavernous segment of the left internal carotid artery to the basilar   artery that remains widely patent.     MRA neck: High-grade focal stenosis of the proximal right internal   carotid artery with distal reconstitution of flow.     These findings were discussed with Dr. Yates at 8:00 PM on 1/24/2019.    < end of copied text >

## 2019-01-25 NOTE — DISCHARGE NOTE ADULT - OTHER SIGNIFICANT FINDINGS
CT HEAD  1/23/19    IMPRESSION:    No acute intracranial hemorrhage, mass effect or midline shift.    Mild cerebral atrophy, advanced since the CT of 7/8/06.    Chronic mild bilateral cerebral white matter microvascular changes.      MR HEAD NON CONT  1/24/19    IMPRESSION:    MRI brain: Diffuse FLAIR hyperintense signal throughout the sulci of the   bilateral cerebri and a tiny subdural collection overlying the right   occipital lobe, nonspecific, may reflect hemorrhage or cellular material   from infection or inflammation. There are bilateral whole hemispheric   subdural fluid collections measuring up to 0.9 cm overlying the bilateral   frontal lobes. No significant mass effect or midline shift.    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 There is a normal congenital variant anastomosis from the   cavernous segment of the left internal carotid artery to the basilar   artery that remains widely patent.     MRA neck: High-grade focal stenosis of the proximal right internal   carotid artery with distal reconstitution of flow.

## 2019-01-25 NOTE — PROGRESS NOTE ADULT - ASSESSMENT
Assessment and Recommendation:   · Assessment		  89 y.o. male with PMH HTN, HLD, nonobstructive CAD, carotid stenosis 99%, skin ca s/p surgery, anxiety, BPH, prostate ca with injection (25 yr ago), hypothyroidism presents with slurred speech and difficulty talking. Pt also with slurred speech. EMS was contacted and per ED note, pt had ?seizure while on the ambulance.    -slurred speech, difficulty speaking R/o  TIA, possible seizure in Ambulance  -confusional state possible toxic encephalopathy vs TIA  -Agree with MRI Ch Subdural collection bifrontal ? atrophy? traumatic. If family wants consider NS consult or F/u scan as out Pt  - MRA RT ICA stenosis known in past as per Dr. Ordoñez. Seen by Vascular surgery Rec compare to prior Doppler results.  -EEG mild slow appropriate for age.  -Correct underlying metabolic causes.  -Pt  for ambulation  - Consult Dr. Lorenzo done  - Continue Aspirin and  statin.  - Willl discuss with Dr. Nelson  -D/c planning

## 2019-01-25 NOTE — DISCHARGE NOTE ADULT - NSTOBACCOHOTLINE_GEN_A_CS
[facility] Smokers Quitline (731-SO-GGJDT) [facility] Smokers Quitline (006-OR-BNKKV) [facility] Smokers Quitline ([phone]-WT-[id number])

## 2019-01-25 NOTE — DISCHARGE NOTE ADULT - MEDICATION SUMMARY - MEDICATIONS TO STOP TAKING
I will STOP taking the medications listed below when I get home from the hospital:    rosuvastatin 20 mg oral tablet  -- 1 tab(s) by mouth once a day (at bedtime) I will STOP taking the medications listed below when I get home from the hospital:    losartan 25 mg oral tablet  -- 1 tab(s) by mouth once a day    rosuvastatin 20 mg oral tablet  -- 1 tab(s) by mouth once a day (at bedtime)

## 2019-01-25 NOTE — DISCHARGE NOTE ADULT - CARE PROVIDER_API CALL
Bro Sanchez), Medicine  [address] [place] [address]  Phone: [phone]  Fax: [phone]    Zak Lorenzo (MD), Cardiovascular Disease; Internal Medicine  [address] [place] [address]  Phone: [phone]  Fax: [phone] Bro Sanchez), Medicine  [address] [place] [address]  Phone: [phone]  Fax: [phone]    Zak Lorenzo (MD), Cardiovascular Disease; Internal Medicine  [address] [place] [address]  Phone: [phone]  Fax: [phone]

## 2019-01-25 NOTE — PROGRESS NOTE ADULT - ASSESSMENT
89 y/o M with PMH HTN, HLD, nonobstructive CAD, carotid stenosis 99%, skin ca s/p surgery, anxiety, BPH, prostate ca with injection (25 yr ago), hypothyroidism presented with slurred speech and difficulty talking. Pt had elevated trops in the ED. Concern for TIA vs CVA.       #slurred speech, difficulty speaking, confusion concerning for CVA; possible toxic encephalopathy  - CT head negative  - EEG negative.  - MRI/A head & neck noted above. Concern for ?hemorrhage vs infection or inflammation. High grade focal stenosis at proximal SATISH with distal reconstitution of flow.   - F/u Neurosurgery consult  - Continue neuro checks  - Speech eval appreciated  - Neuro, and PT consult appreciated  - Will consider d/c to rehab once stable  - Continue to monitor    #CAD with elevated troponin  - likely from NEVA vs Demand Ischemia  - Improving  - Trop trend: 0.093 -> 0.160v-> 0.138  - serial EKG  - continue ASA, statin  - Lipid panel & A1c wnl  - Cardio consult appreciated    #NEVA likely 2/2 dehydration  - Improving  - IV hydration and monitor renal function  - hold ARB  - Continue to monitor    #possible UTI  - F/u Urine cx  - Continue Ceftriaxone. Day 2    #CAD, carotid stenosis  - h/o severe AS and or SATISH stenosis. Pt wants conservative care  - MR neck noted high grade stenosis of SATISH.   - cont ASA, statin  - cardio and vascular surgery consult appreciated  - F/u palliative consult    #hypothyroidism  - cont synthroid    #Anemia likely dilutional  - Continue to monitor    #DVT ppx  -SCDs 89 y/o M with PMH HTN, HLD, nonobstructive CAD, carotid stenosis 99%, skin ca s/p surgery, anxiety, BPH, prostate ca with injection (25 yr ago), hypothyroidism presented with slurred speech and difficulty talking. Pt had elevated trops in the ED. Concern for TIA vs CVA.       #slurred speech, difficulty speaking, confusion concerning for CVA; possible toxic encephalopathy  - CT head negative  - EEG negative.  - MRI/A head & neck noted above. Concern for ?hemorrhage vs infection or inflammation. High grade focal stenosis at proximal SATISH with distal reconstitution of flow.   - F/u Neurosurgery consult  - Continue neuro checks  - Speech eval appreciated  - Neuro, and PT consult appreciated  - Will consider d/c to rehab once stable  - Continue to monitor    #CAD with elevated troponin  - likely from NEVA vs Demand Ischemia  - Improving  - Trop trend: 0.093 -> 0.160v-> 0.138  - serial EKG  - continue ASA, statin  - Lipid panel & A1c wnl  - Cardio consult appreciated    #NEVA likely 2/2 dehydration  - Improving  - IV hydration and monitor renal function  - hold ARB  - Continue to monitor    #possible UTI  - F/u Urine cx  - Continue Ceftriaxone. Day 2    #CAD, carotid stenosis  - h/o severe AS and or SATISH stenosis. Pt wants conservative care  - MR neck noted high grade stenosis of SATISH.   - cont ASA, statin  - cardio and vascular surgery consult appreciated  - F/u palliative consult    #hypothyroidism  - cont synthroid    #Anemia likely dilutional  - Continue to monitor    #DVT ppx  - hep sq

## 2019-01-25 NOTE — DISCHARGE NOTE ADULT - PLAN OF CARE
Return to baseline functioning Slurred speech resolved  MRA brain-Hemorrhage or cellular material from infection or inflammation.  Seen by Neurosurgery. Conservative management recommended and no acute surgical intervention Avoid medications that worsen kidney function as appropriate Resolving   Initial Creatinine 3.08, improved to 1.32  Encourage hydration with fluids Maintain blood pressure within normal range Continue home medications Continue physical activity and avoid over exertion Stenosis of Right Internal carotid artery and of the aortic valve  No surgical intervention will be pursued Continue to maintain thyroid levels within normal limits Continue home medication Maintain good urine output Avoid episodes of anxiety - Continue home medication   -Avoid benzodiazepine medications (side effect to beware of is delirium/ altered mental status ) Continue  medications as prescribed. Take Norvasc 2.5 mg daily. - Continue home medication Continue  medications as prescribed. Take Norvasc 5 mg daily.  follow up with pmd

## 2019-01-25 NOTE — DISCHARGE NOTE ADULT - ADDITIONAL INSTRUCTIONS
If slurred speech, weakness, chest pain, shortness of breath, loss of consciousness or headache occur return to the Emergency Department.   Follow up with your primary doctor, Dr. Sanchez and Cardiologist Dr. Lorenzo. If slurred speech, weakness, chest pain, shortness of breath, loss of consciousness or headache occur return to the Emergency Department.   Follow up with your primary doctor, Dr. Sanchez and Cardiologist Dr. Lorenzo.  Attempted to reach your PCP, Dr. Sanchez, message left with answering service.

## 2019-01-25 NOTE — DISCHARGE NOTE ADULT - HOSPITAL COURSE
89 y/o M with PMH HTN, HLD, nonobstructive CAD, carotid stenosis 99%, skin ca s/p surgery, anxiety, BPH, prostate ca with injection (25 yr ago), hypothyroidism was admitted with complaints of slurred speech and difficulty talking, concerned for CVA. Pt has elevated troponin on admission possible related to NEVA and demand ischemia. Pt NEVA resolve with IVF. Pt showed improvement in speech since admission. Scans done was noted for possible hemorrhage vs cellular infiltrate concerning for infection vs inflammation. Scan also noted high grade stenosis in SATISH. Pt prefers conservative management and does not want any surgery. Pt will be managed medically and hemodynamically stable to be discharged to rehab for strength building exercises. 89 y/o M with PMH HTN, HLD, nonobstructive CAD, carotid stenosis 99%, skin ca s/p surgery, anxiety, BPH, prostate ca with injection (25 yr ago), hypothyroidism was admitted with complaints of slurred speech and difficulty talking, concerned for CVA. Pt has elevated troponin on admission possible related to NEVA and demand ischemia. Pt NEVA resolve with IVF. Pt showed improvement in speech since admission. Scans done was noted for possible hemorrhage vs cellular infiltrate concerning for infection vs inflammation. Scan also noted high grade stenosis in SATISH. Pt prefers conservative management and does not want any surgery. Pt will be managed medically and hemodynamically stable to be discharged to rehab for strength building exercises. Patient to follow up with primary Doctor Dr. Sanchez and Cardiologist, Dr. Walker after discharge. 89 y/o M with PMH HTN, HLD, nonobstructive CAD, carotid stenosis 99%, skin ca s/p surgery, anxiety, BPH, prostate ca with injection (25 yr ago), hypothyroidism was admitted with complaints of slurred speech and difficulty talking, concerned for toxic encephalopathy vs TIA. Pt has elevated troponin on admission possible related to NEVA and demand ischemia. Pt NEVA resolve with IVF. Pt showed improvement in speech since admission. Scans done was noted for possible hemorrhage vs cellular infiltrate concerning for infection vs inflammation. Scan also noted high grade stenosis in SATISH. Pt prefers conservative management and does not want any surgery. Pt will be managed medically and hemodynamically stable to be discharged to rehab for strength building exercises. Patient to follow up with primary Doctor Dr. Sanchez and Cardiologist, Dr. Walker after discharge.

## 2019-01-25 NOTE — PROGRESS NOTE ADULT - SUBJECTIVE AND OBJECTIVE BOX
HPI:  89 y.o. male with PMH HTN, HLD, nonobstructive CAD, carotid stenosis 99%, skin ca s/p surgery, anxiety, BPH, prostate ca with injection (25 yr ago), hypothyroidism presents with slurred speech and difficulty talking. Pt's daughter Tarsha at bedside with son in law and grand daughter. Pt usually speaks with daughter on daily basis for at least 30 minutes. Yesterday, while talking, pt appears to comprehend, but unable to speak. Pt also with slurred speech. EMS was contacted and per ED note, pt had ?seizure while on the ambulance. Pt denies fever, chills, CP, SOB, abd pain, dysuria, or diarrhea. Pt reports right sided hip pain that is not new. Currently, pt's daughter states the symptoms is almost back to normal. Per daughter, pt initially unable to recognize her and only later would he recognize her. Pt baseline walks walker/cane. Does all ADLs including driving. Pt does have frequent falls recently. Pt states his daughter takes him for shopping and h/o cardiac problems being followed by Dr. Lorenzo.   1/25/19 : Pt sitting in bed , eating Breakfast , denies of any new c/o. Known Rt Carotid stenosis( occlusion on dopplers in office)) per Dr. Ordoñez , Asymptomatic. Till day of admission. MRI results as below.     MEDICATIONS  (STANDING):  ALPRAZolam 0.25 milliGRAM(s) Oral two times a day  atorvastatin 80 milliGRAM(s) Oral at bedtime  azelastine 0.1% Nasal Spray 2 Spray(s) Both Nostrils two times a day  bethanechol 25 milliGRAM(s) Oral two times a day  cefTRIAXone Injectable. 1000 milliGRAM(s) IV Push every 24 hours  cefTRIAXone Injectable.      cholecalciferol 1000 Unit(s) Oral daily  finasteride 5 milliGRAM(s) Oral daily  levothyroxine 75 MICROGram(s) Oral daily  loteprednol 0.2% Ophthalmic Suspension 1 Drop(s) Both EYES daily  pantoprazole    Tablet 40 milliGRAM(s) Oral before breakfast  ranolazine 1000 milliGRAM(s) Oral two times a day  sertraline 100 milliGRAM(s) Oral daily      Vital Signs Last 24 Hrs  T(C): 36.3 (25 Jan 2019 05:20), Max: 36.3 (24 Jan 2019 21:09)  T(F): 97.4 (25 Jan 2019 05:20), Max: 97.4 (24 Jan 2019 21:09)  HR: 63 (25 Jan 2019 05:20) (63 - 70)  BP: 164/56 (25 Jan 2019 05:20) (119/45 - 164/56)  BP(mean): --  RR: 18 (25 Jan 2019 05:20) (18 - 18)  SpO2: 95% (25 Jan 2019 05:20) (95% - 96%)    Neurological exam:  HF: A x O x 3.Poor short term recall.  Appropriately interactive, normal affect. Speech fluent, No Aphasia now.  CN: OLGA, EOMI, VFF, facial sensation normal, no NLFD, tongue midline, gag intact.  Motor: No pronator drift, Strength 5/5 in all 4 ext, normal bulk and tone. finger contractures  in left hand    Sens: Intact to light touch   Reflexes: Symmetric and normal . BJ 2+, BR 2+, KJ 1+, AJ 0+, downgoing toes b/l  Coord:  No FNFA, dysmetria  Gait/Balance:  Cannot test                          9.5    6.03  )-----------( 140      ( 25 Jan 2019 05:45 )             29.8     01-25    141  |  110<H>  |  34<H>  ----------------------------<  82  4.1   |  24  |  1.56<H>    Ca    8.3<L>      25 Jan 2019 05:45    TPro  7.4  /  Alb  3.5  /  TBili  0.8  /  DBili  x   /  AST  58<H>  /  ALT  36  /  AlkPhos  62  01-23 01-24 [id number] 5.3    01-24 Chol 140 LDL 54 HDL 75 Trig 55    Radiology report:  < from: MR Angio Neck No Cont (01.24.19 @ 19:37) >  IMPRESSION:    MRI brain: Diffuse FLAIR hyperintense signal throughout the sulci of the   bilateral cerebri and a tiny subdural collection overlying the right   occipital lobe, nonspecific, may reflect hemorrhage or cellular material   from infection or inflammation. There are bilateral whole hemispheric   subdural fluid collections measuring up to 0.9 cm overlying the bilateral   frontal lobes. No significant mass effect or midline shift.    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 There is a normal congenital variant anastomosis from the   cavernous segment of the left internal carotid artery to the basilar   artery that remains widely patent.     MRA neck: High-grade focal stenosis of the proximal right internal   carotid artery with distal reconstitution of flow.     < from: EEG Awake and Asleep (01.24.19 @ 08:00) >  Impression : This is mildly abnormal EEG due to background slow activity   suggestive of underlying diffuse cerebral dysfunction probably   appropriate for his age. Patient also asleep most of the recording. If   clinically warranted suggest repeat EEG when patient is more awake.

## 2019-01-25 NOTE — DISCHARGE NOTE ADULT - CARE PLAN
Principal Discharge DX:	Hygroma  Goal:	Return to baseline functioning  Assessment and plan of treatment:	Slurred speech resolved  MRA brain-Hemorrhage or cellular material from infection or inflammation.  Seen by Neurosurgery. Conservative management recommended and no acute surgical intervention  Secondary Diagnosis:	NEVA (acute kidney injury)  Goal:	Avoid medications that worsen kidney function as appropriate  Assessment and plan of treatment:	Resolving   Initial Creatinine 3.08, improved to 1.32  Encourage hydration with fluids  Secondary Diagnosis:	HTN (hypertension)  Goal:	Maintain blood pressure within normal range  Assessment and plan of treatment:	Continue home medications  Secondary Diagnosis:	Carotid stenosis  Goal:	Continue physical activity and avoid over exertion  Assessment and plan of treatment:	Stenosis of Right Internal carotid artery and of the aortic valve  No surgical intervention will be pursued  Secondary Diagnosis:	Hypothyroidism  Goal:	Continue to maintain thyroid levels within normal limits  Assessment and plan of treatment:	Continue home medication  Secondary Diagnosis:	BPH (benign prostatic hyperplasia)  Goal:	Maintain good urine output  Assessment and plan of treatment:	Continue home medications  Secondary Diagnosis:	Anxiety  Goal:	Avoid episodes of anxiety  Assessment and plan of treatment:	- Continue home medication   -Avoid benzodiazepine medications (side effect to beware of is delirium/ altered mental status ) Principal Discharge DX:	Hygroma  Goal:	Return to baseline functioning  Assessment and plan of treatment:	Slurred speech resolved  MRA brain-Hemorrhage or cellular material from infection or inflammation.  Seen by Neurosurgery. Conservative management recommended and no acute surgical intervention  Secondary Diagnosis:	NEVA (acute kidney injury)  Goal:	Avoid medications that worsen kidney function as appropriate  Assessment and plan of treatment:	Resolving   Initial Creatinine 3.08, improved to 1.32  Encourage hydration with fluids  Secondary Diagnosis:	HTN (hypertension)  Goal:	Maintain blood pressure within normal range  Assessment and plan of treatment:	Continue  medications as prescribed. Take Norvasc 2.5 mg daily.  Secondary Diagnosis:	Carotid stenosis  Goal:	Continue physical activity and avoid over exertion  Assessment and plan of treatment:	Stenosis of Right Internal carotid artery and of the aortic valve  No surgical intervention will be pursued  Secondary Diagnosis:	Hypothyroidism  Goal:	Continue to maintain thyroid levels within normal limits  Assessment and plan of treatment:	Continue home medication  Secondary Diagnosis:	BPH (benign prostatic hyperplasia)  Goal:	Maintain good urine output  Assessment and plan of treatment:	Continue home medications  Secondary Diagnosis:	Anxiety  Goal:	Avoid episodes of anxiety  Assessment and plan of treatment:	- Continue home medication Principal Discharge DX:	Hygroma  Goal:	Return to baseline functioning  Assessment and plan of treatment:	Slurred speech resolved  MRA brain-Hemorrhage or cellular material from infection or inflammation.  Seen by Neurosurgery. Conservative management recommended and no acute surgical intervention  Secondary Diagnosis:	NEVA (acute kidney injury)  Goal:	Avoid medications that worsen kidney function as appropriate  Assessment and plan of treatment:	Resolving   Initial Creatinine 3.08, improved to 1.32  Encourage hydration with fluids  Secondary Diagnosis:	HTN (hypertension)  Goal:	Maintain blood pressure within normal range  Assessment and plan of treatment:	Continue  medications as prescribed. Take Norvasc 5 mg daily.  follow up with pmd  Secondary Diagnosis:	Carotid stenosis  Goal:	Continue physical activity and avoid over exertion  Assessment and plan of treatment:	Stenosis of Right Internal carotid artery and of the aortic valve  No surgical intervention will be pursued  Secondary Diagnosis:	Hypothyroidism  Goal:	Continue to maintain thyroid levels within normal limits  Assessment and plan of treatment:	Continue home medication  Secondary Diagnosis:	BPH (benign prostatic hyperplasia)  Goal:	Maintain good urine output  Assessment and plan of treatment:	Continue home medications  Secondary Diagnosis:	Anxiety  Goal:	Avoid episodes of anxiety  Assessment and plan of treatment:	- Continue home medication

## 2019-01-25 NOTE — CONSULT NOTE ADULT - ASSESSMENT
89 yo male PMH HTN, HLD, CAD, carotid stenosis, skin ca s/p surgery, anxiety, BPH, prostate ca, hypothyroidism presents with slurred speech and difficulty talking. MRI brain sig for b/l hygromas without mass effect. +UTI, +tropinins.     - No acute neurosurgical intervention  - Discussed at length with pt and pt's family at bedside  - Reconsult PRN    Discussed with Dr Yuen

## 2019-01-25 NOTE — PROGRESS NOTE ADULT - ASSESSMENT
Conservative care given advanced age, severe AS. Pt and family wishes. TIA  SATISH stenosis  Severe AS  Ac on chronic kidney disease.   HTN  HLD    Suggest:    Advanced age pt with known h/o severe AS and severe SATISH stenosis. Pt had wanted conservative care, refuse invasive treatment in the past.   Will suggest medical management.  Hydrate  Follow renal function  Minimally elevated Trop-I. No chest pain. Will follow up.   Echo  Neuro work up  Conservative cardiac care given advanced age, severe AS. Pt and family wishes.

## 2019-01-25 NOTE — CONSULT NOTE ADULT - SUBJECTIVE AND OBJECTIVE BOX
Patient is a 89y old  Male who presents with a chief complaint of slurred speech, difficulty talking (2019 15:12)      HPI:  89 yo male PMH HTN, HLD, CAD, carotid stenosis, skin ca s/p surgery, anxiety, BPH, prostate ca, hypothyroidism presents with slurred speech and difficulty talking. Pt is poor historian, hx obtained from medical record and pt's daughter Tarsha at bedside. Pt usually speaks with daughter on daily basis for at least 30 minutes. On day of admission while talking, pt appears to comprehend, but unable to speak and when he did speech was slurred. EMS was contacted and per daughter when they arrived they noted pt to be having a ? seizure with rigidity and tremor. Pt had another similar episode en route to ER in ambulance. Pt seen by neurology who rec MRI/A for stroke TIA w/u. MRI brain sig for b/l hygromas without mass effect. +UTI, +tropinins. At the time of my exam pt appears to be comfortable, in NAD. Pt denies HA, visual changes, focal numb / ting / weak, word finding difficulty, neck stiffness, photophobia.         PAST MEDICAL & SURGICAL HISTORY:  As above  Skin cancer  Carotid stenosis  HTN (hypertension)  skin surgery, eye lid surgery, hernia repair >30 yr ago        FAMILY HISTORY:  Father-throat ca, stroke,  age ~65; Mother-stomach ca,  age ~65  Noncontributory         Social Hx:  Nonsmoker, no drug or alcohol use        Allergies  No Known Allergies    Intolerances        MEDICATIONS  (STANDING):  ALPRAZolam 0.25 milliGRAM(s) Oral two times a day  aspirin  chewable 81 milliGRAM(s) Oral daily  atorvastatin 80 milliGRAM(s) Oral at bedtime  azelastine 0.1% Nasal Spray 2 Spray(s) Both Nostrils two times a day  bethanechol 25 milliGRAM(s) Oral two times a day  cefTRIAXone Injectable. 1000 milliGRAM(s) IV Push every 24 hours  cefTRIAXone Injectable.      cholecalciferol 1000 Unit(s) Oral daily  finasteride 5 milliGRAM(s) Oral daily  levothyroxine 75 MICROGram(s) Oral daily  loteprednol 0.2% Ophthalmic Suspension 1 Drop(s) Both EYES daily  pantoprazole    Tablet 40 milliGRAM(s) Oral before breakfast  ranolazine 1000 milliGRAM(s) Oral two times a day  sertraline 100 milliGRAM(s) Oral daily       ROS: per HPI, poor historian        Vital Signs Last 24 Hrs  T(C): 36.4 (2019 11:02), Max: 36.4 (2019 11:02)  T(F): 97.5 (2019 11:02), Max: 97.5 (2019 11:02)  HR: 62 (2019 11:02) (62 - 70)  BP: 142/67 (2019 11:02) (119/45 - 164/56)  RR: 18 (2019 11:02) (18 - 18)  SpO2: 95% (2019 11:02) (95% - 96%)        Labs:                        9.5    6.03  )-----------( 140      ( 2019 05:45 )             29.8     141  |  110<H>  |  34<H>  ----------------------------<  82  4.1   |  24  |  1.56<H>    Ca    8.3<L>      2019 05:45    01-24 [id number] 5.3    01-24 Chol 140 LDL 54 HDL 75 Trig 55        Radiology report:  MR brain / Angio Head and neck No Cont (19 @ 19:36) >  MRI brain: Diffuse FLAIR hyperintense signal throughout the sulci of the   bilateral cerebri and a tiny subdural collection overlying the right   occipital lobe, nonspecific, may reflect hemorrhage or cellular material   from infection or inflammation. There are bilateral whole hemispheric   subdural fluid collections measuring up to 0.9 cm overlying the bilateral   frontal lobes. No significant mass effect or midline shift.    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 There is a normal congenital variant anastomosis from the   cavernous segment of the left internal carotid artery to the basilar   artery that remains widely patent.     MRA neck: High-grade focal stenosis of the proximal right internal   carotid artery with distal reconstitution of flow.       Constitutional: awake and alert.  HEENT: PERRLA, EOMI  Neck: Supple  Respiratory: Breath sounds are clear bilaterally  Cardiovascular: S1 and S2, regular  rhythm  Gastrointestinal: soft, nontender  Extremities:  no edema  Musculoskeletal: no joint swelling/tenderness, no abnormal movements  Skin: No rashes    Neurological exam:  HF: A x O x 2, Poor short term recall, follows commands, appropriately interactive, normal affect  CN: PERRL, EOMI, facial sensation normal, no NLFD, tongue midline  Motor: No pronator drift, Strength 5/5 in all 4 ext, normal bulk and tone. finger contractures  in left hand    Sens: Intact to light touch   Reflexes: Symmetric and normal. BJ 2+, BR 2+, KJ 1+, AJ 0+, downgoing toes b/l  Coord:  No FNFA, dysmetria  Gait/Balance:  Cannot test

## 2019-01-25 NOTE — DISCHARGE NOTE ADULT - PATIENT PORTAL LINK FT
You can access the [facility] Patient Portal, offered by [facility], by registering with the following website: [email or web address]

## 2019-01-26 LAB
ADD ON TEST-SPECIMEN IN LAB: SIGNIFICANT CHANGE UP
ANION GAP SERPL CALC-SCNC: 8 MMOL/L — SIGNIFICANT CHANGE UP (ref 5–17)
BASOPHILS # BLD AUTO: 0.01 K/UL — SIGNIFICANT CHANGE UP (ref 0–0.2)
BASOPHILS NFR BLD AUTO: 0.1 % — SIGNIFICANT CHANGE UP (ref 0–2)
BUN SERPL-MCNC: 29 MG/DL — HIGH (ref 7–23)
CALCIUM SERPL-MCNC: 8.4 MG/DL — LOW (ref 8.5–10.1)
CHLORIDE SERPL-SCNC: 104 MMOL/L — SIGNIFICANT CHANGE UP (ref 96–108)
CO2 SERPL-SCNC: 28 MMOL/L — SIGNIFICANT CHANGE UP (ref 22–31)
CREAT SERPL-MCNC: 1.36 MG/DL — HIGH (ref 0.5–1.3)
EOSINOPHIL # BLD AUTO: 0.01 K/UL — SIGNIFICANT CHANGE UP (ref 0–0.5)
EOSINOPHIL NFR BLD AUTO: 0.1 % — SIGNIFICANT CHANGE UP (ref 0–6)
GLUCOSE SERPL-MCNC: 137 MG/DL — HIGH (ref 70–99)
HCT VFR BLD CALC: 30.7 % — LOW (ref 39–50)
HGB BLD-MCNC: 10.2 G/DL — LOW (ref 13–17)
IMM GRANULOCYTES NFR BLD AUTO: 0.6 % — SIGNIFICANT CHANGE UP (ref 0–1.5)
LYMPHOCYTES # BLD AUTO: 0.25 K/UL — LOW (ref 1–3.3)
LYMPHOCYTES # BLD AUTO: 2.7 % — LOW (ref 13–44)
MAGNESIUM SERPL-MCNC: 1.8 MG/DL — SIGNIFICANT CHANGE UP (ref 1.6–2.6)
MCHC RBC-ENTMCNC: 32.9 PG — SIGNIFICANT CHANGE UP (ref 27–34)
MCHC RBC-ENTMCNC: 33.2 GM/DL — SIGNIFICANT CHANGE UP (ref 32–36)
MCV RBC AUTO: 99 FL — SIGNIFICANT CHANGE UP (ref 80–100)
MONOCYTES # BLD AUTO: 0.37 K/UL — SIGNIFICANT CHANGE UP (ref 0–0.9)
MONOCYTES NFR BLD AUTO: 4 % — SIGNIFICANT CHANGE UP (ref 2–14)
NEUTROPHILS # BLD AUTO: 8.61 K/UL — HIGH (ref 1.8–7.4)
NEUTROPHILS NFR BLD AUTO: 92.5 % — HIGH (ref 43–77)
NRBC # BLD: 0 /100 WBCS — SIGNIFICANT CHANGE UP (ref 0–0)
PHOSPHATE SERPL-MCNC: 2.7 MG/DL — SIGNIFICANT CHANGE UP (ref 2.5–4.5)
PLATELET # BLD AUTO: 185 K/UL — SIGNIFICANT CHANGE UP (ref 150–400)
POTASSIUM SERPL-MCNC: 3.6 MMOL/L — SIGNIFICANT CHANGE UP (ref 3.5–5.3)
POTASSIUM SERPL-SCNC: 3.6 MMOL/L — SIGNIFICANT CHANGE UP (ref 3.5–5.3)
RBC # BLD: 3.1 M/UL — LOW (ref 4.2–5.8)
RBC # FLD: 14 % — SIGNIFICANT CHANGE UP (ref 10.3–14.5)
SODIUM SERPL-SCNC: 140 MMOL/L — SIGNIFICANT CHANGE UP (ref 135–145)
TSH SERPL-MCNC: 1.23 UU/ML — SIGNIFICANT CHANGE UP (ref 0.34–4.82)
WBC # BLD: 9.31 K/UL — SIGNIFICANT CHANGE UP (ref 3.8–10.5)
WBC # FLD AUTO: 9.31 K/UL — SIGNIFICANT CHANGE UP (ref 3.8–10.5)

## 2019-01-26 PROCEDURE — 93306 TTE W/DOPPLER COMPLETE: CPT | Mod: 26

## 2019-01-26 PROCEDURE — 93010 ELECTROCARDIOGRAM REPORT: CPT

## 2019-01-26 RX ADMIN — PANTOPRAZOLE SODIUM 40 MILLIGRAM(S): 20 TABLET, DELAYED RELEASE ORAL at 06:20

## 2019-01-26 RX ADMIN — SERTRALINE 100 MILLIGRAM(S): 25 TABLET, FILM COATED ORAL at 11:43

## 2019-01-26 RX ADMIN — FINASTERIDE 5 MILLIGRAM(S): 5 TABLET, FILM COATED ORAL at 11:43

## 2019-01-26 RX ADMIN — RANOLAZINE 1000 MILLIGRAM(S): 500 TABLET, FILM COATED, EXTENDED RELEASE ORAL at 06:20

## 2019-01-26 RX ADMIN — Medication 25 MILLIGRAM(S): at 17:42

## 2019-01-26 RX ADMIN — AZELASTINE 2 SPRAY(S): 137 SPRAY, METERED NASAL at 06:21

## 2019-01-26 RX ADMIN — CEFTRIAXONE 1000 MILLIGRAM(S): 500 INJECTION, POWDER, FOR SOLUTION INTRAMUSCULAR; INTRAVENOUS at 17:44

## 2019-01-26 RX ADMIN — ATORVASTATIN CALCIUM 80 MILLIGRAM(S): 80 TABLET, FILM COATED ORAL at 21:26

## 2019-01-26 RX ADMIN — AZELASTINE 2 SPRAY(S): 137 SPRAY, METERED NASAL at 21:25

## 2019-01-26 RX ADMIN — HEPARIN SODIUM 5000 UNIT(S): 5000 INJECTION INTRAVENOUS; SUBCUTANEOUS at 06:20

## 2019-01-26 RX ADMIN — Medication 0.25 MILLIGRAM(S): at 06:19

## 2019-01-26 RX ADMIN — LOTEPREDNOL ETABONATE 1 DROP(S): 2 SUSPENSION/ DROPS OPHTHALMIC at 11:39

## 2019-01-26 RX ADMIN — HEPARIN SODIUM 5000 UNIT(S): 5000 INJECTION INTRAVENOUS; SUBCUTANEOUS at 17:41

## 2019-01-26 RX ADMIN — Medication 75 MICROGRAM(S): at 06:19

## 2019-01-26 RX ADMIN — Medication 0.25 MILLIGRAM(S): at 17:42

## 2019-01-26 RX ADMIN — Medication 1000 UNIT(S): at 11:41

## 2019-01-26 RX ADMIN — Medication 25 MILLIGRAM(S): at 06:19

## 2019-01-26 RX ADMIN — Medication 81 MILLIGRAM(S): at 11:43

## 2019-01-26 NOTE — PROGRESS NOTE ADULT - SUBJECTIVE AND OBJECTIVE BOX
SUBJECTIVE: 89 y/o M with PMH HTN, HLD, nonobstructive CAD, carotid stenosis 99%, skin ca s/p surgery, anxiety, BPH, prostate ca with injection (25 yr ago), hypothyroidism presented with slurred speech and difficulty talking with onset 1/23. As per pt daughter, Pt was able to comprehend but not speak. Pt began slurring his speech which was almost resolved back to baseline in the ED. There was no aggravating or palliating factors. Pt had an episode of ?seizure in the ambulance. Denies tongue biting, shaking, dizziness, lightheaded, abnormal smell or taste. Pt denies fever, chills, CP, SOB, abd pain, dysuria, or diarrhea.     1/25: Pt was seen and examined. Pt speech has improved compared to yesterday. No other acute complaints. No issues overnight. Discussion held with patient with imaging results and neurosurgery recommendations.  Pt does not want any surgery. Denies fever, chills, CP, SOB, headache, N/V/D/C.    REVIEW OF SYSTEMS:  CONSTITUTIONAL: No weakness, fevers or chills  EYES/ENT: No visual changes;  No vertigo or throat pain   NECK: No pain or stiffness  RESPIRATORY: No cough, wheezing, hemoptysis; No shortness of breath  CARDIOVASCULAR: No chest pain or palpitations  GASTROINTESTINAL: No abdominal or epigastric pain. No nausea, vomiting, or hematemesis; No diarrhea or constipation. No melena or hematochezia.  GENITOURINARY: No dysuria, frequency or hematuria  NEUROLOGICAL: No numbness or weakness  SKIN: No itching, burning, rashes, or lesions   All other review of systems is negative unless indicated above    ICU Vital Signs Last 24 Hrs  T(C): 36.6 (26 Jan 2019 10:45), Max: 36.7 (26 Jan 2019 04:50)  T(F): 97.8 (26 Jan 2019 10:45), Max: 98 (26 Jan 2019 04:50)  HR: 96 (26 Jan 2019 10:45) (68 - 98)  BP: 156/59 (26 Jan 2019 10:45) (144/66 - 166/68)  RR: 17 (26 Jan 2019 10:45) (17 - 18)  SpO2: 100% (26 Jan 2019 10:45) (100% - 100%)      PHYSICAL EXAM:  Constitutional: NAD, awake and alert, well-developed  HEENT: PERR, EOMI, Normal Hearing, MMM  Neck: Soft and supple, No LAD, No JVD  Respiratory: Breath sounds are clear bilaterally, No wheezing, rales or rhonchi  Cardiovascular: S1 and S2, regular rate and rhythm  Gastrointestinal: Bowel Sounds present, soft, nontender, nondistended, no guarding, no rebound  Extremities: No peripheral edema  Vascular: 2+ peripheral pulses  Neurological: A/O x 3, no focal deficits  Musculoskeletal: Normal strength, appropriate for age  Skin: No rashes    MEDICATIONS:  MEDICATIONS  (STANDING):  ALPRAZolam 0.25 milliGRAM(s) Oral two times a day  atorvastatin 80 milliGRAM(s) Oral at bedtime  azelastine 0.1% Nasal Spray 2 Spray(s) Both Nostrils two times a day  bethanechol 25 milliGRAM(s) Oral two times a day  cefTRIAXone Injectable. 1000 milliGRAM(s) IV Push every 24 hours  cefTRIAXone Injectable.      cholecalciferol 1000 Unit(s) Oral daily  finasteride 5 milliGRAM(s) Oral daily  levothyroxine 75 MICROGram(s) Oral daily  loteprednol 0.2% Ophthalmic Suspension 1 Drop(s) Both EYES daily  pantoprazole    Tablet 40 milliGRAM(s) Oral before breakfast  ranolazine 1000 milliGRAM(s) Oral two times a day  sertraline 100 milliGRAM(s) Oral daily      LABS: All Labs Reviewed:                        9.5    6.03  )-----------( 140      ( 25 Jan 2019 05:45 )             29.8     01-25    141  |  110<H>  |  34<H>  ----------------------------<  82  4.1   |  24  |  1.56<H>    Ca    8.3<L>      25 Jan 2019 05:45        CARDIAC MARKERS ( 24 Jan 2019 10:51 )  0.138 ng/mL / x     / x     / x     / x      CARDIAC MARKERS ( 24 Jan 2019 04:16 )  0.160 ng/mL / x     / x     / x     / x            RADIOLOGY/EKG:    < from: MR Angio Neck No Cont (01.24.19 @ 19:37) >  EXAM:  MR ANGIO BRAIN                          EXAM:  MR ANGIO NECK                          EXAM:  MR BRAIN                            PROCEDURE DATE:  01/24/2019          INTERPRETATION:  Exam Type: MRI BRAIN, MRA COW, MRA NECK WITHOUT CONTRAST   Indication: slurred speech  Comparison:CT head 1/23/2019    Technique: Multiplanar MR imaging of the brain was obtained without   contrast. Time of flight MRA of the neck and [place] were   obtained.    Findings:    Brain: There is diffuse FLAIR hyperintense signal throughout the sulci of   the bilateral cerebri and a tiny subdural collection overlying the right   occipital lobe, nonspecific, may reflect hemorrhage or cellular material   from infection or inflammation. There are bilateral whole hemispheric   subdural fluid collections measuring up to 0.9 cm overlying the bilateral   frontal lobes. No acute infarct. There is no mass effect or midline   shift.  Cerebellar tonsils are in normal location. The intracranial flow   voids are normal in appearance. Visualized paranasal sinuses and mastoids   are normal in signal.    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 There is a normal congenital variant   anastomosis from the cavernous segment of the left internal carotid   artery to the basilar artery that remains widely patent. The posterior   cerebral arteries are grossly patent. The anterior circulation is grossly   patent. Diffuse atherosclerotic changes of theintracranial circulation   are present.      MRA of the neck:  High-grade focal stenosis of the proximal right   internal carotid artery with distal reconstitution of flow. Right common   carotid artery and right external carotid artery are unremarkable. Left   common carotid artery, left internal carotid artery and left external   carotid artery are grossly intact.        IMPRESSION:    MRI brain: Diffuse FLAIR hyperintense signal throughout the sulci of the   bilateral cerebri and a tiny subdural collection overlying the right   occipital lobe, nonspecific, may reflect hemorrhage or cellular material   from infection or inflammation. There are bilateral whole hemispheric   subdural fluid collections measuring up to 0.9 cm overlying the bilateral   frontal lobes. No significant mass effect or midline shift.    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 There is a normal congenital variant anastomosis from the   cavernous segment of the left internal carotid artery to the basilar   artery that remains widely patent.     MRA neck: High-grade focal stenosis of the proximal right internal   carotid artery with distal reconstitution of flow.     These findings were discussed with Dr. Yates at 8:00 PM on 1/24/2019.    < end of copied text > SUBJECTIVE: 89 y/o M with PMH HTN, HLD, nonobstructive CAD, carotid stenosis 99%, skin ca s/p surgery, anxiety, BPH, prostate ca with injection (25 yr ago), hypothyroidism presented with slurred speech and difficulty talking with onset 1/23. As per pt daughter, Pt was able to comprehend but not speak. Pt began slurring his speech which was almost resolved back to baseline in the ED. There was no aggravating or palliating factors. Pt had an episode of ?seizure in the ambulance. Denies tongue biting, shaking, dizziness, lightheaded, abnormal smell or taste. Pt denies fever, chills, CP, SOB, abd pain, dysuria, or diarrhea.     1/26/18: Pt was seen and examined. Pt speech has improved compared to yesterday. No other acute complaints. Pt had an episode of aflutter last night which converted back to sinus rhythm. Discussion held with patient with imaging results and neurosurgery recommendations.  Pt does not want any surgery. Discussion held with daughter (keira magdaleno), son in law and grandson with patient permission at bedside and with patient in regards to anticoagulation therapy and how risks outweigh benefits. In addition, pt family agree and understand with no acute intervention with neurosurgery. Physical Therapy will see patient today. Denies fever, chills, CP, SOB, headache, N/V/D/C.    REVIEW OF SYSTEMS:  CONSTITUTIONAL: No weakness, fevers or chills  EYES/ENT: No visual changes;  No vertigo or throat pain   NECK: No pain or stiffness  RESPIRATORY: No cough, wheezing, hemoptysis; No shortness of breath  CARDIOVASCULAR: No chest pain or palpitations  GASTROINTESTINAL: No abdominal or epigastric pain. No nausea, vomiting, or hematemesis; No diarrhea or constipation. No melena or hematochezia.  GENITOURINARY: No dysuria, frequency or hematuria  NEUROLOGICAL: No numbness or weakness  SKIN: No itching, burning, rashes, or lesions   All other review of systems is negative unless indicated above    ICU Vital Signs Last 24 Hrs  T(C): 36.6 (26 Jan 2019 10:45), Max: 36.7 (26 Jan 2019 04:50)  T(F): 97.8 (26 Jan 2019 10:45), Max: 98 (26 Jan 2019 04:50)  HR: 96 (26 Jan 2019 10:45) (68 - 98)  BP: 156/59 (26 Jan 2019 10:45) (144/66 - 166/68)  RR: 17 (26 Jan 2019 10:45) (17 - 18)  SpO2: 100% (26 Jan 2019 10:45) (100% - 100%)      PHYSICAL EXAM:  Constitutional: NAD, awake and alert, well-developed  HEENT: PERR, EOMI, Normal Hearing, MMM  Neck: Soft and supple, No LAD, No JVD  Respiratory: Breath sounds are clear bilaterally, No wheezing, rales or rhonchi  Cardiovascular: S1 and S2, regular rate and rhythm  Gastrointestinal: Bowel Sounds present, soft, nontender, nondistended, no guarding, no rebound  Extremities: No peripheral edema  Vascular: 2+ peripheral pulses  Neurological: A/O x 3, no focal deficits  Musculoskeletal: Normal strength, appropriate for age  Skin: No rashes    MEDICATIONS  (STANDING):  ALPRAZolam 0.25 milliGRAM(s) Oral two times a day  aspirin  chewable 81 milliGRAM(s) Oral daily  atorvastatin 80 milliGRAM(s) Oral at bedtime  azelastine 0.1% Nasal Spray 2 Spray(s) Both Nostrils two times a day  bethanechol 25 milliGRAM(s) Oral two times a day  cefTRIAXone Injectable. 1000 milliGRAM(s) IV Push every 24 hours  cefTRIAXone Injectable.      cholecalciferol 1000 Unit(s) Oral daily  finasteride 5 milliGRAM(s) Oral daily  heparin  Injectable 5000 Unit(s) SubCutaneous every 12 hours  levothyroxine 75 MICROGram(s) Oral daily  loteprednol 0.2% Ophthalmic Suspension 1 Drop(s) Both EYES daily  pantoprazole    Tablet 40 milliGRAM(s) Oral before breakfast  ranolazine 1000 milliGRAM(s) Oral two times a day  sertraline 100 milliGRAM(s) Oral daily    MEDICATIONS  (PRN):  ALPRAZolam 0.25 milliGRAM(s) Oral daily PRN for anxiety      Labs                        10.2   9.31  )-----------( 185      ( 26 Jan 2019 04:27 )             30.7     26 Jan 2019 04:27    140    |  104    |  29     ----------------------------<  137    3.6     |  28     |  1.36     Ca    8.4        26 Jan 2019 04:27  Phos  2.7       26 Jan 2019 04:27  Mg     1.8       26 Jan 2019 04:27        CAPILLARY BLOOD GLUCOSE      Hemoglobin A1C, Whole Blood: 5.3 % (01-24 @ 04:16)      RADIOLOGY/EKG:    < from: MR Angio Neck No Cont (01.24.19 @ 19:37) >  EXAM:  MR ANGIO BRAIN                          EXAM:  MR ANGIO NECK                          EXAM:  MR BRAIN                            PROCEDURE DATE:  01/24/2019          INTERPRETATION:  Exam Type: MRI BRAIN, MRA COW, MRA NECK WITHOUT CONTRAST   Indication: slurred speech  Comparison:CT head 1/23/2019    Technique: Multiplanar MR imaging of the brain was obtained without   contrast. Time of flight MRA of the neck and [place] were   obtained.    Findings:    Brain: There is diffuse FLAIR hyperintense signal throughout the sulci of   the bilateral cerebri and a tiny subdural collection overlying the right   occipital lobe, nonspecific, may reflect hemorrhage or cellular material   from infection or inflammation. There are bilateral whole hemispheric   subdural fluid collections measuring up to 0.9 cm overlying the bilateral   frontal lobes. No acute infarct. There is no mass effect or midline   shift.  Cerebellar tonsils are in normal location. The intracranial flow   voids are normal in appearance. Visualized paranasal sinuses and mastoids   are normal in signal.    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 There is a normal congenital variant   anastomosis from the cavernous segment of the left internal carotid   artery to the basilar artery that remains widely patent. The posterior   cerebral arteries are grossly patent. The anterior circulation is grossly   patent. Diffuse atherosclerotic changes of theintracranial circulation   are present.      MRA of the neck:  High-grade focal stenosis of the proximal right   internal carotid artery with distal reconstitution of flow. Right common   carotid artery and right external carotid artery are unremarkable. Left   common carotid artery, left internal carotid artery and left external   carotid artery are grossly intact.        IMPRESSION:    MRI brain: Diffuse FLAIR hyperintense signal throughout the sulci of the   bilateral cerebri and a tiny subdural collection overlying the right   occipital lobe, nonspecific, may reflect hemorrhage or cellular material   from infection or inflammation. There are bilateral whole hemispheric   subdural fluid collections measuring up to 0.9 cm overlying the bilateral   frontal lobes. No significant mass effect or midline shift.    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 There is a normal congenital variant anastomosis from the   cavernous segment of the left internal carotid artery to the basilar   artery that remains widely patent.     MRA neck: High-grade focal stenosis of the proximal right internal   carotid artery with distal reconstitution of flow.     These findings were discussed with Dr. Yates at 8:00 PM on 1/24/2019.    < end of copied text > SUBJECTIVE: 89 y/o M with PMH HTN, HLD, nonobstructive CAD, carotid stenosis 99%, skin ca s/p surgery, anxiety, BPH, prostate ca with injection (25 yr ago), hypothyroidism presented with slurred speech and difficulty talking with onset 1/23. As per pt daughter, Pt was able to comprehend but not speak. Pt began slurring his speech which was almost resolved back to baseline in the ED. There was no aggravating or palliating factors. Pt had an episode of ?seizure in the ambulance. Denies tongue biting, shaking, dizziness, lightheaded, abnormal smell or taste. Pt denies fever, chills, CP, SOB, abd pain, dysuria, or diarrhea.     1/26/18: Pt was seen and examined. Pt speech has improved compared to yesterday. No other acute complaints. Pt had an episode of aflutter last night which converted back to sinus rhythm. Discussion held with patient with imaging results and neurosurgery recommendations.  Pt does not want any surgery. Discussion held with daughter (keira magdaleno), son in law and grandson with patient permission at bedside and with patient in regards to anticoagulation therapy and how risks outweigh benefits. In addition, pt family agree and understand with no acute intervention with neurosurgery. Pt will be getting echocardiogram today. Denies fever, chills, CP, SOB, headache, N/V/D/C.    REVIEW OF SYSTEMS:  CONSTITUTIONAL: No weakness, fevers or chills  EYES/ENT: No visual changes;  No vertigo or throat pain   NECK: No pain or stiffness  RESPIRATORY: No cough, wheezing, hemoptysis; No shortness of breath  CARDIOVASCULAR: No chest pain or palpitations  GASTROINTESTINAL: No abdominal or epigastric pain. No nausea, vomiting, or hematemesis; No diarrhea or constipation. No melena or hematochezia.  GENITOURINARY: No dysuria, frequency or hematuria  NEUROLOGICAL: No numbness or weakness  SKIN: No itching, burning, rashes, or lesions   All other review of systems is negative unless indicated above    ICU Vital Signs Last 24 Hrs  T(C): 36.6 (26 Jan 2019 10:45), Max: 36.7 (26 Jan 2019 04:50)  T(F): 97.8 (26 Jan 2019 10:45), Max: 98 (26 Jan 2019 04:50)  HR: 96 (26 Jan 2019 10:45) (68 - 98)  BP: 156/59 (26 Jan 2019 10:45) (144/66 - 166/68)  RR: 17 (26 Jan 2019 10:45) (17 - 18)  SpO2: 100% (26 Jan 2019 10:45) (100% - 100%)      PHYSICAL EXAM:  Constitutional: NAD, awake and alert, well-developed  HEENT: PERR, EOMI, Normal Hearing, MMM  Neck: Soft and supple, No LAD, No JVD  Respiratory: Breath sounds are clear bilaterally, No wheezing, rales or rhonchi  Cardiovascular: S1 and S2, regular rate and rhythm  Gastrointestinal: Bowel Sounds present, soft, nontender, nondistended, no guarding, no rebound  Extremities: No peripheral edema  Vascular: 2+ peripheral pulses  Neurological: A/O x 3, no focal deficits  Musculoskeletal: Normal strength, appropriate for age  Skin: No rashes    MEDICATIONS  (STANDING):  ALPRAZolam 0.25 milliGRAM(s) Oral two times a day  aspirin  chewable 81 milliGRAM(s) Oral daily  atorvastatin 80 milliGRAM(s) Oral at bedtime  azelastine 0.1% Nasal Spray 2 Spray(s) Both Nostrils two times a day  bethanechol 25 milliGRAM(s) Oral two times a day  cefTRIAXone Injectable. 1000 milliGRAM(s) IV Push every 24 hours  cefTRIAXone Injectable.      cholecalciferol 1000 Unit(s) Oral daily  finasteride 5 milliGRAM(s) Oral daily  heparin  Injectable 5000 Unit(s) SubCutaneous every 12 hours  levothyroxine 75 MICROGram(s) Oral daily  loteprednol 0.2% Ophthalmic Suspension 1 Drop(s) Both EYES daily  pantoprazole    Tablet 40 milliGRAM(s) Oral before breakfast  ranolazine 1000 milliGRAM(s) Oral two times a day  sertraline 100 milliGRAM(s) Oral daily    MEDICATIONS  (PRN):  ALPRAZolam 0.25 milliGRAM(s) Oral daily PRN for anxiety      Labs                        10.2   9.31  )-----------( 185      ( 26 Jan 2019 04:27 )             30.7     26 Jan 2019 04:27    140    |  104    |  29     ----------------------------<  137    3.6     |  28     |  1.36     Ca    8.4        26 Jan 2019 04:27  Phos  2.7       26 Jan 2019 04:27  Mg     1.8       26 Jan 2019 04:27        CAPILLARY BLOOD GLUCOSE      Hemoglobin A1C, Whole Blood: 5.3 % (01-24 @ 04:16)      RADIOLOGY/EKG:    < from: MR Angio Neck No Cont (01.24.19 @ 19:37) >  EXAM:  MR ANGIO BRAIN                          EXAM:  MR ANGIO NECK                          EXAM:  MR BRAIN                            PROCEDURE DATE:  01/24/2019          INTERPRETATION:  Exam Type: MRI BRAIN, MRA COW, MRA NECK WITHOUT CONTRAST   Indication: slurred speech  Comparison:CT head 1/23/2019    Technique: Multiplanar MR imaging of the brain was obtained without   contrast. Time of flight MRA of the neck and [place] were   obtained.    Findings:    Brain: There is diffuse FLAIR hyperintense signal throughout the sulci of   the bilateral cerebri and a tiny subdural collection overlying the right   occipital lobe, nonspecific, may reflect hemorrhage or cellular material   from infection or inflammation. There are bilateral whole hemispheric   subdural fluid collections measuring up to 0.9 cm overlying the bilateral   frontal lobes. No acute infarct. There is no mass effect or midline   shift.  Cerebellar tonsils are in normal location. The intracranial flow   voids are normal in appearance. Visualized paranasal sinuses and mastoids   are normal in signal.    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 There is a normal congenital variant   anastomosis from the cavernous segment of the left internal carotid   artery to the basilar artery that remains widely patent. The posterior   cerebral arteries are grossly patent. The anterior circulation is grossly   patent. Diffuse atherosclerotic changes of theintracranial circulation   are present.      MRA of the neck:  High-grade focal stenosis of the proximal right   internal carotid artery with distal reconstitution of flow. Right common   carotid artery and right external carotid artery are unremarkable. Left   common carotid artery, left internal carotid artery and left external   carotid artery are grossly intact.        IMPRESSION:    MRI brain: Diffuse FLAIR hyperintense signal throughout the sulci of the   bilateral cerebri and a tiny subdural collection overlying the right   occipital lobe, nonspecific, may reflect hemorrhage or cellular material   from infection or inflammation. There are bilateral whole hemispheric   subdural fluid collections measuring up to 0.9 cm overlying the bilateral   frontal lobes. No significant mass effect or midline shift.    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 There is a normal congenital variant anastomosis from the   cavernous segment of the left internal carotid artery to the basilar   artery that remains widely patent.     MRA neck: High-grade focal stenosis of the proximal right internal   carotid artery with distal reconstitution of flow.     These findings were discussed with Dr. Yates at 8:00 PM on 1/24/2019.    < end of copied text >

## 2019-01-26 NOTE — PROGRESS NOTE ADULT - ASSESSMENT
89 y/o M with PMH HTN, HLD, nonobstructive CAD, carotid stenosis 99%, skin ca s/p surgery, anxiety, BPH, prostate ca with injection (25 yr ago), hypothyroidism presented with slurred speech and difficulty talking. Pt had elevated trops in the ED. Concern for TIA vs CVA.     #slurred speech, difficulty speaking, confusion concerning for CVA  - CT head negative  - EEG negative.  - MRI/A head & neck noted above. Concern for ?hemorrhage vs infection or inflammation. High grade focal stenosis at proximal SATISH with distal reconstitution of flow.   - Neruorsurgery consult appreciated no intervention at the time.   - Continue neuro checks  - Speech eval appreciated  - Neuro, and PT consult appreciated  - Will consider d/c to rehab once stable, spoke with daughter in regards to placement - daughter said yvonne.   - Continue to monitor    #CAD with elevated troponin  - likely from NEVA vs Demand Ischemia  - Improving  - Trop trend: 0.093 -> 0.160v-> 0.138  - serial EKG  - continue ASA, statin  - Lipid panel & A1c wnl  - Cardio consult appreciated    #aflutter  -  pt had an episode of aflutter this morning  - resolved back into sinus rhythm  - cardio consult appreciated  - CHADSVASC score of 4  - HASBLED score of 4  - discussion held with patient and family about risks > benefit on anticoagulation due to MRI findings and risk of bleeding  - pt daughter and pt both agree with no anticoagulation    #NEVA likely 2/2 dehydration  - Improving CR 1.56 to 1.36  - IV hydration and monitor renal function  - hold ARB  - Continue to monitor    #possible UTI  - F/u Urine cx  - Continue Ceftriaxone. Day 3    #CAD, carotid stenosis  - h/o severe AS and or SATISH stenosis. Pt wants conservative care  - MR neck noted high grade stenosis of SATISH.   - cont ASA, statin  - cardio and vascular surgery consult appreciated  - F/u palliative consult    #hypothyroidism  - cont synthroid    #Anemia likely dilutional  - Continue to monitor    #DVT ppx  - hep sq    Discussed with Dr. Torres 89 y/o M with PMH HTN, HLD, nonobstructive CAD, carotid stenosis 99%, skin ca s/p surgery, anxiety, BPH, prostate ca with injection (25 yr ago), hypothyroidism presented with slurred speech and difficulty talking. Pt had elevated trops in the ED. Concern for TIA vs CVA.     #slurred speech, difficulty speaking, confusion concerning for CVA  - CT head negative  - EEG negative.  - MRI/A head & neck noted above. Concern for ?hemorrhage vs infection or inflammation. High grade focal stenosis at proximal SATISH with distal reconstitution of flow.   - Neruorsurgery consult appreciated no intervention at the time.   - Continue neuro checks  - Speech eval appreciated  - Neuro, and PT consult appreciated  - Will consider d/c to rehab once stable, spoke with daughter in regards to placement - daughter said yvonne.   - Continue to monitor    #CAD with elevated troponin  - likely from NEVA vs Demand Ischemia  - Improving  - Trop trend: 0.093 -> 0.160v-> 0.138  - serial EKG  - continue ASA, statin  - Lipid panel & A1c wnl  - Cardio consult appreciated    #aflutter  -  pt had an episode of aflutter this morning  - resolved back into sinus rhythm  - cardio consult appreciated  - CHADSVASC score of 4  - HASBLED score of 4  - discussion held with patient and family about risks > benefit on anticoagulation due to MRI findings and risk of bleeding  - pt daughter and pt both agree with no anticoagulation  - f/u echocardiogram    #NEVA likely 2/2 dehydration  - Improving CR 1.56 to 1.36  - IV hydration and monitor renal function  - hold ARB  - Continue to monitor    #possible UTI  - F/u Urine cx  - Continue Ceftriaxone. Day 3    #CAD, carotid stenosis  - h/o severe AS and or SATISH stenosis. Pt wants conservative care  - MR neck noted high grade stenosis of SATISH.   - cont ASA, statin  - cardio and vascular surgery consult appreciated  - F/u palliative consult    #hypothyroidism  - cont synthroid    #Anemia likely dilutional  - Continue to monitor    #DVT ppx  - hep sq    Discussed with Dr. Torres

## 2019-01-27 LAB
ANION GAP SERPL CALC-SCNC: 6 MMOL/L — SIGNIFICANT CHANGE UP (ref 5–17)
BUN SERPL-MCNC: 28 MG/DL — HIGH (ref 7–23)
CALCIUM SERPL-MCNC: 8.3 MG/DL — LOW (ref 8.5–10.1)
CHLORIDE SERPL-SCNC: 107 MMOL/L — SIGNIFICANT CHANGE UP (ref 96–108)
CO2 SERPL-SCNC: 30 MMOL/L — SIGNIFICANT CHANGE UP (ref 22–31)
CREAT SERPL-MCNC: 1.32 MG/DL — HIGH (ref 0.5–1.3)
GLUCOSE SERPL-MCNC: 103 MG/DL — HIGH (ref 70–99)
HCT VFR BLD CALC: 28.7 % — LOW (ref 39–50)
HGB BLD-MCNC: 9.6 G/DL — LOW (ref 13–17)
MCHC RBC-ENTMCNC: 32.9 PG — SIGNIFICANT CHANGE UP (ref 27–34)
MCHC RBC-ENTMCNC: 33.4 GM/DL — SIGNIFICANT CHANGE UP (ref 32–36)
MCV RBC AUTO: 98.3 FL — SIGNIFICANT CHANGE UP (ref 80–100)
NRBC # BLD: 0 /100 WBCS — SIGNIFICANT CHANGE UP (ref 0–0)
PLATELET # BLD AUTO: 182 K/UL — SIGNIFICANT CHANGE UP (ref 150–400)
POTASSIUM SERPL-MCNC: 3.8 MMOL/L — SIGNIFICANT CHANGE UP (ref 3.5–5.3)
POTASSIUM SERPL-SCNC: 3.8 MMOL/L — SIGNIFICANT CHANGE UP (ref 3.5–5.3)
RBC # BLD: 2.92 M/UL — LOW (ref 4.2–5.8)
RBC # FLD: 14.1 % — SIGNIFICANT CHANGE UP (ref 10.3–14.5)
SODIUM SERPL-SCNC: 143 MMOL/L — SIGNIFICANT CHANGE UP (ref 135–145)
WBC # BLD: 6.46 K/UL — SIGNIFICANT CHANGE UP (ref 3.8–10.5)
WBC # FLD AUTO: 6.46 K/UL — SIGNIFICANT CHANGE UP (ref 3.8–10.5)

## 2019-01-27 RX ORDER — HYDRALAZINE HCL 50 MG
10 TABLET ORAL ONCE
Qty: 0 | Refills: 0 | Status: DISCONTINUED | OUTPATIENT
Start: 2019-01-27 | End: 2019-01-28

## 2019-01-27 RX ADMIN — CEFTRIAXONE 1000 MILLIGRAM(S): 500 INJECTION, POWDER, FOR SOLUTION INTRAMUSCULAR; INTRAVENOUS at 21:59

## 2019-01-27 RX ADMIN — Medication 25 MILLIGRAM(S): at 05:42

## 2019-01-27 RX ADMIN — Medication 81 MILLIGRAM(S): at 12:27

## 2019-01-27 RX ADMIN — FINASTERIDE 5 MILLIGRAM(S): 5 TABLET, FILM COATED ORAL at 12:28

## 2019-01-27 RX ADMIN — ATORVASTATIN CALCIUM 80 MILLIGRAM(S): 80 TABLET, FILM COATED ORAL at 21:59

## 2019-01-27 RX ADMIN — RANOLAZINE 1000 MILLIGRAM(S): 500 TABLET, FILM COATED, EXTENDED RELEASE ORAL at 05:42

## 2019-01-27 RX ADMIN — PANTOPRAZOLE SODIUM 40 MILLIGRAM(S): 20 TABLET, DELAYED RELEASE ORAL at 05:45

## 2019-01-27 RX ADMIN — Medication 1000 UNIT(S): at 12:27

## 2019-01-27 RX ADMIN — Medication 75 MICROGRAM(S): at 05:42

## 2019-01-27 RX ADMIN — Medication 0.25 MILLIGRAM(S): at 22:01

## 2019-01-27 RX ADMIN — AZELASTINE 2 SPRAY(S): 137 SPRAY, METERED NASAL at 05:43

## 2019-01-27 RX ADMIN — RANOLAZINE 1000 MILLIGRAM(S): 500 TABLET, FILM COATED, EXTENDED RELEASE ORAL at 17:55

## 2019-01-27 RX ADMIN — HEPARIN SODIUM 5000 UNIT(S): 5000 INJECTION INTRAVENOUS; SUBCUTANEOUS at 05:42

## 2019-01-27 RX ADMIN — Medication 0.25 MILLIGRAM(S): at 05:42

## 2019-01-27 RX ADMIN — Medication 25 MILLIGRAM(S): at 17:55

## 2019-01-27 RX ADMIN — SERTRALINE 100 MILLIGRAM(S): 25 TABLET, FILM COATED ORAL at 12:28

## 2019-01-27 RX ADMIN — AZELASTINE 2 SPRAY(S): 137 SPRAY, METERED NASAL at 17:58

## 2019-01-27 RX ADMIN — HEPARIN SODIUM 5000 UNIT(S): 5000 INJECTION INTRAVENOUS; SUBCUTANEOUS at 17:55

## 2019-01-27 RX ADMIN — LOTEPREDNOL ETABONATE 1 DROP(S): 2 SUSPENSION/ DROPS OPHTHALMIC at 12:29

## 2019-01-27 NOTE — PROGRESS NOTE ADULT - ASSESSMENT
89 y.o. male with PMH HTN, HLD, nonobstructive CAD, carotid stenosis 99%, skin ca s/p surgery, anxiety, BPH, prostate ca with injection (25 yr ago), hypothyroidism presents with slurred speech and difficulty talking.     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  -CT head - no hemorrhage   -Slurred speech resolved     #Carotid stenosis  -MRA neck- high grade focal stenosis of proximal R ICA   -Cardio consult appreciated    #NEVA  -possibly 2/2 dehydration  -Monitor BUN/Cr daily  -Hold Nephrotoxic agents     #CAD  -Continue ASA and statin  -echo- 1/26- severe AS    #Elevated troponin  ~likely 2/2 NEVA vs demand ischemia   -Cardio consult appreciated  -Continue ASA and statin    #Hypothyroidism  -Continue synthroid     #Anemia  -monitor H/H daily    #DVT PPx  -Hep 5000 BID SQ

## 2019-01-27 NOTE — PROGRESS NOTE ADULT - SUBJECTIVE AND OBJECTIVE BOX
HPI  18  89 y.o. male with PMH HTN, HLD, nonobstructive CAD, carotid stenosis 99%, skin ca s/p surgery, anxiety, BPH, prostate ca with injection (25 yr ago), hypothyroidism presents with slurred speech and difficulty talking. Pt's daughter Tarsha at bedside with son in law and grand daughter. Pt usually speaks with daughter on daily basis for at least 30 minutes. Today, while talking, pt appears to comprehend, but unable to speak. Pt also with slurred speech. EMS was contacted and per ED note, pt had ?seizure while on the ambulance. Pt denies fever, chills, CP, SOB, abd pain, dysuria, or diarrhea. Pt reports right sided hip pain that is not new. Currently, pt's daughter states the symptoms is almost back to normal. Per daughter, pt initially unable to recognize her and only later would he recognize her. Pt baseline walks walker/cane. Does all ADLs including driving. Pt does have frequent falls recently.     PMH: as above  PSH: skin surgery, eye lid surgery, hernia repair >30 yr ago  Social hx: denies tobacco, EtOH, drugs  Family hx: Father-throat ca, stroke,  age ~65; Mother-stomach ca,  age ~65  ROS: per HPI, poor historian      SUBJECTIVE:   18    Pt seen and evaluated today. Pt denies CP, SOB and headache. Of note, patient conversing well today with clear speech. Discussed results of echo, severe aortic stenosis with Dr. Ordoñez and patient.     REVIEW OF SYSTEMS:    CONSTITUTIONAL: No weakness, fevers or chills  RESPIRATORY: No shortness of breath  CARDIOVASCULAR: No chest pain  GASTROINTESTINAL: No abdominal or epigastric pain. No nausea, vomiting.  All other review of systems is negative unless indicated above    Vital Signs Last 24 Hrs  T(C): 36.8 (2019 05:43), Max: 36.8 (2019 05:43)  T(F): 98.3 (2019 05:43), Max: 98.3 (2019 05:43)  HR: 85 (2019 11:26) (77 - 85)  BP: 142/65 (2019 11:26) (142/65 - 168/66)  BP(mean): --  RR: 18 (2019 11:26) (18 - 18)  SpO2: 100% (2019 11:26) (100% - 100%)    I&O's Summary      CAPILLARY BLOOD GLUCOSE      PHYSICAL EXAM:    Constitutional: NAD, awake and alert  Respiratory: Breath sounds are clear bilaterally, No wheezing, rales or rhonchi  Cardiovascular: S1 and S2, regular rate and rhythm, +systolic murmur   Gastrointestinal: Bowel Sounds present, soft, nontender, nondistended, no guarding, no rebound  Extremities: No peripheral edema  Vascular: 2+ peripheral pulses  Neurological: Alert and oriented  Skin: No rashes    MEDICATIONS:  MEDICATIONS  (STANDING):  ALPRAZolam 0.25 milliGRAM(s) Oral two times a day  aspirin  chewable 81 milliGRAM(s) Oral daily  atorvastatin 80 milliGRAM(s) Oral at bedtime  azelastine 0.1% Nasal Spray 2 Spray(s) Both Nostrils two times a day  bethanechol 25 milliGRAM(s) Oral two times a day  cefTRIAXone Injectable. 1000 milliGRAM(s) IV Push every 24 hours  cefTRIAXone Injectable.      cholecalciferol 1000 Unit(s) Oral daily  finasteride 5 milliGRAM(s) Oral daily  heparin  Injectable 5000 Unit(s) SubCutaneous every 12 hours  levothyroxine 75 MICROGram(s) Oral daily  loteprednol 0.2% Ophthalmic Suspension 1 Drop(s) Both EYES daily  pantoprazole    Tablet 40 milliGRAM(s) Oral before breakfast  ranolazine 1000 milliGRAM(s) Oral two times a day  sertraline 100 milliGRAM(s) Oral daily      LABS: All Labs Reviewed:                        9.6    6.46  )-----------( 182      ( 2019 06:12 )             28.7         143  |  107  |  28<H>  ----------------------------<  103<H>  3.8   |  30  |  1.32<H>    Ca    8.3<L>      2019 06:12  Phos  2.7       Mg     1.8           Blood Culture:     RADIOLOGY/EKG:    DVT PPX: Hep 5000 SQ BID     ADVANCED DIRECTIVE: DNR    DISPOSITION: D/C planning

## 2019-01-27 NOTE — PROGRESS NOTE ADULT - ATTENDING COMMENTS
Patient seen and examined with Family Medicine Residents Tanner Vegas and Selena Ramon on the Family Medicine Teaching Service.  Agree with history, physical, labs and plan which were reviewed in detail after a face to face encounter with the patient.
on exam- comfortable, slurring speech noted   RS- aeeb, cta   CVS-s1s2 normal     #MRI study to follow    #Discussed with Dr. Douglas and Dr. Ordoñez
Patient seen and examined with Family Medicine Residents Tanner Ortiz and Richy Alanis and student Kit Gamble on the Family Medicine Teaching Service.  Agree with history, physical, labs and plan which were reviewed in detail after a face to face encounter with the patient.

## 2019-01-28 VITALS — SYSTOLIC BLOOD PRESSURE: 162 MMHG | DIASTOLIC BLOOD PRESSURE: 62 MMHG

## 2019-01-28 LAB
ANION GAP SERPL CALC-SCNC: 5 MMOL/L — SIGNIFICANT CHANGE UP (ref 5–17)
BUN SERPL-MCNC: 29 MG/DL — HIGH (ref 7–23)
CALCIUM SERPL-MCNC: 8.5 MG/DL — SIGNIFICANT CHANGE UP (ref 8.5–10.1)
CHLORIDE SERPL-SCNC: 105 MMOL/L — SIGNIFICANT CHANGE UP (ref 96–108)
CO2 SERPL-SCNC: 30 MMOL/L — SIGNIFICANT CHANGE UP (ref 22–31)
CREAT SERPL-MCNC: 1.53 MG/DL — HIGH (ref 0.5–1.3)
GLUCOSE SERPL-MCNC: 151 MG/DL — HIGH (ref 70–99)
HCT VFR BLD CALC: 31.8 % — LOW (ref 39–50)
HGB BLD-MCNC: 10.5 G/DL — LOW (ref 13–17)
MCHC RBC-ENTMCNC: 33 GM/DL — SIGNIFICANT CHANGE UP (ref 32–36)
MCHC RBC-ENTMCNC: 33.2 PG — SIGNIFICANT CHANGE UP (ref 27–34)
MCV RBC AUTO: 100.6 FL — HIGH (ref 80–100)
NRBC # BLD: 0 /100 WBCS — SIGNIFICANT CHANGE UP (ref 0–0)
PLATELET # BLD AUTO: 227 K/UL — SIGNIFICANT CHANGE UP (ref 150–400)
POTASSIUM SERPL-MCNC: 3.8 MMOL/L — SIGNIFICANT CHANGE UP (ref 3.5–5.3)
POTASSIUM SERPL-SCNC: 3.8 MMOL/L — SIGNIFICANT CHANGE UP (ref 3.5–5.3)
RBC # BLD: 3.16 M/UL — LOW (ref 4.2–5.8)
RBC # FLD: 13.9 % — SIGNIFICANT CHANGE UP (ref 10.3–14.5)
SODIUM SERPL-SCNC: 140 MMOL/L — SIGNIFICANT CHANGE UP (ref 135–145)
WBC # BLD: 6.71 K/UL — SIGNIFICANT CHANGE UP (ref 3.8–10.5)
WBC # FLD AUTO: 6.71 K/UL — SIGNIFICANT CHANGE UP (ref 3.8–10.5)

## 2019-01-28 RX ORDER — ASPIRIN/CALCIUM CARB/MAGNESIUM 324 MG
1 TABLET ORAL
Qty: 0 | Refills: 0 | COMMUNITY
Start: 2019-01-28

## 2019-01-28 RX ORDER — CLOPIDOGREL BISULFATE 75 MG/1
1 TABLET, FILM COATED ORAL
Qty: 0 | Refills: 0 | COMMUNITY
Start: 2019-01-28

## 2019-01-28 RX ORDER — ROSUVASTATIN CALCIUM 5 MG/1
1 TABLET ORAL
Qty: 0 | Refills: 0 | COMMUNITY

## 2019-01-28 RX ORDER — ASPIRIN/CALCIUM CARB/MAGNESIUM 324 MG
1 TABLET ORAL
Qty: 0 | Refills: 0 | COMMUNITY

## 2019-01-28 RX ORDER — AMLODIPINE BESYLATE 2.5 MG/1
5 TABLET ORAL ONCE
Qty: 0 | Refills: 0 | Status: COMPLETED | OUTPATIENT
Start: 2019-01-28 | End: 2019-01-28

## 2019-01-28 RX ORDER — AMLODIPINE BESYLATE 2.5 MG/1
1 TABLET ORAL
Qty: 20 | Refills: 0 | OUTPATIENT
Start: 2019-01-28 | End: 2019-02-16

## 2019-01-28 RX ORDER — CLOPIDOGREL BISULFATE 75 MG/1
75 TABLET, FILM COATED ORAL DAILY
Qty: 0 | Refills: 0 | Status: DISCONTINUED | OUTPATIENT
Start: 2019-01-28 | End: 2019-01-28

## 2019-01-28 RX ORDER — AMLODIPINE BESYLATE 2.5 MG/1
1 TABLET ORAL
Qty: 15 | Refills: 0 | OUTPATIENT
Start: 2019-01-28 | End: 2019-02-11

## 2019-01-28 RX ORDER — ALPRAZOLAM 0.25 MG
1 TABLET ORAL
Qty: 0 | Refills: 0 | COMMUNITY

## 2019-01-28 RX ORDER — METOPROLOL TARTRATE 50 MG
50 TABLET ORAL
Qty: 0 | Refills: 0 | Status: DISCONTINUED | OUTPATIENT
Start: 2019-01-28 | End: 2019-01-28

## 2019-01-28 RX ORDER — ATORVASTATIN CALCIUM 80 MG/1
1 TABLET, FILM COATED ORAL
Qty: 0 | Refills: 0 | COMMUNITY
Start: 2019-01-28

## 2019-01-28 RX ORDER — LOSARTAN POTASSIUM 100 MG/1
1 TABLET, FILM COATED ORAL
Qty: 0 | Refills: 0 | COMMUNITY

## 2019-01-28 RX ORDER — METOPROLOL TARTRATE 50 MG
1 TABLET ORAL
Qty: 0 | Refills: 0 | COMMUNITY
Start: 2019-01-28

## 2019-01-28 RX ORDER — ALPRAZOLAM 0.25 MG
1 TABLET ORAL
Qty: 0 | Refills: 0 | COMMUNITY
Start: 2019-01-28

## 2019-01-28 RX ORDER — METOPROLOL TARTRATE 50 MG
25 TABLET ORAL
Qty: 0 | Refills: 0 | Status: DISCONTINUED | OUTPATIENT
Start: 2019-01-28 | End: 2019-01-28

## 2019-01-28 RX ADMIN — AZELASTINE 2 SPRAY(S): 137 SPRAY, METERED NASAL at 05:59

## 2019-01-28 RX ADMIN — CLOPIDOGREL BISULFATE 75 MILLIGRAM(S): 75 TABLET, FILM COATED ORAL at 11:41

## 2019-01-28 RX ADMIN — Medication 75 MICROGRAM(S): at 05:54

## 2019-01-28 RX ADMIN — LOTEPREDNOL ETABONATE 1 DROP(S): 2 SUSPENSION/ DROPS OPHTHALMIC at 11:46

## 2019-01-28 RX ADMIN — Medication 0.25 MILLIGRAM(S): at 05:54

## 2019-01-28 RX ADMIN — Medication 25 MILLIGRAM(S): at 05:55

## 2019-01-28 RX ADMIN — SERTRALINE 100 MILLIGRAM(S): 25 TABLET, FILM COATED ORAL at 11:42

## 2019-01-28 RX ADMIN — PANTOPRAZOLE SODIUM 40 MILLIGRAM(S): 20 TABLET, DELAYED RELEASE ORAL at 05:56

## 2019-01-28 RX ADMIN — HEPARIN SODIUM 5000 UNIT(S): 5000 INJECTION INTRAVENOUS; SUBCUTANEOUS at 05:55

## 2019-01-28 RX ADMIN — Medication 25 MILLIGRAM(S): at 17:34

## 2019-01-28 RX ADMIN — Medication 81 MILLIGRAM(S): at 11:42

## 2019-01-28 RX ADMIN — FINASTERIDE 5 MILLIGRAM(S): 5 TABLET, FILM COATED ORAL at 11:41

## 2019-01-28 RX ADMIN — AMLODIPINE BESYLATE 5 MILLIGRAM(S): 2.5 TABLET ORAL at 17:35

## 2019-01-28 RX ADMIN — RANOLAZINE 1000 MILLIGRAM(S): 500 TABLET, FILM COATED, EXTENDED RELEASE ORAL at 05:55

## 2019-01-28 RX ADMIN — RANOLAZINE 1000 MILLIGRAM(S): 500 TABLET, FILM COATED, EXTENDED RELEASE ORAL at 17:35

## 2019-01-28 RX ADMIN — Medication 1000 UNIT(S): at 11:42

## 2019-01-28 RX ADMIN — Medication 0.25 MILLIGRAM(S): at 17:35

## 2019-01-28 RX ADMIN — Medication 25 MILLIGRAM(S): at 17:35

## 2019-01-28 RX ADMIN — AZELASTINE 2 SPRAY(S): 137 SPRAY, METERED NASAL at 17:40

## 2019-01-28 NOTE — PHARMACOTHERAPY INTERVENTION NOTE - COMMENTS
Spoke to Dr. Mckenna and recommended adding heparin 5000 units BID
Recommended discontinuing ceftriaxone for treatment of UTI after 6 days of therapy

## 2019-01-28 NOTE — CHART NOTE - NSCHARTNOTEFT_GEN_A_CORE
called by rn for patient with elevated bp prior to d/c    patient seen and evaluated bedside. patient asymptomatic no headache no chest pain no vision changes.   will give norvasc 5mg x 1. if decreases systolic <160 and diastolic <100 will d/c patient home in stable condition with advise to follow up with pcp and cardiology.    Vital Signs Last 24 Hrs  T(C): 36.2 (28 Jan 2019 10:48), Max: 36.4 (27 Jan 2019 22:09)  T(F): 97.2 (28 Jan 2019 10:48), Max: 97.6 (27 Jan 2019 22:09)  HR: 72 (28 Jan 2019 17:27) (64 - 90)  BP: 182/70 (28 Jan 2019 17:27) (139/61 - 182/70)  BP(mean): --  RR: 18 (28 Jan 2019 10:48) (18 - 18)  SpO2: 99% (28 Jan 2019 10:48) (95% - 99%)    the above was d/w patient and family    d/w RN Staff called by rn for patient with elevated bp prior to d/c    patient seen and evaluated bedside. patient asymptomatic no headache no chest pain no vision changes.   will give norvasc 5mg x 1. if decreases systolic <160 and diastolic <100 will d/c patient home in stable condition with advise to follow up with pcp and cardiology.    Vital Signs Last 24 Hrs  T(C): 36.2 (28 Jan 2019 10:48), Max: 36.4 (27 Jan 2019 22:09)  T(F): 97.2 (28 Jan 2019 10:48), Max: 97.6 (27 Jan 2019 22:09)  HR: 72 (28 Jan 2019 17:27) (64 - 90)  BP: 182/70 (28 Jan 2019 17:27) (139/61 - 182/70)  BP(mean): --  RR: 18 (28 Jan 2019 10:48) (18 - 18)  SpO2: 99% (28 Jan 2019 10:48) (95% - 99%)    the above was d/w patient and family    d/w RN Staff    patient repeat bp 162/67 manually and 174/60 electronically  changed rx for norvasc to 5mg and sent to pharmacy   advised RN to have patient follow up with pmd to monitor bp    spoke to pharmacy Sheila about change as well called by rn for patient with elevated bp prior to d/c    patient seen and evaluated bedside. patient asymptomatic no headache no chest pain no vision changes.   will give norvasc 5mg x 1. if decreases systolic <170 and diastolic <100 will d/c patient home in stable condition with advise to follow up with pcp and cardiology.    Vital Signs Last 24 Hrs  T(C): 36.2 (28 Jan 2019 10:48), Max: 36.4 (27 Jan 2019 22:09)  T(F): 97.2 (28 Jan 2019 10:48), Max: 97.6 (27 Jan 2019 22:09)  HR: 72 (28 Jan 2019 17:27) (64 - 90)  BP: 182/70 (28 Jan 2019 17:27) (139/61 - 182/70)  BP(mean): --  RR: 18 (28 Jan 2019 10:48) (18 - 18)  SpO2: 99% (28 Jan 2019 10:48) (95% - 99%)    the above was d/w patient and family    d/w RN Staff    patient repeat bp 162/67 manually and 174/60 electronically  changed rx for norvasc to 5mg and sent to pharmacy   advised RN to have patient follow up with pmd to monitor bp  d/w Dr Dea singh to d/c home as patient asymptomatic     spoke to pharmacy Sheila about change as well

## 2019-01-28 NOTE — PROGRESS NOTE ADULT - ASSESSMENT
TIA  SATISH stenosis  Severe AS  PAF/flutter  Ac on chronic kidney disease.   HTN  HLD    Suggest:    Advanced age pt with known h/o severe AS and severe SATISH stenosis. He has rrequested conservative management.  Short PAFl. Low dose beta blockers. Not a candidate for anticoagulation. He has had falls at home.  Hydrate  Follow renal function  Minimally elevated Trop-I. No chest pain. Will follow up.

## 2019-01-28 NOTE — PROGRESS NOTE ADULT - REASON FOR ADMISSION
slurred speech, difficulty talking

## 2019-01-28 NOTE — CHART NOTE - NSCHARTNOTEFT_GEN_A_CORE
spoke to Dr Lorenzo cardiology ok to d/c patient on plavix and aspirin no a/c at this time and follow up outpatient with cardiology    spoke to neurology Dr Bueno ok to d/c patient at this time follow up outpatient neurology

## 2019-01-28 NOTE — PROGRESS NOTE ADULT - SUBJECTIVE AND OBJECTIVE BOX
Pt has been seen and examined with FP resident, resident supervised agree with a/p       Patient is a 89y old  Male who presents with a chief complaint of slurred speech, difficulty talking (28 Jan 2019 10:45)        PHYSICAL EXAM:  Vital Signs Last 24 Hrs  T(C): 36.2 (28 Jan 2019 10:48), Max: 36.4 (27 Jan 2019 22:09)  T(F): 97.2 (28 Jan 2019 10:48), Max: 97.6 (27 Jan 2019 22:09)  HR: 90 (28 Jan 2019 10:48) (64 - 90)  BP: 139/61 (28 Jan 2019 10:48) (139/61 - 176/64)  BP(mean): --  RR: 18 (28 Jan 2019 10:48) (18 - 18)  SpO2: 99% (28 Jan 2019 10:48) (95% - 99%)  general- comfortable   -rs-aeeb,cta  -cvs-s1s2 normal   -p/a-soft,bs+      A/P    #d/c today, time spent 60 minutes Pt has been seen and examined with FP resident, resident supervised agree with a/p       Patient is a 89y old  Male who presents with a chief complaint of slurred speech, difficulty talking (28 Jan 2019 10:45)        PHYSICAL EXAM:  Vital Signs Last 24 Hrs  T(C): 36.2 (28 Jan 2019 10:48), Max: 36.4 (27 Jan 2019 22:09)  T(F): 97.2 (28 Jan 2019 10:48), Max: 97.6 (27 Jan 2019 22:09)  HR: 90 (28 Jan 2019 10:48) (64 - 90)  BP: 139/61 (28 Jan 2019 10:48) (139/61 - 176/64)  BP(mean): --  RR: 18 (28 Jan 2019 10:48) (18 - 18)  SpO2: 99% (28 Jan 2019 10:48) (95% - 99%)  general- comfortable   -rs-aeeb,cta  -cvs-s1s2 normal   -p/a-soft,bs+      A/P    #d/c today, time spent 60 minutes     #stop losartan and d/c on amlodipine -2.5 mg a day with further monitoring and management as an out pt

## 2019-01-28 NOTE — PROGRESS NOTE ADULT - SUBJECTIVE AND OBJECTIVE BOX
HPI  18  89 y.o. male with PMH HTN, HLD, nonobstructive CAD, carotid stenosis 99%, skin ca s/p surgery, anxiety, BPH, prostate ca with injection (25 yr ago), hypothyroidism presents with slurred speech and difficulty talking. Pt's daughter Tarsha at bedside with son in law and grand daughter. Pt usually speaks with daughter on daily basis for at least 30 minutes. Today, while talking, pt appears to comprehend, but unable to speak. Pt also with slurred speech. EMS was contacted and per ED note, pt had ?seizure while on the ambulance. Pt denies fever, chills, CP, SOB, abd pain, dysuria, or diarrhea. Pt reports right sided hip pain that is not new. Currently, pt's daughter states the symptoms is almost back to normal. Per daughter, pt initially unable to recognize her and only later would he recognize her. Pt baseline walks walker/cane. Does all ADLs including driving. Pt does have frequent falls recently.     PMH: as above  PSH: skin surgery, eye lid surgery, hernia repair >30 yr ago  Social hx: denies tobacco, EtOH, drugs  Family hx: Father-throat ca, stroke,  age ~65; Mother-stomach ca,  age ~65  ROS: per HPI, poor historian      SUBJECTIVE:   18    Pt seen and evaluated today. Pt denies CP, SOB and headache. Of note, patient conversing well today with clear speech. Discussed results of echo, severe aortic stenosis with Dr. Ordoñez and patient.     --------------  19  Pt seen today sitting in chair. Denies any issues today. States he tries to avoid laying in bed for too long. Discussed plan for discharge today with patient.    REVIEW OF SYSTEMS:    CONSTITUTIONAL: No weakness, fevers or chills, no headache  RESPIRATORY: No shortness of breath, no cough   CARDIOVASCULAR: No chest pain  GASTROINTESTINAL: No abdominal or epigastric pain. No nausea, vomiting.  EXTREMITIES: No lower extremity pain   All other review of systems is negative unless indicated above    Vital Signs Last 24 Hrs  T(C): 36.4 (2019 04:49), Max: 36.4 (2019 22:09)  T(F): 97.6 (2019 04:49), Max: 97.6 (2019 22:09)  HR: 64 (2019 04:49) (64 - 85)  BP: 166/57 (2019 04:49) (142/65 - 176/64)  BP(mean): --  RR: 18 (2019 04:49) (18 - 18)  SpO2: 99% (2019 04:49) (95% - 100%)  I&O's Summary      CAPILLARY BLOOD GLUCOSE      PHYSICAL EXAM:    Constitutional: NAD, awake and alert  Respiratory: Breath sounds are clear bilaterally, No wheezing, rales or rhonchi  Cardiovascular: S1 and S2, regular rate and rhythm, +systolic murmur   Gastrointestinal: Bowel Sounds present, soft, nontender, nondistended, no guarding, no rebound  Extremities: No peripheral edema  Vascular: 2+ peripheral pulses  Neurological: Alert and oriented  Skin: No rashes    MEDICATIONS  (STANDING):  ALPRAZolam 0.25 milliGRAM(s) Oral two times a day  aspirin  chewable 81 milliGRAM(s) Oral daily  atorvastatin 80 milliGRAM(s) Oral at bedtime  azelastine 0.1% Nasal Spray 2 Spray(s) Both Nostrils two times a day  bethanechol 25 milliGRAM(s) Oral two times a day  cefTRIAXone Injectable. 1000 milliGRAM(s) IV Push every 24 hours  cefTRIAXone Injectable.      cholecalciferol 1000 Unit(s) Oral daily  clopidogrel Tablet 75 milliGRAM(s) Oral daily  finasteride 5 milliGRAM(s) Oral daily  heparin  Injectable 5000 Unit(s) SubCutaneous every 12 hours  hydrALAZINE Injectable 10 milliGRAM(s) IV Push once  levothyroxine 75 MICROGram(s) Oral daily  loteprednol 0.2% Ophthalmic Suspension 1 Drop(s) Both EYES daily  metoprolol tartrate 25 milliGRAM(s) Oral two times a day  pantoprazole    Tablet 40 milliGRAM(s) Oral before breakfast  ranolazine 1000 milliGRAM(s) Oral two times a day  sertraline 100 milliGRAM(s) Oral daily    MEDICATIONS  (PRN):  ALPRAZolam 0.25 milliGRAM(s) Oral daily PRN for anxiety      LABS: All Labs Reviewed:                                   9.6    6.46  )-----------( 182      ( 2019 06:12 )             28.7       BMP PENDING for       Blood Culture:     RADIOLOGY/EKG:  MR HEAD NO CONT   19      IMPRESSION:    MRI brain: Diffuse FLAIR hyperintense signal throughout the sulci of the   bilateral cerebri and a tiny subdural collection overlying the right   occipital lobe, nonspecific, may reflect hemorrhage or cellular material   from infection or inflammation. There are bilateral whole hemispheric   subdural fluid collections measuring up to 0.9 cm overlying the bilateral   frontal lobes. No significant mass effect or midline shift.    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 There is a normal congenital variant anastomosis from the   cavernous segment of the left internal carotid artery to the basilar   artery that remains widely patent.     MRA neck: High-grade focal stenosis of the proximal right internal   carotid artery with distal reconstitution of flow.       DVT PPX: Hep 5000 SQ BID     ADVANCED DIRECTIVE: DNR    DISPOSITION: D/C planning

## 2019-01-28 NOTE — PROGRESS NOTE ADULT - SUBJECTIVE AND OBJECTIVE BOX
HPI  Patient is 89-year-old with history of hypertension, high cholesterol, mild-to-moderate mitral regurgitation, moderate aortic stenosis, severe carotid artery disease of right ICA, he was admitted with complains of slurred speech and after admission was diagnosed to have TIA. Patient now is alert and oriented. Because of his age he has refused any kind of invasive intervention. He denies any chest pain or shortness of breath on telemetry was noted to have a brief episode of atrial flutter 2 days ago. Currently he is normal sinus rhythm and he is hemodynamically stable. His blood pressure is elevated.      PAST MEDICAL & SURGICAL HISTORY:  Skin cancer  SATISH Carotid stenosis  Severe AS  HTN (hypertension)  HLD  CKD    MEDICATIONS  (STANDING):  ALPRAZolam 0.25 milliGRAM(s) Oral two times a day  aspirin  chewable 81 milliGRAM(s) Oral daily  atorvastatin 80 milliGRAM(s) Oral at bedtime  azelastine 0.1% Nasal Spray 2 Spray(s) Both Nostrils two times a day  bethanechol 25 milliGRAM(s) Oral two times a day  cefTRIAXone Injectable. 1000 milliGRAM(s) IV Push every 24 hours  cefTRIAXone Injectable.      cholecalciferol 1000 Unit(s) Oral daily  finasteride 5 milliGRAM(s) Oral daily  heparin  Injectable 5000 Unit(s) SubCutaneous every 12 hours  hydrALAZINE Injectable 10 milliGRAM(s) IV Push once  levothyroxine 75 MICROGram(s) Oral daily  loteprednol 0.2% Ophthalmic Suspension 1 Drop(s) Both EYES daily  metoprolol tartrate 50 milliGRAM(s) Oral two times a day  pantoprazole    Tablet 40 milliGRAM(s) Oral before breakfast  ranolazine 1000 milliGRAM(s) Oral two times a day  sertraline 100 milliGRAM(s) Oral daily    MEDICATIONS  (PRN):  ALPRAZolam 0.25 milliGRAM(s) Oral daily PRN for anxiety        REVIEW OF SYSTEM:  Pertinent items are noted in HPI.  Constitutional	Negative for chills, fevers, sweats.    Eyes: 	Negative for visual disturbance.  Ears, nose, mouth, throat, and face: Negative for epistaxis .  Neck:	Negative for enlargement, pain and difficulty in swallowing  Respiration : Negative for cough, dyspnea on exertion, pleuritic chest pain and wheezing  Cardiovascular: Negative for chest pain, dyspnea and palpitations    Gastrointestinal : Negative for abdominal pain, diarrhea, nausea and vomiting  Genitourinary: Negative for dysuria, frequency and urinary incontinence .  Skin: Negative for  rash, pruritus, swelling, dryness .  	  Hematologic/lymphatic: Negative for bleeding and easy bruising  Musculoskeletal: Negative for arthralgias, back pain and muscle weakness.  Neurological: Negative for dizziness, headaches, seizures and tremors    Allergic/Immunologic:	Negative for anaphylaxis, angioedema and urticaria.      Vital Signs Last 24 Hrs  T(C): 36.4 (28 Jan 2019 04:49), Max: 36.4 (27 Jan 2019 22:09)  T(F): 97.6 (28 Jan 2019 04:49), Max: 97.6 (27 Jan 2019 22:09)  HR: 64 (28 Jan 2019 04:49) (64 - 85)  BP: 166/57 (28 Jan 2019 04:49) (142/65 - 176/64)  BP(mean): --  RR: 18 (28 Jan 2019 04:49) (18 - 18)  SpO2: 99% (28 Jan 2019 04:49) (95% - 100%)    I&O's Summary    27 Jan 2019 07:01  -  28 Jan 2019 07:00  --------------------------------------------------------  IN: 0 mL / OUT: 200 mL / NET: -200 mL      PHYSICAL EXAM  General Appearance: cooperative, no acute distress, he is Elderly and  frail.  HEENT: PERRL, conjunctiva clear, EOM's intact .  Neck: Supple, , no adenopathy, thyroid: not enlarged, no carotid bruit or JVD  Back: Symmetric, no  tenderness,no soft tissue tenderness  Lungs: Clear to auscultation bilateral,no adventitious breath sounds, normal   expiratory phase  Heart: Regular rate and rhythm, S1, S2 normal, 3/6 ESM murmur, no rub or gallop  Abdomen: Soft, non-tender, bowel sounds active , no hepatosplenomegaly  Extremities: no cyanosis or edema, no joint swelling  Skin: Skin color, texture normal, no rashes   Neurologic: Alert and oriented X3 , cranial nerves intact, sensory and motor normal,        INTERPRETATION OF TELEMETRY: NSR No further A Flutter            LABS:                          9.6    6.46  )-----------( 182      ( 27 Jan 2019 06:12 )             28.7     01-27    143  |  107  |  28<H>  ----------------------------<  103<H>  3.8   |  30  |  1.32<H>    Ca    8.3<L>      27 Jan 2019 06:12

## 2019-02-05 DIAGNOSIS — F41.9 ANXIETY DISORDER, UNSPECIFIED: ICD-10-CM

## 2019-02-05 DIAGNOSIS — N40.0 BENIGN PROSTATIC HYPERPLASIA WITHOUT LOWER URINARY TRACT SYMPTOMS: ICD-10-CM

## 2019-02-05 DIAGNOSIS — I62.03 NONTRAUMATIC CHRONIC SUBDURAL HEMORRHAGE: ICD-10-CM

## 2019-02-05 DIAGNOSIS — D18.1 LYMPHANGIOMA, ANY SITE: ICD-10-CM

## 2019-02-05 DIAGNOSIS — I25.10 ATHEROSCLEROTIC HEART DISEASE OF NATIVE CORONARY ARTERY WITHOUT ANGINA PECTORIS: ICD-10-CM

## 2019-02-05 DIAGNOSIS — E03.9 HYPOTHYROIDISM, UNSPECIFIED: ICD-10-CM

## 2019-02-05 DIAGNOSIS — D64.9 ANEMIA, UNSPECIFIED: ICD-10-CM

## 2019-02-05 DIAGNOSIS — Z85.46 PERSONAL HISTORY OF MALIGNANT NEOPLASM OF PROSTATE: ICD-10-CM

## 2019-02-05 DIAGNOSIS — N17.9 ACUTE KIDNEY FAILURE, UNSPECIFIED: ICD-10-CM

## 2019-02-05 DIAGNOSIS — I65.21 OCCLUSION AND STENOSIS OF RIGHT CAROTID ARTERY: ICD-10-CM

## 2019-02-05 DIAGNOSIS — I35.0 NONRHEUMATIC AORTIC (VALVE) STENOSIS: ICD-10-CM

## 2019-02-05 DIAGNOSIS — Z79.899 OTHER LONG TERM (CURRENT) DRUG THERAPY: ICD-10-CM

## 2019-02-05 DIAGNOSIS — E78.5 HYPERLIPIDEMIA, UNSPECIFIED: ICD-10-CM

## 2019-02-05 DIAGNOSIS — Z79.82 LONG TERM (CURRENT) USE OF ASPIRIN: ICD-10-CM

## 2019-02-05 DIAGNOSIS — I10 ESSENTIAL (PRIMARY) HYPERTENSION: ICD-10-CM

## 2019-02-05 DIAGNOSIS — G92 TOXIC ENCEPHALOPATHY: ICD-10-CM

## 2019-02-05 DIAGNOSIS — R47.81 SLURRED SPEECH: ICD-10-CM

## 2019-02-05 DIAGNOSIS — E86.0 DEHYDRATION: ICD-10-CM

## 2019-02-11 ENCOUNTER — EMERGENCY (EMERGENCY)
Facility: HOSPITAL | Age: 84
LOS: 0 days | Discharge: HOME | End: 2019-02-11
Attending: EMERGENCY MEDICINE | Admitting: EMERGENCY MEDICINE
Payer: MEDICARE

## 2019-02-11 VITALS
DIASTOLIC BLOOD PRESSURE: 63 MMHG | HEART RATE: 81 BPM | RESPIRATION RATE: 18 BRPM | TEMPERATURE: 99 F | SYSTOLIC BLOOD PRESSURE: 113 MMHG

## 2019-02-11 VITALS
SYSTOLIC BLOOD PRESSURE: 134 MMHG | WEIGHT: 139.99 LBS | DIASTOLIC BLOOD PRESSURE: 48 MMHG | RESPIRATION RATE: 20 BRPM | HEART RATE: 60 BPM | OXYGEN SATURATION: 100 % | TEMPERATURE: 97 F | HEIGHT: 65 IN

## 2019-02-11 DIAGNOSIS — Z79.82 LONG TERM (CURRENT) USE OF ASPIRIN: ICD-10-CM

## 2019-02-11 DIAGNOSIS — S09.90XA UNSPECIFIED INJURY OF HEAD, INITIAL ENCOUNTER: ICD-10-CM
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
## 2019-02-11 DIAGNOSIS — M47.812 SPONDYLOSIS WITHOUT MYELOPATHY OR RADICULOPATHY, CERVICAL REGION: ICD-10-CM

## 2019-02-11 DIAGNOSIS — S06.6X0A TRAUMATIC SUBARACHNOID HEMORRHAGE WITHOUT LOSS OF CONSCIOUSNESS, INITIAL ENCOUNTER: ICD-10-CM

## 2019-02-11 DIAGNOSIS — S01.01XA LACERATION WITHOUT FOREIGN BODY OF SCALP, INITIAL ENCOUNTER: ICD-10-CM
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
## 2019-02-11 PROBLEM — C44.90 UNSPECIFIED MALIGNANT NEOPLASM OF SKIN, UNSPECIFIED: Chronic | Status: ACTIVE | Noted: 2019-01-23

## 2019-02-11 PROBLEM — I10 ESSENTIAL (PRIMARY) HYPERTENSION: Chronic | Status: ACTIVE | Noted: 2019-01-23

## 2019-02-11 PROBLEM — I65.29 OCCLUSION AND STENOSIS OF UNSPECIFIED CAROTID ARTERY: Chronic | Status: ACTIVE | Noted: 2019-01-23

## 2019-02-11 LAB
ALBUMIN SERPL ELPH-MCNC: 2.7 G/DL — LOW (ref 3.3–5)
ALP SERPL-CCNC: 84 U/L — SIGNIFICANT CHANGE UP (ref 40–120)
ALT FLD-CCNC: 31 U/L — SIGNIFICANT CHANGE UP (ref 12–78)
ANION GAP SERPL CALC-SCNC: 8 MMOL/L — SIGNIFICANT CHANGE UP (ref 5–17)
APTT BLD: 30 SEC — SIGNIFICANT CHANGE UP (ref 27.5–36.3)
AST SERPL-CCNC: 39 U/L — HIGH (ref 15–37)
BASOPHILS # BLD AUTO: 0.02 K/UL — SIGNIFICANT CHANGE UP (ref 0–0.2)
BASOPHILS NFR BLD AUTO: 0.2 % — SIGNIFICANT CHANGE UP (ref 0–2)
BILIRUB SERPL-MCNC: 0.4 MG/DL — SIGNIFICANT CHANGE UP (ref 0.2–1.2)
BLD GP AB SCN SERPL QL: SIGNIFICANT CHANGE UP
BUN SERPL-MCNC: 38 MG/DL — HIGH (ref 7–23)
CALCIUM SERPL-MCNC: 8.1 MG/DL — LOW (ref 8.5–10.1)
CHLORIDE SERPL-SCNC: 101 MMOL/L — SIGNIFICANT CHANGE UP (ref 96–108)
CO2 SERPL-SCNC: 27 MMOL/L — SIGNIFICANT CHANGE UP (ref 22–31)
CREAT SERPL-MCNC: 1.6 MG/DL — HIGH (ref 0.5–1.3)
EOSINOPHIL # BLD AUTO: 0.07 K/UL — SIGNIFICANT CHANGE UP (ref 0–0.5)
EOSINOPHIL NFR BLD AUTO: 0.8 % — SIGNIFICANT CHANGE UP (ref 0–6)
GLUCOSE SERPL-MCNC: 108 MG/DL — HIGH (ref 70–99)
HCT VFR BLD CALC: 24.9 % — LOW (ref 39–50)
HGB BLD-MCNC: 8.3 G/DL — LOW (ref 13–17)
IMM GRANULOCYTES NFR BLD AUTO: 1.1 % — SIGNIFICANT CHANGE UP (ref 0–1.5)
INR BLD: 1.03 RATIO — SIGNIFICANT CHANGE UP (ref 0.88–1.16)
LYMPHOCYTES # BLD AUTO: 0.59 K/UL — LOW (ref 1–3.3)
LYMPHOCYTES # BLD AUTO: 6.6 % — LOW (ref 13–44)
MCHC RBC-ENTMCNC: 33.3 GM/DL — SIGNIFICANT CHANGE UP (ref 32–36)
MCHC RBC-ENTMCNC: 33.5 PG — SIGNIFICANT CHANGE UP (ref 27–34)
MCV RBC AUTO: 100.4 FL — HIGH (ref 80–100)
MONOCYTES # BLD AUTO: 0.66 K/UL — SIGNIFICANT CHANGE UP (ref 0–0.9)
MONOCYTES NFR BLD AUTO: 7.4 % — SIGNIFICANT CHANGE UP (ref 2–14)
NEUTROPHILS # BLD AUTO: 7.51 K/UL — HIGH (ref 1.8–7.4)
NEUTROPHILS NFR BLD AUTO: 83.9 % — HIGH (ref 43–77)
NRBC # BLD: 0 /100 WBCS — SIGNIFICANT CHANGE UP (ref 0–0)
PLATELET # BLD AUTO: 210 K/UL — SIGNIFICANT CHANGE UP (ref 150–400)
POTASSIUM SERPL-MCNC: 4.8 MMOL/L — SIGNIFICANT CHANGE UP (ref 3.5–5.3)
POTASSIUM SERPL-SCNC: 4.8 MMOL/L — SIGNIFICANT CHANGE UP (ref 3.5–5.3)
PROT SERPL-MCNC: 6 GM/DL — SIGNIFICANT CHANGE UP (ref 6–8.3)
PROTHROM AB SERPL-ACNC: 11.4 SEC — SIGNIFICANT CHANGE UP (ref 10–12.9)
RBC # BLD: 2.48 M/UL — LOW (ref 4.2–5.8)
RBC # FLD: 14.2 % — SIGNIFICANT CHANGE UP (ref 10.3–14.5)
SODIUM SERPL-SCNC: 136 MMOL/L — SIGNIFICANT CHANGE UP (ref 135–145)
TYPE + AB SCN PNL BLD: SIGNIFICANT CHANGE UP
WBC # BLD: 8.95 K/UL — SIGNIFICANT CHANGE UP (ref 3.8–10.5)
WBC # FLD AUTO: 8.95 K/UL — SIGNIFICANT CHANGE UP (ref 3.8–10.5)

## 2019-02-11 PROCEDURE — 93010 ELECTROCARDIOGRAM REPORT: CPT

## 2019-02-11 PROCEDURE — 72170 X-RAY EXAM OF PELVIS: CPT | Mod: 26

## 2019-02-11 PROCEDURE — 70450 CT HEAD/BRAIN W/O DYE: CPT | Mod: 26

## 2019-02-11 PROCEDURE — 72125 CT NECK SPINE W/O DYE: CPT | Mod: 26

## 2019-02-11 PROCEDURE — 99285 EMERGENCY DEPT VISIT HI MDM: CPT | Mod: 25

## 2019-02-11 PROCEDURE — 71250 CT THORAX DX C-: CPT | Mod: 26

## 2019-02-11 PROCEDURE — 12001 RPR S/N/AX/GEN/TRNK 2.5CM/<: CPT

## 2019-02-11 PROCEDURE — 70450 CT HEAD/BRAIN W/O DYE: CPT | Mod: 26,77

## 2019-02-11 PROCEDURE — 71045 X-RAY EXAM CHEST 1 VIEW: CPT | Mod: 26

## 2019-02-11 PROCEDURE — 74176 CT ABD & PELVIS W/O CONTRAST: CPT | Mod: 26

## 2019-02-11 RX ORDER — ACETAMINOPHEN 500 MG
1000 TABLET ORAL ONCE
Qty: 0 | Refills: 0 | Status: COMPLETED | OUTPATIENT
Start: 2019-02-11 | End: 2019-02-11

## 2019-02-11 RX ADMIN — Medication 1000 MILLIGRAM(S): at 04:25

## 2019-02-11 RX ADMIN — Medication 400 MILLIGRAM(S): at 03:57

## 2019-02-11 RX ADMIN — Medication 400 MILLIGRAM(S): at 15:31

## 2019-02-11 NOTE — ED ADULT TRIAGE NOTE - CHIEF COMPLAINT QUOTE
pt coming from [facility] s/p mechanical trip and fall on way to bathroom. denies LOC. takes aspirin and plavix daily. bleeding from back of head controlled by EMS. alert and oriented x 3. trauma alert called 0145.

## 2019-02-11 NOTE — ED PROVIDER NOTE - OBJECTIVE STATEMENT
89 yo male s/p fall.  Pt got up to use the bathroom and fell.  Denies syncope.  +scalp laceration.  denies headache.

## 2019-02-11 NOTE — CONSULT NOTE ADULT - SUBJECTIVE AND OBJECTIVE BOX
Patient is a 89y old  Male who presents with a chief complaint of     HPI: 89 yo male seen and examined in the ER. Pt presented after sustaining a fall this evening. Pt lives at an assisted living where he got up to go to the bathroom and fell. Pt states he remembers the fall unknown LOC, pt c/o pain to the back of his head and his left hip.   Head CT shows-     PAST MEDICAL & SURGICAL HISTORY:  Skin cancer  Carotid stenosis  HTN (hypertension)      FAMILY HISTORY:      Social Hx:  Nonsmoker, no drug or alcohol use    MEDICATIONS  (STANDING):  acetaminophen  IVPB .. 1000 milliGRAM(s) IV Intermittent Once       Allergies    No Known Allergies    Intolerances        ROS: Pertinent positives in HPI, all other ROS were reviewed and are negative.      Vital Signs Last 24 Hrs  T(C): 36.3 (11 Feb 2019 01:54), Max: 36.3 (11 Feb 2019 01:54)  T(F): 97.4 (11 Feb 2019 01:54), Max: 97.4 (11 Feb 2019 01:54)  HR: 60 (11 Feb 2019 01:54) (60 - 60)  BP: 134/48 (11 Feb 2019 01:54) (134/48 - 134/48)  BP(mean): --  RR: 20 (11 Feb 2019 01:54) (20 - 20)  SpO2: 100% (11 Feb 2019 01:54) (100% - 100%)        Constitutional: awake and alert.  HEENT: + laceration to occiput s/p staples, PERRLA, EOMI, injected conjuntiva  Neck: Supple. no pain to palp or rom   Gastrointestinal: soft, nontender  Extremities:  + tenderness to palp left hip and left lower extremity, + pain w/ ROM left leg   Musculoskeletal: no joint swelling/tenderness, no abnormal movements  Skin: + multiple areas of ecchymosis and skin tears noted to left arm     Neurological exam:  HF: A x O x 3. Appropriately interactive, normal affect. Speech fluent, No Aphasia or paraphasic errors. Naming /repetition intact   CN: OLGA, EOMI, VFF, facial sensation normal,   Motor: No pronator drift, Strength 5/5 in all 4 ext with limit due to pain in LLE   Sens: Intact to light touch  Gait/Balance: Cannot test    Labs:   02-11    136  |  101  |  38<H>  ----------------------------<  108<H>  4.8   |  27  |  1.60<H>    Ca    8.1<L>      11 Feb 2019 02:02    TPro  6.0  /  Alb  2.7<L>  /  TBili  0.4  /  DBili  x   /  AST  39<H>  /  ALT  31  /  AlkPhos  84  02-11 01-24 Chol 140 LDL 54 HDL 75 Trig 55  01-24 [id number] 5.3                          8.3    8.95  )-----------( 210      ( 11 Feb 2019 02:02 )             24.9       Radiology:  - CT Head:  MPRESSION:  Head CT: Left parietal occipital scalp swelling and hematoma. No   calvarial fracture. Trace left occipital subarachnoid hemorrhage and tiny   subdural suspected. Persistent, although decrease, high attenuation along   the left tentorial leaflet. Stable bilateral chronic subdural hygromas.   No midline shift.    Cervical spine CT: No acute cervical spine fracture or evidence of   traumatic malalignment. Cervical spondylosis as above.    I discussed the findings with Dr. Richards at 2:55 AM on 2/11/2019 with read   back verification.      KRISTINE AUGUSTINE M.D.; ATTENDING RADIOLOGIST  This document has been electronically signed. Feb 11 2019  3:02AM

## 2019-02-11 NOTE — CONSULT NOTE ADULT - ASSESSMENT
89 yo male s/p mechanical fall with head trauma unknown LOC with stable bilateral chronic SDH, and trace possible acute SDH and SAH   - pt currently on ASA no need for plt at this time   - rpt head CT in 6 hours or sooner for any changes in MS   - Hip pain to be evaluated by ER, xrays ordered   - no acute neurosurgical intervention at this current time 89 yo male s/p mechanical fall with head trauma unknown LOC with stable bilateral chronic SDH, and trace possible acute SDH and SAH   - pt currently on ASA no need for plt at this time   - rpt head CT in 6 hours or sooner for any changes in MS   - Hip pain to be evaluated by ER, xrays ordered   - no acute neurosurgical intervention at this current time   - all above d/w Dr Mejia

## 2019-02-11 NOTE — ED ADULT NURSE NOTE - CHPI ED NUR SYMPTOMS NEG
no confusion/no deformity/no fever/no tingling/no loss of consciousness/no weakness/no numbness/no vomiting

## 2019-02-11 NOTE — CONSULT NOTE ADULT - CONSULT REQUESTED DATE/TIME
Was the patient seen in the last year in this department? No     Does patient have an active prescription for medications requested? No     Received Request Via: Patient   11-Feb-2019 03:41

## 2019-02-11 NOTE — ED ADULT NURSE NOTE - NSFALLRSKHRMRISKTYPE_ED_ALL_ED
age(85 years old or older)/bones(Osteoporosis,prev fx,steroid use,metastatic bone ca)/coagulation(Bleeding disorder R/T clinical cond/anti-coags)

## 2019-02-11 NOTE — ED ADULT NURSE REASSESSMENT NOTE - NS ED NURSE REASSESS COMMENT FT1
Pt sent for 3rd Ct scan , awaiting results, IV tylenol given
patient resting in bed on monitor. sleeping intermittently, arousable to voice. family to go home and return. awaiting repeat CT scan at around 8am, ( 6 hours after first). will continue to monitor.     contact information: keira (daughter)-- cell: [phone] . home: [phone].
Awaiting ambulance back to [facility]

## 2019-02-11 NOTE — PROGRESS NOTE ADULT - SUBJECTIVE AND OBJECTIVE BOX
Neurosurgery PA Follow-up Note:    Pt seen and examined in the ED  Repeat CT done     CT Head No Cont (02.11.19 @ 08:53)  IMPRESSION:  Since head CT performed earlier on the same day, 2/11/19 at 2:31 AM:  Slight increase in density of posterior left occipitotemporal lobe subdural hematoma, although unchanged in thickness measuring 0.5 cm. This may be secondary to technique.  Unchanged small amount of left occipital acute subarachnoid hemorrhage. Redemonstration of prominence of diffuse bilateral cerebral/cerebellar subdural spaces, moderately increase since head CT of 1/23/19, for example prior left frontal subdural space measured up to 1.0 cm, now it measures up to 1.5 cm. No midline shift.    Pt remains neurologically unchanged, moving all extremities antigravity, awake, alert, and oriented X2-- thought he was at the [facility] but was quickly re-oriented and remembered the events of last night.     PLAN-  Will hold the patient in the ED for another 6 hours and repeat the CT   Continue to hold aspirin for one week   Will follow up     Discussed with Dr. Mejia

## 2019-02-11 NOTE — ED ADULT NURSE NOTE - NSIMPLEMENTINTERV_GEN_ALL_ED
Implemented All Fall with Harm Risk Interventions:  [place] to call system. Call bell, personal items and telephone within reach. Instruct patient to call for assistance. Room bathroom lighting operational. Non-slip footwear when patient is off stretcher. Physically safe environment: no spills, clutter or unnecessary equipment. Stretcher in lowest position, wheels locked, appropriate side rails in place. Provide visual cue, wrist band, yellow gown, etc. Monitor gait and stability. Monitor for mental status changes and reorient to person, place, and time. Review medications for side effects contributing to fall risk. Reinforce activity limits and safety measures with patient and family. Provide visual clues: red socks.

## 2019-02-11 NOTE — ED PROVIDER NOTE - SKIN, MLM
2cm laceration right parietal occipital scalp with hematoma; skin tear left forearm; abrasion left elbow

## 2019-02-11 NOTE — ED ADULT NURSE NOTE - CHIEF COMPLAINT QUOTE
pt coming from [facility] s/p mechanical trip and fall on way to bathroom. denies LOC. takes aspirin and plavix daily. bleeding from back of head controlled by EMS. alert and oriented x 3. trauma alert called 0145.

## 2019-02-11 NOTE — ED PROVIDER NOTE - PROGRESS NOTE DETAILS
d/w Neurosurgery, Corrina, will see pt d/w Neurosurgery, Corrina JUNIOR, will see pt Repeat CT reviewed by NS -- cleared for discharge. Repeat CT reviewed by NS -- increased density, same size.  Will require add'l repeat ~3pm Norm Bella: Spoke with neuro who says CT of brain is stable and pt is free to be discharged.

## 2019-02-11 NOTE — PROGRESS NOTE ADULT - SUBJECTIVE AND OBJECTIVE BOX
CT Head No Cont (02.11.19 @ 15:48) >  No interval change since head CT performed earlier on the same day,   2/11/19 at 9:14 AM:    Unchanged posterior left occipitotemporal lobe subdural hematoma   measuring 0.5 cm. in greatest thickness.    Unchanged small amount of acute left occipital subarachnoid hemorrhage.    Redemonstration of prominence of diffuse bilateral cerebral/cerebellar   subdural spaces, moderately increase since head CT of 1/23/19, for   example, the prior left frontal subdural space measured up to 1.0 cm, now   it measures up to 1.5 cm. in greatest thickness.  No midline shift.      -Stable for discharge from neurosurgical standpoint  -F/u CT head in 2 weeks, sooner if any change neurologically    Discussed with Dr Mejia

## 2019-02-11 NOTE — ED PROVIDER NOTE - NS_ ATTENDINGSCRIBEDETAILS _ED_A_ED_FT
I, Apollo Bella MD,  performed the initial face to face bedside interview with this patient regarding history of present illness, review of symptoms and relevant past medical, social and family history.  I completed an independent physical examination.    The history, relevant review of systems, past medical and surgical history, medical decision making, and physical examination was documented by the scribe in my presence and I attest to the accuracy of the documentation.

## 2019-02-11 NOTE — ED ADULT NURSE NOTE - OBJECTIVE STATEMENT
89-year-old male with a past medical history of hypertension, hyperlipidemia, CAD, PADDY on Aspirin and Plavix presenting to the ED via EMS triage for a fall from standing height. Spoke with NH nurse Cowan for additional history. NH states that the patient was found lying on the floor, on his left side, bleeding from the back of his head (at 0100). Patient is on anticoagulation with Aspirin and Plavix. Patient is baseline A+O, at his baseline mentation. No LOC as per NH. No recent changes in status or illness over past week as per NH. Patient states he was getting up to go to the bathroom and had a mechanical fall due to slipping on his socks.  In ED patient has no complaints stating he feels well. Laceration to posterior scalp noted with associated hematoma and left elbow skin tear.   Trauma alert called. Please see trauma alert flow sheet for additional charting.   Negative: Syncope/LOC, fevers, chills, headache, dizziness, weakness, lethargy, vision changes, hearing changes, focal/lateralizing neurologic deficits, throat pain, chest pain, palpitations, shortness of breath, wheezing, abdominal pain, nausea, vomiting, constipation, diarrhea, urinary signs/symptoms, or edema.   Upon physical examination patient is A+Ox4/GCS 15 and conversive. PERRLA. S1S2 heard. Lung sounds clear. Abdomen soft/non-tender. FROM/CMS throughout all extremities.

## 2019-02-12 DIAGNOSIS — I62.03 NONTRAUMATIC CHRONIC SUBDURAL HEMORRHAGE: ICD-10-CM

## 2019-02-12 DIAGNOSIS — S06.6X9A TRAUMATIC SUBARACHNOID HEMORRHAGE WITH LOSS OF CONSCIOUSNESS OF UNSPECIFIED DURATION, INITIAL ENCOUNTER: ICD-10-CM

## 2019-02-12 DIAGNOSIS — I10 ESSENTIAL (PRIMARY) HYPERTENSION: ICD-10-CM

## 2019-02-13 ENCOUNTER — INPATIENT (INPATIENT)
Facility: HOSPITAL | Age: 84
LOS: 4 days | End: 2019-02-18
Attending: INTERNAL MEDICINE | Admitting: INTERNAL MEDICINE
Payer: MEDICARE

## 2019-02-13 VITALS
OXYGEN SATURATION: 81 % | HEIGHT: 66 IN | RESPIRATION RATE: 14 BRPM | HEART RATE: 65 BPM | DIASTOLIC BLOOD PRESSURE: 49 MMHG | WEIGHT: 119.93 LBS | SYSTOLIC BLOOD PRESSURE: 108 MMHG

## 2019-02-13 DIAGNOSIS — N18.3 CHRONIC KIDNEY DISEASE, STAGE 3 (MODERATE): ICD-10-CM

## 2019-02-13 DIAGNOSIS — S06.6X9A TRAUMATIC SUBARACHNOID HEMORRHAGE WITH LOSS OF CONSCIOUSNESS OF UNSPECIFIED DURATION, INITIAL ENCOUNTER: ICD-10-CM

## 2019-02-13 DIAGNOSIS — N40.0 BENIGN PROSTATIC HYPERPLASIA WITHOUT LOWER URINARY TRACT SYMPTOMS: ICD-10-CM

## 2019-02-13 DIAGNOSIS — E11.22 TYPE 2 DIABETES MELLITUS WITH DIABETIC CHRONIC KIDNEY DISEASE: ICD-10-CM

## 2019-02-13 DIAGNOSIS — D64.9 ANEMIA, UNSPECIFIED: ICD-10-CM

## 2019-02-13 DIAGNOSIS — N17.9 ACUTE KIDNEY FAILURE, UNSPECIFIED: ICD-10-CM

## 2019-02-13 DIAGNOSIS — J96.21 ACUTE AND CHRONIC RESPIRATORY FAILURE WITH HYPOXIA: ICD-10-CM

## 2019-02-13 DIAGNOSIS — S06.5X9A TRAUMATIC SUBDURAL HEMORRHAGE WITH LOSS OF CONSCIOUSNESS OF UNSPECIFIED DURATION, INITIAL ENCOUNTER: ICD-10-CM
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
## 2019-02-13 DIAGNOSIS — I24.8 OTHER FORMS OF ACUTE ISCHEMIC HEART DISEASE: ICD-10-CM

## 2019-02-13 DIAGNOSIS — I50.33 ACUTE ON CHRONIC DIASTOLIC (CONGESTIVE) HEART FAILURE: ICD-10-CM
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
## 2019-02-13 DIAGNOSIS — G92 TOXIC ENCEPHALOPATHY: ICD-10-CM

## 2019-02-13 DIAGNOSIS — Z66 DO NOT RESUSCITATE: ICD-10-CM

## 2019-02-13 DIAGNOSIS — I13.0 HYPERTENSIVE HEART AND CHRONIC KIDNEY DISEASE WITH HEART FAILURE AND STAGE 1 THROUGH STAGE 4 CHRONIC KIDNEY DISEASE, OR UNSPECIFIED CHRONIC KIDNEY DISEASE: ICD-10-CM

## 2019-02-13 LAB
ABO RH CONFIRMATION: SIGNIFICANT CHANGE UP
ALBUMIN SERPL ELPH-MCNC: 2.6 G/DL — LOW (ref 3.3–5)
ALP SERPL-CCNC: 81 U/L — SIGNIFICANT CHANGE UP (ref 40–120)
ALT FLD-CCNC: 33 U/L — SIGNIFICANT CHANGE UP (ref 12–78)
ANION GAP SERPL CALC-SCNC: 5 MMOL/L — SIGNIFICANT CHANGE UP (ref 5–17)
APPEARANCE UR: CLEAR — SIGNIFICANT CHANGE UP
APTT BLD: 29.1 SEC — SIGNIFICANT CHANGE UP (ref 27.5–36.3)
AST SERPL-CCNC: 52 U/L — HIGH (ref 15–37)
BACTERIA # UR AUTO: NEGATIVE — SIGNIFICANT CHANGE UP
BILIRUB SERPL-MCNC: 0.8 MG/DL — SIGNIFICANT CHANGE UP (ref 0.2–1.2)
BILIRUB UR-MCNC: NEGATIVE — SIGNIFICANT CHANGE UP
BLD GP AB SCN SERPL QL: SIGNIFICANT CHANGE UP
BUN SERPL-MCNC: 38 MG/DL — HIGH (ref 7–23)
CALCIUM SERPL-MCNC: 8.1 MG/DL — LOW (ref 8.5–10.1)
CHLORIDE SERPL-SCNC: 106 MMOL/L — SIGNIFICANT CHANGE UP (ref 96–108)
CO2 SERPL-SCNC: 28 MMOL/L — SIGNIFICANT CHANGE UP (ref 22–31)
COLOR SPEC: YELLOW — SIGNIFICANT CHANGE UP
CREAT SERPL-MCNC: 1.59 MG/DL — HIGH (ref 0.5–1.3)
DIFF PNL FLD: ABNORMAL
EPI CELLS # UR: NEGATIVE — SIGNIFICANT CHANGE UP
GLUCOSE SERPL-MCNC: 127 MG/DL — HIGH (ref 70–99)
GLUCOSE UR QL: NEGATIVE MG/DL — SIGNIFICANT CHANGE UP
HCT VFR BLD CALC: 21.8 % — LOW (ref 39–50)
HCT VFR BLD CALC: 28.4 % — LOW (ref 39–50)
HGB BLD-MCNC: 7.2 G/DL — LOW (ref 13–17)
HGB BLD-MCNC: 9.5 G/DL — LOW (ref 13–17)
INR BLD: 1.07 RATIO — SIGNIFICANT CHANGE UP (ref 0.88–1.16)
KETONES UR-MCNC: NEGATIVE — SIGNIFICANT CHANGE UP
LEUKOCYTE ESTERASE UR-ACNC: ABNORMAL
MCHC RBC-ENTMCNC: 31.8 PG — SIGNIFICANT CHANGE UP (ref 27–34)
MCHC RBC-ENTMCNC: 33 GM/DL — SIGNIFICANT CHANGE UP (ref 32–36)
MCHC RBC-ENTMCNC: 33.3 PG — SIGNIFICANT CHANGE UP (ref 27–34)
MCHC RBC-ENTMCNC: 33.5 GM/DL — SIGNIFICANT CHANGE UP (ref 32–36)
MCV RBC AUTO: 100.9 FL — HIGH (ref 80–100)
MCV RBC AUTO: 95 FL — SIGNIFICANT CHANGE UP (ref 80–100)
NITRITE UR-MCNC: NEGATIVE — SIGNIFICANT CHANGE UP
NRBC # BLD: 0 /100 WBCS — SIGNIFICANT CHANGE UP (ref 0–0)
NRBC # BLD: 0 /100 WBCS — SIGNIFICANT CHANGE UP (ref 0–0)
PH UR: 5 — SIGNIFICANT CHANGE UP (ref 5–8)
PLATELET # BLD AUTO: 153 K/UL — SIGNIFICANT CHANGE UP (ref 150–400)
PLATELET # BLD AUTO: 159 K/UL — SIGNIFICANT CHANGE UP (ref 150–400)
POTASSIUM SERPL-MCNC: 4.2 MMOL/L — SIGNIFICANT CHANGE UP (ref 3.5–5.3)
POTASSIUM SERPL-SCNC: 4.2 MMOL/L — SIGNIFICANT CHANGE UP (ref 3.5–5.3)
PROT SERPL-MCNC: 5.7 GM/DL — LOW (ref 6–8.3)
PROT UR-MCNC: 30 MG/DL
PROTHROM AB SERPL-ACNC: 11.9 SEC — SIGNIFICANT CHANGE UP (ref 10–12.9)
RBC # BLD: 2.16 M/UL — LOW (ref 4.2–5.8)
RBC # BLD: 2.99 M/UL — LOW (ref 4.2–5.8)
RBC # FLD: 14.6 % — HIGH (ref 10.3–14.5)
RBC # FLD: 16.7 % — HIGH (ref 10.3–14.5)
RBC CASTS # UR COMP ASSIST: NEGATIVE /HPF — SIGNIFICANT CHANGE UP (ref 0–4)
SODIUM SERPL-SCNC: 139 MMOL/L — SIGNIFICANT CHANGE UP (ref 135–145)
SP GR SPEC: 1.01 — SIGNIFICANT CHANGE UP (ref 1.01–1.02)
TROPONIN I SERPL-MCNC: 0.18 NG/ML — HIGH (ref 0.01–0.04)
TROPONIN I SERPL-MCNC: 0.57 NG/ML — HIGH (ref 0.01–0.04)
TYPE + AB SCN PNL BLD: SIGNIFICANT CHANGE UP
UROBILINOGEN FLD QL: NEGATIVE MG/DL — SIGNIFICANT CHANGE UP
WBC # BLD: 8.93 K/UL — SIGNIFICANT CHANGE UP (ref 3.8–10.5)
WBC # BLD: 9.31 K/UL — SIGNIFICANT CHANGE UP (ref 3.8–10.5)
WBC # FLD AUTO: 8.93 K/UL — SIGNIFICANT CHANGE UP (ref 3.8–10.5)
WBC # FLD AUTO: 9.31 K/UL — SIGNIFICANT CHANGE UP (ref 3.8–10.5)
WBC UR QL: SIGNIFICANT CHANGE UP

## 2019-02-13 PROCEDURE — 99231 SBSQ HOSP IP/OBS SF/LOW 25: CPT

## 2019-02-13 PROCEDURE — 70551 MRI BRAIN STEM W/O DYE: CPT | Mod: 26

## 2019-02-13 PROCEDURE — 70450 CT HEAD/BRAIN W/O DYE: CPT | Mod: 26

## 2019-02-13 PROCEDURE — 93010 ELECTROCARDIOGRAM REPORT: CPT

## 2019-02-13 PROCEDURE — 71250 CT THORAX DX C-: CPT | Mod: 26

## 2019-02-13 PROCEDURE — 99285 EMERGENCY DEPT VISIT HI MDM: CPT

## 2019-02-13 PROCEDURE — 74176 CT ABD & PELVIS W/O CONTRAST: CPT | Mod: 26

## 2019-02-13 RX ORDER — RANOLAZINE 500 MG/1
1000 TABLET, FILM COATED, EXTENDED RELEASE ORAL
Qty: 0 | Refills: 0 | Status: DISCONTINUED | OUTPATIENT
Start: 2019-02-13 | End: 2019-02-18

## 2019-02-13 RX ORDER — AMLODIPINE BESYLATE 2.5 MG/1
5 TABLET ORAL DAILY
Qty: 0 | Refills: 0 | Status: DISCONTINUED | OUTPATIENT
Start: 2019-02-13 | End: 2019-02-18

## 2019-02-13 RX ORDER — ATORVASTATIN CALCIUM 80 MG/1
80 TABLET, FILM COATED ORAL AT BEDTIME
Qty: 0 | Refills: 0 | Status: DISCONTINUED | OUTPATIENT
Start: 2019-02-13 | End: 2019-02-18

## 2019-02-13 RX ORDER — BETHANECHOL CHLORIDE 25 MG
25 TABLET ORAL
Qty: 0 | Refills: 0 | Status: DISCONTINUED | OUTPATIENT
Start: 2019-02-13 | End: 2019-02-18

## 2019-02-13 RX ORDER — CHOLECALCIFEROL (VITAMIN D3) 125 MCG
1000 CAPSULE ORAL DAILY
Qty: 0 | Refills: 0 | Status: DISCONTINUED | OUTPATIENT
Start: 2019-02-13 | End: 2019-02-18

## 2019-02-13 RX ORDER — PANTOPRAZOLE SODIUM 20 MG/1
40 TABLET, DELAYED RELEASE ORAL
Qty: 0 | Refills: 0 | Status: DISCONTINUED | OUTPATIENT
Start: 2019-02-13 | End: 2019-02-18

## 2019-02-13 RX ORDER — LEVOTHYROXINE SODIUM 125 MCG
75 TABLET ORAL DAILY
Qty: 0 | Refills: 0 | Status: DISCONTINUED | OUTPATIENT
Start: 2019-02-13 | End: 2019-02-18

## 2019-02-13 RX ORDER — HEPARIN SODIUM 5000 [USP'U]/ML
5000 INJECTION INTRAVENOUS; SUBCUTANEOUS EVERY 12 HOURS
Qty: 0 | Refills: 0 | Status: DISCONTINUED | OUTPATIENT
Start: 2019-02-13 | End: 2019-02-13

## 2019-02-13 RX ORDER — FUROSEMIDE 40 MG
40 TABLET ORAL ONCE
Qty: 0 | Refills: 0 | Status: COMPLETED | OUTPATIENT
Start: 2019-02-13 | End: 2019-02-13

## 2019-02-13 RX ORDER — SERTRALINE 25 MG/1
100 TABLET, FILM COATED ORAL DAILY
Qty: 0 | Refills: 0 | Status: DISCONTINUED | OUTPATIENT
Start: 2019-02-13 | End: 2019-02-18

## 2019-02-13 RX ORDER — METOPROLOL TARTRATE 50 MG
25 TABLET ORAL
Qty: 0 | Refills: 0 | Status: DISCONTINUED | OUTPATIENT
Start: 2019-02-13 | End: 2019-02-18

## 2019-02-13 RX ORDER — ACETAMINOPHEN 500 MG
650 TABLET ORAL EVERY 6 HOURS
Qty: 0 | Refills: 0 | Status: DISCONTINUED | OUTPATIENT
Start: 2019-02-13 | End: 2019-02-18

## 2019-02-13 RX ORDER — PREGABALIN 225 MG/1
1000 CAPSULE ORAL DAILY
Qty: 0 | Refills: 0 | Status: DISCONTINUED | OUTPATIENT
Start: 2019-02-13 | End: 2019-02-18

## 2019-02-13 RX ORDER — ERYTHROMYCIN BASE 5 MG/GRAM
1 OINTMENT (GRAM) OPHTHALMIC (EYE)
Qty: 0 | Refills: 0 | Status: DISCONTINUED | OUTPATIENT
Start: 2019-02-13 | End: 2019-02-18

## 2019-02-13 RX ORDER — FINASTERIDE 5 MG/1
5 TABLET, FILM COATED ORAL DAILY
Qty: 0 | Refills: 0 | Status: DISCONTINUED | OUTPATIENT
Start: 2019-02-13 | End: 2019-02-18

## 2019-02-13 RX ADMIN — Medication 1 APPLICATION(S): at 17:37

## 2019-02-13 RX ADMIN — Medication 25 MILLIGRAM(S): at 17:36

## 2019-02-13 RX ADMIN — RANOLAZINE 1000 MILLIGRAM(S): 500 TABLET, FILM COATED, EXTENDED RELEASE ORAL at 17:36

## 2019-02-13 RX ADMIN — ATORVASTATIN CALCIUM 80 MILLIGRAM(S): 80 TABLET, FILM COATED ORAL at 21:15

## 2019-02-13 RX ADMIN — Medication 40 MILLIGRAM(S): at 17:37

## 2019-02-13 RX ADMIN — SERTRALINE 100 MILLIGRAM(S): 25 TABLET, FILM COATED ORAL at 20:01

## 2019-02-13 RX ADMIN — FINASTERIDE 5 MILLIGRAM(S): 5 TABLET, FILM COATED ORAL at 17:36

## 2019-02-13 RX ADMIN — Medication 1000 UNIT(S): at 17:37

## 2019-02-13 RX ADMIN — Medication 30 MILLIGRAM(S): at 20:01

## 2019-02-13 NOTE — H&P ADULT - NSHPPHYSICALEXAM_GEN_ALL_CORE
Vital Signs Last 24 Hrs  T(C): 36.4 (13 Feb 2019 08:58), Max: 37.2 (13 Feb 2019 00:59)  T(F): 97.6 (13 Feb 2019 08:58), Max: 99 (13 Feb 2019 00:59)  HR: 76 (13 Feb 2019 08:58) (63 - 76)  BP: 162/85 (13 Feb 2019 08:58) (108/49 - 162/85)  BP(mean): --  RR: 22 (13 Feb 2019 08:58) (14 - 22)  SpO2: 96% (13 Feb 2019 08:58) (81% - 99%)    PE:  Constitutional: NAD, laying in bed  HEENT: NC/AT  Back: no tenderness  Respiratory: respirations even and non labored, LCTA  Cardiovascular: S1S2 regular, no murmurs  Abdomen: soft, not tender, not distended, positive BS  Genitourinary: voiding  Rectal: deferred  Musculoskeletal: no muscle tenderness, no joint swelling or tenderness  Extremities: no pedal edema   Neurological: no focal deficits

## 2019-02-13 NOTE — PROGRESS NOTE ADULT - SUBJECTIVE AND OBJECTIVE BOX
Neurosurgery Evaluation:    89M with prior hx of Acute UTI end of January 2019, with change in MS / activity.  Pt treated in hospital and released to rehab facility.  Pt while at rehab sustained fall with Lac to head on 2/11.  Pt returned to ED on 2/11 s/p fall with head lac and small left occipital Traum SAH / [facility] SDH.  Minor increase in chronic collections compared to January 2019 study is appreciated - but remains non-surgical.  On 2/13 just after Midnight, Pt BIBA from [facility] with VS concerns / MS changes.   Repeat CTH in ED demonstrates no significant change.  Pt with drop in H/H and Pulse ox below  90% re: vital sign evaluation in ED.  Prior CXR shows ? atelect vs possible infiltrate.  D/w ER Attending - work up in progress.    PmHx    PsHx    Meds    ROS: as per HPI    Soc Hx: independent prior to UTI event.  Sharp mentally and functional on his own.  Rapid decline with UTI    Vital Signs Last 24 Hrs  T(C): 37.2 (13 Feb 2019 00:59), Max: 37.2 (13 Feb 2019 00:59)  T(F): 99 (13 Feb 2019 00:59), Max: 99 (13 Feb 2019 00:59)  HR: 65 (13 Feb 2019 04:08) (63 - 65)  BP: 120/73 (13 Feb 2019 04:08) (108/49 - 120/73)  BP(mean): --  RR: 20 (13 Feb 2019 04:08) (14 - 22)  SpO2: 93% (13 Feb 2019 04:08) (81% - 93%)    Labs:                        7.2    9.31  )-----------( 159      ( 13 Feb 2019 01:20 )             21.8     02-13    139  |  106  |  38<H>  ----------------------------<  127<H>  4.2   |  28  |  1.59<H>    Ca    8.1<L>      13 Feb 2019 01:20    TPro  5.7<L>  /  Alb  2.6<L>  /  TBili  0.8  /  DBili  x   /  AST  52<H>  /  ALT  33  /  AlkPhos  81  02-13        PT/INR - ( 13 Feb 2019 01:20 )   PT: 11.9 sec;   INR: 1.07 ratio    PTT - ( 13 Feb 2019 01:20 )  PTT:29.1 sec    Radiology report:  CTH Report:  IMPRESSION: Small volume acute left parieto-occipital subarachnoid   hemorrhage.  Bilateral holohemispheric low-density subdural collections may represent   subdural hygromas versus chronic subdural hematomas.  Findings discussed with Dr. Arias by Dr. Stephens on 2/13/2019 at   3:10 AM  with read back.      Physical Exam:  Constitutional: Sleepy / arousable to light stim / voice  GCS: E3V5M6  HEENT: Severe cataracts, EOMI noted looking for daughter.  Neck: Supple  Respiratory: CTA b/l  Cardiovascular: S1 and S2, regular rhythm  Gastrointestinal: Soft, NT/ND  Extremities:  no edema  Musculoskeletal: no joint swelling/tenderness, no abnormal movements  Skin: No rashes. Lac noted. No active heme.    Neurological Exam:  HF: Adentulous, GCS of 15. States name / asks what time is it.  Follows commands showing both thumbs when asked.  Motor: No pronator drift, Strength 4+/5 equal throughout all extremeties  Sens: Intact to light touch  Reflexes: Symmetric and normal, downgoing toes b/l  Gait/Balance: Cannot test    A/P: 89M with change in vital signs / suspect tox/metabolic encephalopathy / low H/H.  CTH is stable regarding SD collection size.  Non surgical findings.    - Medical w/u in progress  - CT a/p with decreasing H/H pending  - Neuro checks q 4 hour / Vitals q 4 hour  - No antiszr meds required  - sbp < 160  - Consider consolidation on CXR / ? infiltrate  - d/w Dr. Mejia pt's status and imaging findings - No neurosurgical intervention at this time   - Will re-evaluate later this am as routine f/u Neurosurgery Evaluation:    89M with prior hx of Acute UTI end of January 2019, with change in MS / activity.  Pt treated in hospital and released to rehab facility.  Pt while at rehab sustained fall with Lac to head on 2/11.  Pt returned to ED on 2/11 s/p fall with head lac and small left occipital Traum SAH / [facility] SDH.  Minor increase in chronic collections compared to January 2019 study is appreciated - but remains non-surgical.  On 2/13 just after Midnight, Pt BIBA from [facility] with VS concerns / MS changes.   Repeat CTH in ED demonstrates no significant change.  Pt with drop in H/H and Pulse ox below  90% re: vital sign evaluation in ED.  Prior CXR shows ? atelect vs possible infiltrate.  D/w ER Attending - work up in progress.    PmHx  SD collection - Chronic  Skin cancer  Carotid stenosis  HTN (hypertension)    Meds:   ASA  Plavix  Xanax      ROS: as per HPI    Soc Hx: independent prior to UTI event.  Sharp mentally and functional on his own.  Rapid decline with UTI    Vital Signs Last 24 Hrs  T(C): 37.2 (13 Feb 2019 00:59), Max: 37.2 (13 Feb 2019 00:59)  T(F): 99 (13 Feb 2019 00:59), Max: 99 (13 Feb 2019 00:59)  HR: 65 (13 Feb 2019 04:08) (63 - 65)  BP: 120/73 (13 Feb 2019 04:08) (108/49 - 120/73)  BP(mean): --  RR: 20 (13 Feb 2019 04:08) (14 - 22)  SpO2: 93% (13 Feb 2019 04:08) (81% - 93%)    Labs:                        7.2    9.31  )-----------( 159      ( 13 Feb 2019 01:20 )             21.8     02-13    139  |  106  |  38<H>  ----------------------------<  127<H>  4.2   |  28  |  1.59<H>    Ca    8.1<L>      13 Feb 2019 01:20    TPro  5.7<L>  /  Alb  2.6<L>  /  TBili  0.8  /  DBili  x   /  AST  52<H>  /  ALT  33  /  AlkPhos  81  02-13        PT/INR - ( 13 Feb 2019 01:20 )   PT: 11.9 sec;   INR: 1.07 ratio    PTT - ( 13 Feb 2019 01:20 )  PTT:29.1 sec    Radiology report:  CTH Report:  IMPRESSION: Small volume acute left parieto-occipital subarachnoid   hemorrhage.  Bilateral holohemispheric low-density subdural collections may represent   subdural hygromas versus chronic subdural hematomas.  Findings discussed with Dr. Arias by Dr. Stephens on 2/13/2019 at   3:10 AM  with read back.      Physical Exam:  Constitutional: Sleepy / arousable to light stim / voice  GCS: E3V5M6  HEENT: Severe cataracts, EOMI noted looking for daughter.  Neck: Supple  Respiratory: CTA b/l  Cardiovascular: S1 and S2, regular rhythm  Gastrointestinal: Soft, NT/ND  Extremities:  no edema  Musculoskeletal: no joint swelling/tenderness, no abnormal movements  Skin: No rashes. Lac noted. No active heme.    Neurological Exam:  HF: Adentulous, GCS of 15. States name / asks what time is it.  Follows commands showing both thumbs when asked.  Motor: No pronator drift, Strength 4+/5 equal throughout all extremeties  Sens: Intact to light touch  Reflexes: Symmetric and normal, downgoing toes b/l  Gait/Balance: Cannot test    A/P: 89M with change in vital signs / suspect tox/metabolic encephalopathy / low H/H.  CTH is stable regarding SD collection size.  Non surgical findings.    - Medical w/u in progress  - CT a/p with decreasing H/H pending  - Neuro checks q 4 hour / Vitals q 4 hour  - No antiszr meds required  - sbp < 160  - Consider consolidation on CXR / ? infiltrate  - d/w Dr. Mejia pt's status and imaging findings - No neurosurgical intervention at this time   - Will re-evaluate later this am as routine f/u Neurosurgery Evaluation:    89M with prior hx HTN, Carotid Stenosis, Skin CA, Known SD collection, Admitted recently w/ acute UTI, end of January 2019, with change in MS / activity levels.  Pt treated in hospital and released to rehab facility.  Pt while at rehab sustained fall with Lac to head on 2/11.  Pt returned to ED on 2/11 s/p fall with head lac and small left occipital Traum SAH / [facility] SDH.  CTH 2/11 demonstrated minor increase in chronic collections compared to January 2019 study- but remains non-surgical.  On 2/13 just after Midnight, Pt BIBA from [facility] with VS concerns / MS changes.   Repeat CTH in ED demonstrates no significant change.  Pt with drop in H/H and Pulse ox below  90% re: vital sign evaluation in ED.  Prior CXR / CT chest 2/11 shows ? atelect vs possible infiltrate R lung field.  D/w ER Attending - work up in progress.    PmHx  SD collection - Chronic  Skin cancer  Carotid stenosis  HTN (hypertension)    Meds: (Outpt list From last admin on 2/11)  ASA 81  Plavix  Xanax  Azelastin 137mcg nasal  Setraline 100mg  Alrex Opth  Ranexa 1000 BID  Synthroid 75 mcg  Finasteride 5mg  Protonix 40mg  Bethanecol 25  Cholecalciferol 1000 qd  Cyanobalamin 1000 mg  Atovarstatin 80mg  Metoprolol 25mg BID  Amlodopine 5mg    ROS: as per HPI.  Family at bedside providing pt's med history / recent events.    Soc Hx: independent prior to UTI event.  Sharp mentally and functional on his own.  Rapid decline with UTI    Vital Signs Last 24 Hrs  T(C): 37.2 (13 Feb 2019 00:59), Max: 37.2 (13 Feb 2019 00:59)  T(F): 99 (13 Feb 2019 00:59), Max: 99 (13 Feb 2019 00:59)  HR: 65 (13 Feb 2019 04:08) (63 - 65)  BP: 120/73 (13 Feb 2019 04:08) (108/49 - 120/73)  BP(mean): --  RR: 20 (13 Feb 2019 04:08) (14 - 22)  SpO2: 93% (13 Feb 2019 04:08) (81% - 93%)    Labs:                        7.2    9.31  )-----------( 159      ( 13 Feb 2019 01:20 )             21.8     02-13    139  |  106  |  38<H>  ----------------------------<  127<H>  4.2   |  28  |  1.59<H>    Ca    8.1<L>      13 Feb 2019 01:20    TPro  5.7<L>  /  Alb  2.6<L>  /  TBili  0.8  /  DBili  x   /  AST  52<H>  /  ALT  33  /  AlkPhos  81  02-13        PT/INR - ( 13 Feb 2019 01:20 )   PT: 11.9 sec;   INR: 1.07 ratio    PTT - ( 13 Feb 2019 01:20 )  PTT:29.1 sec    Radiology report:  CTH Report:  IMPRESSION: Small volume acute left parieto-occipital subarachnoid   hemorrhage.  Bilateral holohemispheric low-density subdural collections may represent   subdural hygromas versus chronic subdural hematomas.  Findings discussed with Dr. Arias by Dr. Stephens on 2/13/2019 at   3:10 AM  with read back.      Physical Exam:  Constitutional: Sleepy / arousable to light stim / voice  GCS: E3V5M6  HEENT: Severe cataracts, EOMI noted looking for daughter.  Neck: Supple  Respiratory: CTA b/l  Cardiovascular: S1 and S2, regular rhythm  Gastrointestinal: Soft, NT/ND  Extremities:  no edema  Musculoskeletal: no joint swelling/tenderness, no abnormal movements  Skin: No rashes. Lac noted. No active heme.    Neurological Exam:  HF: Adentulous, GCS of 15. States name / asks what time is it.  Follows commands showing both thumbs when asked.  Motor: No pronator drift, Strength 4+/5 equal throughout all extremeties  Sens: Intact to light touch  Reflexes: Symmetric and normal, downgoing toes b/l  Gait/Balance: Cannot test    A/P: 89M with change in vital signs / suspect tox/metabolic encephalopathy / low H/H.  CTH is stable regarding SD collection size.  Non surgical findings.    - Medical w/u in progress  - CT a/p with decreasing H/H pending  - Neuro checks q 4 hour / Vitals q 4 hour  - No antiszr meds required  - Hold ASA / Plavix for now  - sbp < 160  - Consider consolidation on CXR / ? infiltrate  - d/w Dr. Mejia pt's status and imaging findings - No neurosurgical intervention at this time   - Will re-evaluate later this am as routine f/u Neurosurgery Evaluation:    89M with prior hx HTN, Carotid Stenosis, Skin CA, Known SD collection, Admitted recently w/ acute UTI, end of January 2019, with change in MS / activity levels.  Pt treated in hospital and released to rehab facility.  Pt while at rehab sustained fall with Lac to head on 2/11.  Pt returned to ED on 2/11 s/p fall with head lac and small left occipital Traum SAH / [facility] SDH.  CTH 2/11 demonstrated minor increase in chronic collections compared to January 2019 study- but remains non-surgical.  On 2/13 just after Midnight, Pt BIBA from [facility] with VS concerns / MS changes.   Repeat CTH in ED demonstrates no significant change.  Pt with drop in H/H and Pulse ox below  90% re: vital sign evaluation in ED.  Prior CXR / CT chest 2/11 shows ? atelect vs possible infiltrate R lung field.  D/w ER Attending - work up in progress.    PmHx  SD collection - Chronic  BPH  CRI (Chronic Renal Insufficiency)  Skin cancer  Carotid stenosis  HTN (hypertension)    Meds: (Outpt list From last admin on 2/11)  ASA 81  Plavix  Xanax  Azelastin 137mcg nasal  Setraline 100mg  Alrex Opth  Ranexa 1000 BID  Synthroid 75 mcg  Finasteride 5mg  Protonix 40mg  Bethanecol 25  Cholecalciferol 1000 qd  Cyanobalamin 1000 mg  Atovarstatin 80mg  Metoprolol 25mg BID  Amlodopine 5mg    ROS: as per HPI.  Family at bedside providing pt's med history / recent events.    Soc Hx: independent prior to UTI event.  Sharp mentally and functional on his own.  Rapid decline with UTI    Vital Signs Last 24 Hrs  T(C): 37.2 (13 Feb 2019 00:59), Max: 37.2 (13 Feb 2019 00:59)  T(F): 99 (13 Feb 2019 00:59), Max: 99 (13 Feb 2019 00:59)  HR: 65 (13 Feb 2019 04:08) (63 - 65)  BP: 120/73 (13 Feb 2019 04:08) (108/49 - 120/73)  BP(mean): --  RR: 20 (13 Feb 2019 04:08) (14 - 22)  SpO2: 93% (13 Feb 2019 04:08) (81% - 93%)    Labs:                        7.2    9.31  )-----------( 159      ( 13 Feb 2019 01:20 )             21.8     02-13    139  |  106  |  38<H>  ----------------------------<  127<H>  4.2   |  28  |  1.59<H>    Ca    8.1<L>      13 Feb 2019 01:20    TPro  5.7<L>  /  Alb  2.6<L>  /  TBili  0.8  /  DBili  x   /  AST  52<H>  /  ALT  33  /  AlkPhos  81  02-13        PT/INR - ( 13 Feb 2019 01:20 )   PT: 11.9 sec;   INR: 1.07 ratio    PTT - ( 13 Feb 2019 01:20 )  PTT:29.1 sec    Radiology report:  CTH Report:  IMPRESSION: Small volume acute left parieto-occipital subarachnoid   hemorrhage.  Bilateral holohemispheric low-density subdural collections may represent   subdural hygromas versus chronic subdural hematomas.  Findings discussed with Dr. Arias by Dr. Stephens on 2/13/2019 at   3:10 AM  with read back.      Physical Exam:  Constitutional: Sleepy / arousable to light stim / voice  GCS: E3V5M6  HEENT: Severe cataracts, EOMI noted looking for daughter.  Neck: Supple  Respiratory: CTA b/l  Cardiovascular: S1 and S2, regular rhythm  Gastrointestinal: Soft, NT/ND  Extremities:  no edema  Musculoskeletal: no joint swelling/tenderness, no abnormal movements  Skin: No rashes. Lac noted. No active heme.    Neurological Exam:  HF: Adentulous, GCS of 15. States name / asks what time is it.  Follows commands showing both thumbs when asked.  Motor: No pronator drift, Strength 4+/5 equal throughout all extremeties  Sens: Intact to light touch  Reflexes: Symmetric and normal, downgoing toes b/l  Gait/Balance: Cannot test    A/P: 89M with change in vital signs / suspect tox/metabolic encephalopathy / low H/H.  CTH is stable regarding SD collection size.  Non surgical findings.    - Medical w/u in progress  - CT a/p with decreasing H/H pending  - Neuro checks q 4 hour / Vitals q 4 hour  - No antiszr meds required  - Hold ASA / Plavix for now  - sbp < 160  - Consider consolidation on CXR / ? infiltrate  - d/w Dr. Mejia pt's status and imaging findings - No neurosurgical intervention at this time   - Will re-evaluate later this am as routine f/u

## 2019-02-13 NOTE — H&P ADULT - HISTORY OF PRESENT ILLNESS
89 year old man with history of Anemia, BPH, CRI and HTN who presented to the ED from [facility] for altered mental status. As per report patient fell on 2/11, was brought to the ED - he had a CT scan which showed a small SAH. He was discharged from the ED back to [facility] as there was no acute neurosurgical intervention was done. He returns today accompanied by his daughter with complaints of AMS. Repeat HEad CT demonstrates no significant changes. Further workup showed ANemia H/H 7.8/21.8- CT chest showed Moderate CHF with mild interstitial/minimal alveolar pulmonary edema and small-moderate left and small right pleural effusions. Moderate bibasilar passive atelectasis. Patience received 1 U PRBC in the ED.     PSH- unable to be obtained- due to mental status  FMH- unable to be obtained due to mental status. 89 year old man with history of Anemia, BPH, CRI and HTN who presented to the ED from [facility] for altered mental status. As per report patient fell on 2/11, was brought to the ED - he had a CT scan which showed a small SAH. He was discharged from the ED back to [facility] as there was no acute neurosurgical intervention was done. He returns today accompanied by his daughter with complaints of AMS. Repeat HEad CT demonstrates no significant changes. Further workup showed ANemia H/H 7.8/21.8- CT chest showed Moderate CHF with mild interstitial/minimal alveolar pulmonary edema and small-moderate left and small right pleural effusions. Moderate bibasilar passive atelectasis. Patience received 1 U PRBC in the ED.     PMH- kidney failure, anemia, acute ischemic heart disease, BPH, Anxiety DO, HTN, HLD, dysphagia  PSH- unable to be obtained- due to mental status  FMH- unable to be obtained due to mental status. 89 year old man with history of Anemia, BPH, CKD stage III (baseline 1.5 in January) and HTN, diastolic CHF, mod to severe AS, DM who presented to the ED from [facility] for altered mental status. As per report patient fell on 2/11, was brought to the ED - he had a CT scan which showed a small SAH. He was discharged from the ED back to [facility] as there was no acute neurosurgical intervention was done. He returns today accompanied by his daughter with complaints of AMS. Repeat HEad CT demonstrates no significant changes. Further workup showed ANemia H/H 7.8/21.8 (s/p 1 unit PRBC)- CT chest showed Moderate CHF with mild interstitial/minimal alveolar pulmonary edema and small-moderate left and small right pleural effusions. Moderate bibasilar passive atelectasis. Patience received 1 U PRBC in the ED.   daughter was notified and states that since monday, he has been more confused. Per [facility] documentation, patient was able to walk unassisted and was alert and oriented. Made call out to Dr Gamboa-> awaiting call back    PMH- kidney failure, anemia, acute ischemic heart disease, BPH, Anxiety DO, HTN, HLD, dysphagia  PSH- unable to be obtained- due to mental status  FMH- unable to be obtained due to mental status.

## 2019-02-13 NOTE — ED PROVIDER NOTE - MUSCULOSKELETAL, MLM
Spine appears normal, range of motion is not limited, no muscle or joint tenderness. left trochanter with ecchymosis, and stage 2 ulcer. left arm with scabbed skin tears florencia ctive bleeding

## 2019-02-13 NOTE — INPATIENT CERTIFICATION FOR MEDICARE PATIENTS - RISKS OF ADVERSE EVENTS
Concern for delay in diagnosis and treatment/Concern for worsening infectious process/Concern for neurologic deterioration/Concern for renal deterioration

## 2019-02-13 NOTE — DIETITIAN INITIAL EVALUATION ADULT. - PERTINENT MEDS FT
MEDICATIONS  (STANDING):  amLODIPine   Tablet 5 milliGRAM(s) Oral daily  atorvastatin 80 milliGRAM(s) Oral at bedtime  bethanechol 25 milliGRAM(s) Oral two times a day  cholecalciferol 1000 Unit(s) Oral daily  cyanocobalamin 1000 MICROGram(s) Oral daily  erythromycin   Ointment 1 Application(s) Both EYES four times a day  finasteride 5 milliGRAM(s) Oral daily  furosemide   Injectable 40 milliGRAM(s) IV Push once  levothyroxine 75 MICROGram(s) Oral daily  metoprolol tartrate 25 milliGRAM(s) Oral two times a day  oseltamivir 30 milliGRAM(s) Oral daily  pantoprazole    Tablet 40 milliGRAM(s) Oral before breakfast  ranolazine 1000 milliGRAM(s) Oral two times a day  sertraline 100 milliGRAM(s) Oral daily    MEDICATIONS  (PRN):  acetaminophen   Tablet .. 650 milliGRAM(s) Oral every 6 hours PRN Temp greater or equal to 38C (100.4F), Mild Pain (1 - 3)

## 2019-02-13 NOTE — ED ADULT NURSE NOTE - NSIMPLEMENTINTERV_GEN_ALL_ED
Implemented All Fall with Harm Risk Interventions:  [place] to call system. Call bell, personal items and telephone within reach. Instruct patient to call for assistance. Room bathroom lighting operational. Non-slip footwear when patient is off stretcher. Physically safe environment: no spills, clutter or unnecessary equipment. Stretcher in lowest position, wheels locked, appropriate side rails in place. Provide visual cue, wrist band, yellow gown, etc. Monitor gait and stability. Monitor for mental status changes and reorient to person, place, and time. Review medications for side effects contributing to fall risk. Reinforce activity limits and safety measures with patient and family. Provide visual clues: red socks.

## 2019-02-13 NOTE — ED PROVIDER NOTE - PMH
Anemia    BPH (benign prostatic hyperplasia)    CRI (chronic renal insufficiency)    HTN (hypertension)

## 2019-02-13 NOTE — ED ADULT NURSE NOTE - OBJECTIVE STATEMENT
pt. presents from [facility] for AMS since yesterday. As per pt. healthcare proxy(daughter) pt. had a fall yesterday and was seen in [facility] and was discharged back to [facility]. Since yesterday pt. has been increasing lethargic and AMS. pt. is unable to speak and unable to answer questions and is poor historian. pt. presents from [facility] for AMS since yesterday. As per pt. healthcare proxy(daughter) pt. had a fall yesterday and was seen in [facility] and was discharged back to [facility]. Since yesterday pt. has been increasing lethargic and AMS. pt. is unable to speak and unable to answer questions and is poor historian. pt. has bruises from fall yesterday to b/l forearms, left hip, back of head and staples intact and dry. pt. has skin tear to left buttock.

## 2019-02-13 NOTE — ED PROVIDER NOTE - OBJECTIVE STATEMENT
89 yo male biba from [facility] for ams as per daughter. pt fell on 2/11/19 from bed as per family. pt was pan scanned and was found to hace sah, had 3 ct with no increase of sah and was sent back to [facility]. Today daughter states "something is not right" with her father. pt is awake, alert, can answer questions denies pain.

## 2019-02-13 NOTE — DIETITIAN INITIAL EVALUATION ADULT. - OTHER INFO
Pt seen for consult BMI <18. Anemia, BPH, CKD stage III (baseline 1.5 in January) and HTN, diastolic CHF, mod to severe AS, DM who presented to the ED from [facility] for altered mental status. As per report patient fell on 2/11,  Pt admitted AMS was able to walk unassisted and oriented. Pt appears frail and thin. Pt with very clear and significant signs of muscle and fat wasting (pt noted with advanced age and weighted against NFPE). Pt on pureed diet. No noted chewing and swallowing difficulty (pt with missing teeth). No n/v/d/c noted. NFPE showed clear severe muscle wasting in temple, clavicle, deltoid, interosseous; Severe fat wasting in triceps and ribs. 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 Recommend ensure enlive BID, monitor weight and monitor PO intake. Will continue to monitor pt's nutritional status.

## 2019-02-13 NOTE — DIETITIAN INITIAL EVALUATION ADULT. - PERTINENT LABORATORY DATA
02-13 Na139 mmol/L Glu 127 mg/dL<H> K+ 4.2 mmol/L Cr  1.59 mg/dL<H> BUN 38 mg/dL<H> Phos n/a   Alb 2.6 g/dL<L> PAB n/a

## 2019-02-13 NOTE — H&P ADULT - ASSESSMENT
89 year old man with history of Anemia, BPH, CRI and HTN who presented to the ED from [facility] for altered mental status. As per report patient fell on 2/11, was brought to the ED - he had a CT scan which showed a small SAH. He was discharged from the ED back to [facility] as there was no acute neurosurgical intervention was done. He returns today accompanied by his daughter with complaints of AMS. Repeat HEad CT demonstrates no significant changes. Further workup showed ANemia H/H 7.8/21.8- CT chest showed Moderate CHF with mild interstitial/minimal alveolar pulmonary edema and small-moderate left and small right pleural effusions. Moderate bibasilar passive atelectasis. Patience received 1 U PRBC in the ED.     *Acute toxic/metabolic encephalopathy  *Anemia- AoCD  *CHF- unknown EF  *chronic SAH/SDH- traumatic secondary to fall  *Acute on Chronic Kidney Disease  *positive troponin 0.097-->0.177    - telemetry monitoring  - no leukocytosis  - 1U PRBC  - check repeat CBC  - Neurosurgery consult noted.   - no acute surgical intervention  - neuro checks every 4 hours  - Hold aspirina nd Plavix  - cardiology consult   - EKG results noted  - trend Creatinine    Case d/w Dr Huang. 89 year old man with history of Anemia, BPH, CRI and HTN who presented to the ED from [facility] for altered mental status. As per report patient fell on 2/11, was brought to the ED - he had a CT scan which showed a small SAH. He was discharged from the ED back to [facility] as there was no acute neurosurgical intervention was done. He returns today accompanied by his daughter with complaints of AMS. Repeat HEad CT demonstrates no significant changes. Further workup showed ANemia H/H 7.8/21.8- CT chest showed Moderate CHF with mild interstitial/minimal alveolar pulmonary edema and small-moderate left and small right pleural effusions. Moderate bibasilar passive atelectasis. Patience received 1 U PRBC in the ED.     *Acute toxic/metabolic encephalopathy  *Anemia- AoCD  *CHF- unknown EF  *chronic SAH/SDH- traumatic secondary to fall  *Acute on Chronic Kidney Disease  *positive troponin 0.097-->0.177    - telemetry monitoring  - no leukocytosis  - 1U PRBC  - check repeat CBC- improved to >9.0  - Neurosurgery consult noted.   - no acute surgical intervention  - neuro checks every 4 hours  - Hold aspirin  and Plavix  - cardiology consult   - EKG results noted  - trend Creatinine  - check MRI head   - Contacted Dr Grijalva   ([facility] Physician -for collateral information- awaiting callback)  - I spoke with his daughter Mrs Alexis and updated her of the plan.   - Cynthia is DNR/DNI- MOLST form signed.     Case d/w Dr Huang. 89 year old man with history of Anemia, BPH, CRI and HTN who presented to the ED from [facility] for altered mental status. As per report patient fell on 2/11, was brought to the ED - he had a CT scan which showed a small SAH. He was discharged from the ED back to [facility] as there was no acute neurosurgical intervention was done. He returns today accompanied by his daughter with complaints of AMS. Repeat HEad CT demonstrates no significant changes. Further workup showed Anemia H/H 7.8/21.8- CT chest showed Moderate CHF with mild interstitial/minimal alveolar pulmonary edema and small-moderate left and small right pleural effusions. Moderate bibasilar passive atelectasis. Patience received 1 U PRBC in the ED.     *Acute toxic/metabolic encephalopathy  *Anemia- AoCD suspected  *acute diastolic CHF  *subacute SAH/SDH- traumatic secondary to fall  *Acute on Chronic Kidney Disease stage III  *positive troponin 0.097-->0.177    - telemetry monitoring  - no leukocytosis  - 1U PRBC given in ED  - Give 40 IVP lasix now - monitor resp status and fluid balance with AS  - check repeat CBC- improved to >9.0   - Anemia workup - no obvious bleeding noted  - Neurosurgery consult noted.   - no acute surgical intervention  - neuro checks every 4 hours  - Hold aspirin  and Plavix  - cardiology consult   - EKG results noted  - trend Creatinine  - check MRI head   - Contacted Dr Grijalva   ([facility] Physician -for collateral information- awaiting callback)  - I spoke with his daughter Mrs Alexis and updated her of the plan.   - Paitent is DNR/DNI- MOLST form signed.     Case d/w Dr Huang.

## 2019-02-13 NOTE — H&P ADULT - NSHPLABSRESULTS_GEN_ALL_CORE
< from: CT Chest No Cont (02.13.19 @ 05:21) >    EXAM:  CT ABDOMEN AND PELVIS                          EXAM:  CT CHEST                            PROCEDURE DATE:  02/13/2019          INTERPRETATION:  CLINICAL INDICATION: Status post fall.    TECHNIQUE: CT of the Chest, Abdomen and Pelvis was performed without   intravenous contrast. Intravenous contrast: None. Oral contrast: None.   Sagittal and coronal reformats were performed. This scan was performed   using automatic exposure control (radiation dose reduction software) to   obtain a diagnostic image quality scan with patient dose as low as   reasonably achievable.     COMPARISON: None.    FINDINGS:       CHEST:    LUNGS, AIRWAYS: Small-moderate amount of dependent mucus within the upper   thoracic trachea. The central airways are patent. Moderate bibasilar   passive atelectasis greater on left. Mild interlobular septal thickening   suggesting interstitial pulmonary edema. Small dependent consolidations   within the bilateral upper lobes suggestive of minimal alveolar pulmonary   edema.  PLEURA: Small right and small-moderate left pleural effusions. No   pneumothorax.  HEART AND VESSELS: Heart is normal in size. No pericardial effusion.   Moderate-severe aortic valve and mitral annulus calcifications.   Moderate-severe coronary artery calcifications. Thoracic aorta is of   normal caliber. The upper-mid thoracic esophagus is mildly patulous and   debris filled.  MEDIASTINUM, RAMESH, AXILLAE: No mediastinal hematoma. No adenopathy.  BONES: No acute bony pathology or fracture. Severe degenerative changes   of the left glenohumeral joint.  CHEST WALL/SOFT TISSUES: Within normal limits.    ABDOMEN/PELVIS:    LIVER: There are multiple bilateral hepatic cysts largest in the right   hepatic lobe measuring up to 3.8 cm. Otherwise the liver is within normal   limits.  BILE DUCTS: Normal caliber.  GALLBLADDER: Within normal limits.  SPLEEN: Within normal limits.  PANCREAS: Within normal limits.  ADRENALS: Within normal limits.  KIDNEYS/URETERS: Posterior mid pole right renal cystmeasuring up to 4.0   cm, with a peripheral 0.3 cm coarse calcification. 2 left renal cysts,   largest in the posterior lateral midpole measuring up to 5.2 cm.   Otherwise the bilateral kidneys/ureters are within normal limits.  BLADDER: Within normal limits.  REPRODUCTIVE ORGANS: Processes normal in size with a peripheral coarse   calcifications.  GROIN AND PELVIC SIDEWALL: No adenopathy.  BOWEL: No acute bowel pathology or obstruction. Appendix is within normal   limits (axial images 110-116 series 2). there is fecal material   throughout the colon suggesting constipation the correct clinical   setting. Extensive splenic flexure, descending, and sigmoid colon   diverticulosis is noted.  PERITONEUM: No free fluid. No free air.  VESSELS:  Abdominal aorta is of normal caliber with diffuse   circumferential calcifications.  RETROPERITONEUM: No lymphadenopathy.    ABDOMINAL WALL/SOFT TISSUES: Within normal limits.  BONES: No acute bony pathology or fracture. Unilateral right L5 pars   interarticularis defect with minimal grade 1 listhesis of L5 upon S1.   Mild dextroscoliosis of the lumbar spine.    IMPRESSION:     CHEST:  No acute thoracic traumatic injury or fracture.    Moderate CHF with mild interstitial/minimal alveolar pulmonary edema and   small-moderate left and small right pleural effusions. Moderate bibasilar   passive atelectasis.    Moderate-severe aortic valve calcifications.    ABDOMINAL/PELVIS:  No acute abdominal/pelvic traumatic injury or fracture.

## 2019-02-13 NOTE — ED PROVIDER NOTE - PROGRESS NOTE DETAILS
pt seen by neurosurgery for sah, guiaic negative, brown stool, qc checked, lot 135, exp 6/30/20, will give pt 1 unit prbc, due to anemia with positive troponin, recanned to r/o traumatic injury as source of anemia but anemia probably due to cri with crhonic anemia. spoke with family at bedside, spoke with Dr. Alberto to telemetry PRASHANT Lawson DO

## 2019-02-14 LAB
ANION GAP SERPL CALC-SCNC: 6 MMOL/L — SIGNIFICANT CHANGE UP (ref 5–17)
BUN SERPL-MCNC: 30 MG/DL — HIGH (ref 7–23)
CALCIUM SERPL-MCNC: 7.9 MG/DL — LOW (ref 8.5–10.1)
CHLORIDE SERPL-SCNC: 105 MMOL/L — SIGNIFICANT CHANGE UP (ref 96–108)
CO2 SERPL-SCNC: 30 MMOL/L — SIGNIFICANT CHANGE UP (ref 22–31)
CREAT SERPL-MCNC: 1.34 MG/DL — HIGH (ref 0.5–1.3)
CULTURE RESULTS: NO GROWTH — SIGNIFICANT CHANGE UP
FOLATE SERPL-MCNC: 14.4 NG/ML — SIGNIFICANT CHANGE UP
GLUCOSE SERPL-MCNC: 111 MG/DL — HIGH (ref 70–99)
HCT VFR BLD CALC: 27.8 % — LOW (ref 39–50)
HGB BLD-MCNC: 9.2 G/DL — LOW (ref 13–17)
IRON SATN MFR SERPL: 14 % — LOW (ref 16–55)
IRON SATN MFR SERPL: 30 UG/DL — LOW (ref 45–165)
MCHC RBC-ENTMCNC: 31.6 PG — SIGNIFICANT CHANGE UP (ref 27–34)
MCHC RBC-ENTMCNC: 33.1 GM/DL — SIGNIFICANT CHANGE UP (ref 32–36)
MCV RBC AUTO: 95.5 FL — SIGNIFICANT CHANGE UP (ref 80–100)
NRBC # BLD: 0 /100 WBCS — SIGNIFICANT CHANGE UP (ref 0–0)
PLATELET # BLD AUTO: 164 K/UL — SIGNIFICANT CHANGE UP (ref 150–400)
POTASSIUM SERPL-MCNC: 3.4 MMOL/L — LOW (ref 3.5–5.3)
POTASSIUM SERPL-SCNC: 3.4 MMOL/L — LOW (ref 3.5–5.3)
RBC # BLD: 2.91 M/UL — LOW (ref 4.2–5.8)
RBC # BLD: 2.91 M/UL — LOW (ref 4.2–5.8)
RBC # FLD: 17.1 % — HIGH (ref 10.3–14.5)
RETICS #: 56.5 K/UL — SIGNIFICANT CHANGE UP (ref 25–125)
RETICS/RBC NFR: 1.9 % — SIGNIFICANT CHANGE UP (ref 0.5–2.5)
SODIUM SERPL-SCNC: 141 MMOL/L — SIGNIFICANT CHANGE UP (ref 135–145)
SPECIMEN SOURCE: SIGNIFICANT CHANGE UP
TIBC SERPL-MCNC: 221 UG/DL — SIGNIFICANT CHANGE UP (ref 220–430)
TSH SERPL-MCNC: 3.85 UU/ML — SIGNIFICANT CHANGE UP (ref 0.34–4.82)
UIBC SERPL-MCNC: 191 UG/DL — SIGNIFICANT CHANGE UP (ref 110–370)
VIT B12 SERPL-MCNC: 1212 PG/ML — SIGNIFICANT CHANGE UP (ref 232–1245)
WBC # BLD: 8.71 K/UL — SIGNIFICANT CHANGE UP (ref 3.8–10.5)
WBC # FLD AUTO: 8.71 K/UL — SIGNIFICANT CHANGE UP (ref 3.8–10.5)

## 2019-02-14 PROCEDURE — 99231 SBSQ HOSP IP/OBS SF/LOW 25: CPT

## 2019-02-14 RX ADMIN — Medication 1 APPLICATION(S): at 23:42

## 2019-02-14 RX ADMIN — Medication 1000 UNIT(S): at 12:37

## 2019-02-14 RX ADMIN — Medication 25 MILLIGRAM(S): at 17:57

## 2019-02-14 RX ADMIN — Medication 25 MILLIGRAM(S): at 05:37

## 2019-02-14 RX ADMIN — Medication 30 MILLIGRAM(S): at 12:37

## 2019-02-14 RX ADMIN — ATORVASTATIN CALCIUM 80 MILLIGRAM(S): 80 TABLET, FILM COATED ORAL at 23:42

## 2019-02-14 RX ADMIN — SERTRALINE 100 MILLIGRAM(S): 25 TABLET, FILM COATED ORAL at 12:37

## 2019-02-14 RX ADMIN — Medication 1 APPLICATION(S): at 12:37

## 2019-02-14 RX ADMIN — Medication 25 MILLIGRAM(S): at 17:56

## 2019-02-14 RX ADMIN — AMLODIPINE BESYLATE 5 MILLIGRAM(S): 2.5 TABLET ORAL at 05:37

## 2019-02-14 RX ADMIN — RANOLAZINE 1000 MILLIGRAM(S): 500 TABLET, FILM COATED, EXTENDED RELEASE ORAL at 05:38

## 2019-02-14 RX ADMIN — Medication 75 MICROGRAM(S): at 05:37

## 2019-02-14 RX ADMIN — Medication 1 APPLICATION(S): at 00:28

## 2019-02-14 RX ADMIN — FINASTERIDE 5 MILLIGRAM(S): 5 TABLET, FILM COATED ORAL at 12:37

## 2019-02-14 RX ADMIN — PREGABALIN 1000 MICROGRAM(S): 225 CAPSULE ORAL at 12:37

## 2019-02-14 RX ADMIN — Medication 1 APPLICATION(S): at 17:53

## 2019-02-14 RX ADMIN — Medication 1 APPLICATION(S): at 05:38

## 2019-02-14 RX ADMIN — PANTOPRAZOLE SODIUM 40 MILLIGRAM(S): 20 TABLET, DELAYED RELEASE ORAL at 05:37

## 2019-02-14 RX ADMIN — RANOLAZINE 1000 MILLIGRAM(S): 500 TABLET, FILM COATED, EXTENDED RELEASE ORAL at 17:53

## 2019-02-14 NOTE — PROGRESS NOTE ADULT - ASSESSMENT
A/P: 89 year old man who presented with AMS, found to have an acute CHF exacerbation   CTH is stable regarding SD collection size.  Non surgical findings.    No neurosurgical intervention     Continue medical management     Hold ASA for 2 weeks    Pt can follow up with the NP in Dr. Mejia's office in 2-4 weeks     Will sign off, please reconsult as needed     Discussed with Dr. Mejia

## 2019-02-14 NOTE — CONSULT NOTE ADULT - SUBJECTIVE AND OBJECTIVE BOX
Patient is a 89y old  Male who presents with a chief complaint of altered mental status.     HPI:  89 year old man with history of Anemia, BPH, CKD stage III (baseline 1.5 in January) and HTN, diastolic CHF, mod to severe AS, DM who presented to the ED from [facility] for altered mental status.   Cardiology team consulted to evaluate for elevated cardiac enzymes.  Pt denies any CP or SOB but c/o R shoulder pain.  He states that he fell down and he was from [facility].     PMH- kidney failure, anemia, acute ischemic heart disease, BPH, Anxiety DO, HTN, HLD, dysphagia  PSH- unable to be obtained- due to mental status  FMH- unable to be obtained due to mental status. (2019 11:05)      PAST MEDICAL & SURGICAL HISTORY:  BPH (benign prostatic hyperplasia)  HTN (hypertension)  CRI (chronic renal insufficiency)  Anemia      MEDICATIONS  (STANDING):  amLODIPine   Tablet 5 milliGRAM(s) Oral daily  atorvastatin 80 milliGRAM(s) Oral at bedtime  bethanechol 25 milliGRAM(s) Oral two times a day  cholecalciferol 1000 Unit(s) Oral daily  cyanocobalamin 1000 MICROGram(s) Oral daily  erythromycin   Ointment 1 Application(s) Both EYES four times a day  finasteride 5 milliGRAM(s) Oral daily  levothyroxine 75 MICROGram(s) Oral daily  metoprolol tartrate 25 milliGRAM(s) Oral two times a day  oseltamivir 30 milliGRAM(s) Oral daily  pantoprazole    Tablet 40 milliGRAM(s) Oral before breakfast  ranolazine 1000 milliGRAM(s) Oral two times a day  sertraline 100 milliGRAM(s) Oral daily    MEDICATIONS  (PRN):  acetaminophen   Tablet .. 650 milliGRAM(s) Oral every 6 hours PRN Temp greater or equal to 38C (100.4F), Mild Pain (1 - 3)      FAMILY HISTORY:  unable to obtain       SOCIAL HISTORY:  no recent smoking per records     REVIEW OF SYSTEMS:  CONSTITUTIONAL:    no fever chills     ENT:  No epistaxis.   RESPIRATORY:  No cough.    No shortness of breath.  CARDIOVASCULAR:  No chest pains.   No shortness of breath, No orthopnea or PND.  GASTROINTESTINAL:  No abdominal pain.  No nausea or vomiting.    MUSCULOSKELETAL:  c/o right shoulder pain   PSYCHIATRIC:  c/o confusion at presentation  ENDOCRINE:  No unexplained weight loss.  No polydipsia.   HEMATOLOGIC:  No anemia.  No prolonged or excessive bleeding.   ALLERGIC AND IMMUNOLOGIC:  No pruritus.          Vital Signs Last 24 Hrs  T(C): 36.6 (2019 05:04), Max: 36.9 (2019 13:37)  T(F): 97.8 (2019 05:04), Max: 98.4 (2019 13:37)  HR: 71 (2019 05:04) (70 - 82)  BP: 155/61 (2019 05:04) (135/51 - 172/66)  BP(mean): --  RR: 17 (2019 05:04) (16 - 18)  SpO2: 91% (2019 05:04) (90% - 98%)    PHYSICAL EXAM-    Constitutional: no acute distress    Head: Head is normocephalic and atraumatic.      Neck: There are strong carotid pulses bilaterally. No JVD.     Cardiovascular: Regular rate and rhythm without S3, S4. systolic murmur 2/6    Respiratory:  Decreased breathsounds at bases.     Abdomen: Soft, nontender, nondistended with positive bowel sounds.      Extremity: No tenderness. No  pitting edema     Neurologic: The patient is alert but not oriented       Psychiatric: The patient appears to be emotionally stable.      INTERPRETATION OF TELEMETRY: SR 60-80/min, isolated PVCs.     ECG: Sinus rythm , l axis, poor R wave progresion, EKG similar to the EKG from 2018.   I&O's Detail    2019 07:01  -  2019 07:00  --------------------------------------------------------  IN:  Total IN: 0 mL    OUT:    Voided: 1 mL  Total OUT: 1 mL    Total NET: -1 mL          LABS:                        9.2    8.71  )-----------( 164      ( 2019 05:30 )             27.8     02-14    141  |  105  |  30<H>  ----------------------------<  111<H>  3.4<L>   |  30  |  1.34<H>    Ca    7.9<L>      2019 05:30    TPro  5.7<L>  /  Alb  2.6<L>  /  TBili  0.8  /  DBili  x   /  AST  52<H>  /  ALT  33  /  AlkPhos  81  02-13    CARDIAC MARKERS ( 2019 12:30 )  0.566 ng/mL / x     / x     / x     / x      CARDIAC MARKERS ( 2019 04:54 )  0.177 ng/mL / x     / x     / x     / x      CARDIAC MARKERS ( 2019 01:20 )  0.097 ng/mL / x     / x     / x     / x          PT/INR - ( 2019 01:20 )   PT: 11.9 sec;   INR: 1.07 ratio         PTT - ( 2019 01:20 )  PTT:29.1 sec  Urinalysis Basic - ( 2019 01:20 )    Color: Yellow / Appearance: Clear / S.015 / pH: x  Gluc: x / Ketone: Negative  / Bili: Negative / Urobili: Negative mg/dL   Blood: x / Protein: 30 mg/dL / Nitrite: Negative   Leuk Esterase: Trace / RBC: Negative /HPF / WBC 0-2   Sq Epi: x / Non Sq Epi: Negative / Bacteria: Negative      I&O's Summary    2019 07:01  -  2019 07:00  --------------------------------------------------------  IN: 0 mL / OUT: 1 mL / NET: -1 mL      BNP  RADIOLOGY & ADDITIONAL STUDIES:  < from: CT Chest No Cont (19 @ 05:21) >  IMPRESSION:     CHEST:  No acute thoracic traumatic injury or fracture.    Moderate CHF with mild interstitial/minimal alveolar pulmonary edema and   small-moderate left and small right pleural effusions. Moderate bibasilar   passive atelectasis.    Moderate-severe aortic valve calcifications.    ABDOMINAL/PELVIS:  No acute abdominal/pelvic traumatic injury or fracture.                      MARLENE COREAS M.D., ATTENDING RADIOLOGIST  This document has been electronically signed. 2019  8:09AM    < end of copied text >

## 2019-02-14 NOTE — PROGRESS NOTE ADULT - SUBJECTIVE AND OBJECTIVE BOX
Neurosurgery PA Follow-up note:    89 year old man s/p fall on 2/11 and was found to have a small SAH. He was held in the ED and then transferred back to his rehab/assisted living facility once the scan was stable. He was again brought to the ED on 2/13 because he was found to have AMS, a repeat CT head was done and the SAH was again found to be stable.     Pt seen and examined this am, sitting up in bed, awake and alert, oriented 2-3, moving all extremities, denies headache.     Vital Signs Last 24 Hrs  T(C): 36.6 (14 Feb 2019 05:04), Max: 36.9 (13 Feb 2019 13:37)  T(F): 97.8 (14 Feb 2019 05:04), Max: 98.4 (13 Feb 2019 13:37)  HR: 71 (14 Feb 2019 05:04) (70 - 82)  BP: 155/61 (14 Feb 2019 05:04) (135/51 - 172/66)  RR: 17 (14 Feb 2019 05:04) (16 - 18)  SpO2: 91% (14 Feb 2019 05:04) (90% - 98%)    < from: CT Head No Cont (02.13.19 @ 02:48) >  IMPRESSION: Small volume acute left parieto-occipital subarachnoid   hemorrhage.    Bilateral holohemispheric low-density subdural collections may represent   subdural hygromas versus chronic subdural hematomas.

## 2019-02-14 NOTE — PROGRESS NOTE ADULT - SUBJECTIVE AND OBJECTIVE BOX
CC- AMS    HPI:  89 year old man with history of Anemia, BPH, CKD stage III (baseline 1.5 in January) and HTN, diastolic CHF, mod to severe AS, DM who presented to the ED from [facility] for altered mental status. As per report patient fell on , was brought to the ED - he had a CT scan which showed a small SAH. He was discharged from the ED back to [facility] as there was no acute neurosurgical intervention was done. He returns today accompanied by his daughter with complaints of AMS. Repeat HEad CT demonstrates no significant changes. Further workup showed ANemia H/H 7.8/21.8 (s/p 1 unit PRBC)- CT chest showed Moderate CHF with mild interstitial/minimal alveolar pulmonary edema and small-moderate left and small right pleural effusions. Moderate bibasilar passive atelectasis. Patience received 1 U PRBC in the ED.   daughter was notified and states that since monday, he has been more confused. Per [facility] documentation, patient was able to walk unassisted and was alert and oriented. Made call out to Dr Gamboa-> awaiting call back  PMH- kidney failure, anemia, acute ischemic heart disease, BPH, Anxiety DO, HTN, HLD, dysphagia  PSH- unable to be obtained- due to mental status  FMH- unable to be obtained due to mental status. (2019 11:05)    19- denies acute complaints    Review of system- All 10 systems reviewed and is as per HPI otherwise negative.     T(C): 36.6 (19 @ 05:04), Max: 36.9 (19 @ 20:23)  HR: 71 (19 @ 05:04) (70 - 82)  BP: 155/61 (19 @ 05:04) (135/51 - 155/61)  RR: 17 (19 @ 05:04) (17 - 18)  SpO2: 91% (19 @ 05:04) (91% - 98%)  Wt(kg): --    LABS:                        9.2    8.71  )-----------( 164      ( 2019 05:30 )             27.8     141  |  105  |  30<H>  ----------------------------<  111<H>  3.4<L>   |  30  |  1.34<H>    Ca    7.9<L>      2019 05:30    TPro  5.7<L>  /  Alb  2.6<L>  /  TBili  0.8  /  DBili  x   /  AST  52<H>  /  ALT  33  /  AlkPhos  81  02-13    PT/INR - ( 2019 01:20 )   PT: 11.9 sec;   INR: 1.07 ratio       PTT - ( 2019 01:20 )  PTT:29.1 sec    Urinalysis Basic - ( 2019 01:20 )  Color: Yellow / Appearance: Clear / S.015 / pH: x  Gluc: x / Ketone: Negative  / Bili: Negative / Urobili: Negative mg/dL   Blood: x / Protein: 30 mg/dL / Nitrite: Negative   Leuk Esterase: Trace / RBC: Negative /HPF / WBC 0-2   Sq Epi: x / Non Sq Epi: Negative / Bacteria: Negative    CARDIAC MARKERS ( 2019 12:30 )  0.566 ng/mL / x     / x     / x     / x      CARDIAC MARKERS ( 2019 04:54 )  0.177 ng/mL / x     / x     / x     / x      CARDIAC MARKERS ( 2019 01:20 )  0.097 ng/mL / x     / x     / x     / x        RADIOLOGY & ADDITIONAL TESTS:  EXAM:  CT ABDOMEN AND PELVIS                        EXAM:  CT CHEST                        PROCEDURE DATE:  2019    INTERPRETATION:  CLINICAL INDICATION: Status post fall.    TECHNIQUE: CT of the Chest, Abdomen and Pelvis was performed without   intravenous contrast. Intravenous contrast: None. Oral contrast: None.   Sagittal and coronal reformats were performed. This scan was performed   using automatic exposure control (radiation dose reduction software) to   obtain a diagnostic image quality scan with patient dose as low as   reasonably achievable.     COMPARISON: None.    FINDINGS:       CHEST:    LUNGS, AIRWAYS: Small-moderate amount of dependent mucus within the upper   thoracic trachea. The central airways are patent. Moderate bibasilar   passive atelectasis greater on left. Mild interlobular septal thickening   suggesting interstitial pulmonary edema. Small dependent consolidations   within the bilateral upper lobes suggestive of minimal alveolar pulmonary   edema.  PLEURA: Small right and small-moderate left pleural effusions. No   pneumothorax.  HEART AND VESSELS: Heart is normal in size. No pericardial effusion.   Moderate-severe aortic valve and mitral annulus calcifications.   Moderate-severe coronary artery calcifications. Thoracic aorta is of   normal caliber. The upper-mid thoracic esophagus is mildly patulous and   debris filled.  MEDIASTINUM, RAMESH, AXILLAE: No mediastinal hematoma. No adenopathy.  BONES: No acute bony pathology or fracture. Severe degenerative changes   of the left glenohumeral joint.  CHEST WALL/SOFT TISSUES: Within normal limits.    ABDOMEN/PELVIS:    LIVER: There are multiple bilateral hepatic cysts largest in the right   hepatic lobe measuring up to 3.8 cm. Otherwise the liver is within normal   limits.  BILE DUCTS: Normal caliber.  GALLBLADDER: Within normal limits.  SPLEEN: Within normal limits.  PANCREAS: Within normal limits.  ADRENALS: Within normal limits.  KIDNEYS/URETERS: Posterior mid pole right renal cystmeasuring up to 4.0   cm, with a peripheral 0.3 cm coarse calcification. 2 left renal cysts,   largest in the posterior lateral midpole measuring up to 5.2 cm.   Otherwise the bilateral kidneys/ureters are within normal limits.  BLADDER: Within normal limits.  REPRODUCTIVE ORGANS: Processes normal in size with a peripheral coarse   calcifications.  GROIN AND PELVIC SIDEWALL: No adenopathy.  BOWEL: No acute bowel pathology or obstruction. Appendix is within normal   limits (axial images 110-116 series 2). there is fecal material   throughout the colon suggesting constipation the correct clinical   setting. Extensive splenic flexure, descending, and sigmoid colon   diverticulosis is noted.  PERITONEUM: No free fluid. No free air.  VESSELS:  Abdominal aorta is of normal caliber with diffuse   circumferential calcifications.  RETROPERITONEUM: No lymphadenopathy.    ABDOMINAL WALL/SOFT TISSUES: Within normal limits.  BONES: No acute bony pathology or fracture. Unilateral right L5 pars   interarticularis defect with minimal grade 1 listhesis of L5 upon S1.   Mild dextroscoliosis of the lumbar spine.    IMPRESSION:     CHEST:  No acute thoracic traumatic injury or fracture.    Moderate CHF with mild interstitial/minimal alveolar pulmonary edema and   small-moderate left and small right pleural effusions. Moderate bibasilar   passive atelectasis.  Moderate-severe aortic valve calcifications.    ABDOMINAL/PELVIS:  No acute abdominal/pelvic traumatic injury or fracture.    EXAM:  MR BRAIN                        PROCEDURE DATE:  2019    INTERPRETATION:    MR brain  without gadolinium     CLINICAL INFORMATION:   Altered mental status Stroke.    TECHNIQUE:   Sagittal and axial T1-weighted images, axial FLAIR images,   axial gradient echo and T2-weighted images and axial diffusion weighted   images of the brain were obtained.         FINDINGS:   CT dated 2019 available for review.         The brain demonstrates moderate BILATERAL extra-axial fluid collections   with compression of the underlying hemispheres consistent with chronic   subdural hygromas or hematomas. Mild periventricular and deep white   matter ischemia is noted.  No acute cerebral cortical infarct is found.     No acute intracranial hemorrhage is recognized. No mass effect is found   in the brain.        The ventricles, sulci and basal cisterns appear unremarkable.    The vertebral and internal carotid arteries demonstrate expected flow   voids indicating their patency.    The orbits are unremarkable.  The paranasal sinuses are clear.  The nasal   cavity appears intact.  The nasopharynx is symmetric.  The central skull   base and temporal bones are intact.  The calvarium appears unremarkable.    IMPRESSION:    moderate BILATERAL extra-axial fluid collections with   compression of the underlying hemispheres consistent with chronic   subdural hygromas or hematomas. Mild periventricular and deep white   matter ischemia is noted.      PHYSICAL EXAM:  GENERAL: NAD, well-groomed, well-developed  HEAD:  Atraumatic, Normocephalic  EYES: EOMI, PERRLA, conjunctiva and sclera clear  HEENT: Moist mucous membranes  NECK: Supple, No JVD  NERVOUS SYSTEM:  Alert & Oriented X3,   CHEST/LUNG: POor resp effort, decreased air at bases  HEART: Regular rate and rhythm; No murmurs, rubs, or gallops  ABDOMEN: Soft, Nontender, Nondistended; Bowel sounds present  GENITOURINARY- Voiding, no palpable bladder  EXTREMITIES:  2+ Peripheral Pulses, No clubbing, cyanosis, or edema  MUSCULOSKELTAL- severe muscle wasting  SKIN-no rash, no lesion      Daily Weight in k.7 (2019 06:34)    MEDICATIONS  (STANDING):  amLODIPine   Tablet 5 milliGRAM(s) Oral daily  atorvastatin 80 milliGRAM(s) Oral at bedtime  bethanechol 25 milliGRAM(s) Oral two times a day  cholecalciferol 1000 Unit(s) Oral daily  cyanocobalamin 1000 MICROGram(s) Oral daily  erythromycin   Ointment 1 Application(s) Both EYES four times a day  finasteride 5 milliGRAM(s) Oral daily  levothyroxine 75 MICROGram(s) Oral daily  metoprolol tartrate 25 milliGRAM(s) Oral two times a day  pantoprazole    Tablet 40 milliGRAM(s) Oral before breakfast  ranolazine 1000 milliGRAM(s) Oral two times a day  sertraline 100 milliGRAM(s) Oral daily    MEDICATIONS  (PRN):  acetaminophen   Tablet .. 650 milliGRAM(s) Oral every 6 hours PRN Temp greater or equal to 38C (100.4F), Mild Pain (1 - 3)    Assessment/Plan  #Acute toxic/metabolic encephalopathy  Resolved, pt appears to be back to baseline    #Anemia- AoCD suspected  No evidence of acute bleed  S/p PRBC transfusion  HH stable    #acute on chronic diastolic CHF  Cardio eval appreciated  Cont LAsix 20mg IV daily    #subacute SAH/SDH- traumatic secondary to fall  NS eval appreciated  Hold Aspirin x 2 weeks  For outpatient f/u    #Acute on Chronic Kidney Disease stage III  Cr improving    #positive troponin 0.097-->0.177  Cardio f/u appreciated  Further ischemic work up per cardiology    #Dispo- PT eval/OOB with PT.

## 2019-02-15 LAB
ADD ON TEST-SPECIMEN IN LAB: SIGNIFICANT CHANGE UP
ANION GAP SERPL CALC-SCNC: 4 MMOL/L — LOW (ref 5–17)
BUN SERPL-MCNC: 31 MG/DL — HIGH (ref 7–23)
CALCIUM SERPL-MCNC: 8.2 MG/DL — LOW (ref 8.5–10.1)
CHLORIDE SERPL-SCNC: 107 MMOL/L — SIGNIFICANT CHANGE UP (ref 96–108)
CO2 SERPL-SCNC: 31 MMOL/L — SIGNIFICANT CHANGE UP (ref 22–31)
CREAT SERPL-MCNC: 1.24 MG/DL — SIGNIFICANT CHANGE UP (ref 0.5–1.3)
GLUCOSE SERPL-MCNC: 116 MG/DL — HIGH (ref 70–99)
HCT VFR BLD CALC: 27.5 % — LOW (ref 39–50)
HGB BLD-MCNC: 9.1 G/DL — LOW (ref 13–17)
MCHC RBC-ENTMCNC: 32.2 PG — SIGNIFICANT CHANGE UP (ref 27–34)
MCHC RBC-ENTMCNC: 33.1 GM/DL — SIGNIFICANT CHANGE UP (ref 32–36)
MCV RBC AUTO: 97.2 FL — SIGNIFICANT CHANGE UP (ref 80–100)
NRBC # BLD: 0 /100 WBCS — SIGNIFICANT CHANGE UP (ref 0–0)
NT-PROBNP SERPL-SCNC: 6400 PG/ML — HIGH (ref 0–450)
PLATELET # BLD AUTO: 163 K/UL — SIGNIFICANT CHANGE UP (ref 150–400)
POTASSIUM SERPL-MCNC: 3.7 MMOL/L — SIGNIFICANT CHANGE UP (ref 3.5–5.3)
POTASSIUM SERPL-SCNC: 3.7 MMOL/L — SIGNIFICANT CHANGE UP (ref 3.5–5.3)
RBC # BLD: 2.83 M/UL — LOW (ref 4.2–5.8)
RBC # FLD: 17 % — HIGH (ref 10.3–14.5)
SODIUM SERPL-SCNC: 142 MMOL/L — SIGNIFICANT CHANGE UP (ref 135–145)
WBC # BLD: 8.44 K/UL — SIGNIFICANT CHANGE UP (ref 3.8–10.5)
WBC # FLD AUTO: 8.44 K/UL — SIGNIFICANT CHANGE UP (ref 3.8–10.5)

## 2019-02-15 PROCEDURE — 93306 TTE W/DOPPLER COMPLETE: CPT | Mod: 26

## 2019-02-15 RX ORDER — OXYCODONE AND ACETAMINOPHEN 5; 325 MG/1; MG/1
1 TABLET ORAL ONCE
Qty: 0 | Refills: 0 | Status: DISCONTINUED | OUTPATIENT
Start: 2019-02-15 | End: 2019-02-15

## 2019-02-15 RX ORDER — OXYCODONE HYDROCHLORIDE 5 MG/1
5 TABLET ORAL ONCE
Qty: 0 | Refills: 0 | Status: DISCONTINUED | OUTPATIENT
Start: 2019-02-15 | End: 2019-02-15

## 2019-02-15 RX ORDER — FUROSEMIDE 40 MG
40 TABLET ORAL DAILY
Qty: 0 | Refills: 0 | Status: DISCONTINUED | OUTPATIENT
Start: 2019-02-15 | End: 2019-02-17

## 2019-02-15 RX ADMIN — Medication 1 APPLICATION(S): at 07:12

## 2019-02-15 RX ADMIN — Medication 1 APPLICATION(S): at 22:15

## 2019-02-15 RX ADMIN — Medication 25 MILLIGRAM(S): at 07:12

## 2019-02-15 RX ADMIN — RANOLAZINE 1000 MILLIGRAM(S): 500 TABLET, FILM COATED, EXTENDED RELEASE ORAL at 07:12

## 2019-02-15 RX ADMIN — Medication 1000 UNIT(S): at 12:30

## 2019-02-15 RX ADMIN — Medication 75 MICROGRAM(S): at 07:13

## 2019-02-15 RX ADMIN — PANTOPRAZOLE SODIUM 40 MILLIGRAM(S): 20 TABLET, DELAYED RELEASE ORAL at 07:12

## 2019-02-15 RX ADMIN — Medication 25 MILLIGRAM(S): at 17:56

## 2019-02-15 RX ADMIN — RANOLAZINE 1000 MILLIGRAM(S): 500 TABLET, FILM COATED, EXTENDED RELEASE ORAL at 17:56

## 2019-02-15 RX ADMIN — Medication 25 MILLIGRAM(S): at 07:13

## 2019-02-15 RX ADMIN — Medication 1 APPLICATION(S): at 17:54

## 2019-02-15 RX ADMIN — FINASTERIDE 5 MILLIGRAM(S): 5 TABLET, FILM COATED ORAL at 12:30

## 2019-02-15 RX ADMIN — Medication 650 MILLIGRAM(S): at 15:48

## 2019-02-15 RX ADMIN — AMLODIPINE BESYLATE 5 MILLIGRAM(S): 2.5 TABLET ORAL at 07:13

## 2019-02-15 RX ADMIN — Medication 30 MILLIGRAM(S): at 12:30

## 2019-02-15 RX ADMIN — PREGABALIN 1000 MICROGRAM(S): 225 CAPSULE ORAL at 12:30

## 2019-02-15 RX ADMIN — OXYCODONE AND ACETAMINOPHEN 1 TABLET(S): 5; 325 TABLET ORAL at 17:56

## 2019-02-15 RX ADMIN — SERTRALINE 100 MILLIGRAM(S): 25 TABLET, FILM COATED ORAL at 12:30

## 2019-02-15 RX ADMIN — Medication 1 APPLICATION(S): at 12:31

## 2019-02-15 RX ADMIN — Medication 40 MILLIGRAM(S): at 12:30

## 2019-02-15 NOTE — PHYSICAL THERAPY INITIAL EVALUATION ADULT - CRITERIA FOR SKILLED THERAPEUTIC INTERVENTIONS
impairments found/therapy frequency/predicted duration of therapy intervention/anticipated equipment needs at discharge/functional limitations in following categories/risk reduction/prevention/rehab potential/anticipated discharge recommendation

## 2019-02-15 NOTE — PHYSICAL THERAPY INITIAL EVALUATION ADULT - PRECAUTIONS/LIMITATIONS, REHAB EVAL
fall precautions fall precautions/?chronic B holohemispheric SDH/hygromas and ACUTE L parieto-occipital SAH

## 2019-02-15 NOTE — PHYSICAL THERAPY INITIAL EVALUATION ADULT - LEVEL OF INDEPENDENCE: SIT/STAND, REHAB EVAL
moderate assist (50% patients effort)/feet sliding forward,upper trunk pushing posteriorly/maximum assist (25% patients effort)

## 2019-02-15 NOTE — PHYSICAL THERAPY INITIAL EVALUATION ADULT - GENERAL OBSERVATIONS, REHAB EVAL
resting in bed with nasal o2 2L/min ,pale,appears fatigued but alert,responsive,able to converse,speech hypophonic ,Ox 2

## 2019-02-15 NOTE — PHYSICAL THERAPY INITIAL EVALUATION ADULT - LEVEL OF INDEPENDENCE: STAND/SIT, REHAB EVAL
moderate assist (50% patients effort)/leans trunk backwards vs forward when assuming sitting like a BOARD,at risk for slipping off EOB

## 2019-02-15 NOTE — PHYSICAL THERAPY INITIAL EVALUATION ADULT - ACTIVE RANGE OF MOTION EXAMINATION, REHAB EVAL
A/AAROM/bilateral upper extremity Active ROM was WFL (within functional limits)/bilateral  lower extremity Active ROM was WFL (within functional limits)

## 2019-02-15 NOTE — PHYSICAL THERAPY INITIAL EVALUATION ADULT - STANDING BALANCE: STATIC
needed 2PA to stand at bedside and assist to pull feet backward under body vs out in front of center of gravity/poor balance

## 2019-02-15 NOTE — PHYSICAL THERAPY INITIAL EVALUATION ADULT - LEVEL OF INDEPENDENCE: SIT/SUPINE, REHAB EVAL
to prevent from slipping off side of bed/moderate assist (50% patients effort)/maximum assist (25% patients effort)

## 2019-02-15 NOTE — PHYSICAL THERAPY INITIAL EVALUATION ADULT - PATIENT PROFILE REVIEW, REHAB EVAL
yes/HCT yes/HCT; small acute L parieto-occipital SAH; bilateral holohemispheric subdural collections which may represent chronic SDH/hygromas

## 2019-02-15 NOTE — PHYSICAL THERAPY INITIAL EVALUATION ADULT - LONG TERM MEMORY, REHAB EVAL
pt tells me he was born in [place],raised in [place] where his parents worked on the [employer]/ [employer]

## 2019-02-15 NOTE — PHYSICAL THERAPY INITIAL EVALUATION ADULT - DIAGNOSIS, PT EVAL
AMS, anemia ,+ Troponin ,CHF,B small pleural effusions R >L ,interstitial pulmonary edema ,fall with small stable SAH treated in ED 2/11/19

## 2019-02-15 NOTE — PROGRESS NOTE ADULT - SUBJECTIVE AND OBJECTIVE BOX
cc: confusion  HPI: 89y male w/ PMH Anemia, BPH, CKD stage III (baseline 1.5 in January) and HTN, diastolic CHF, mod to severe AS, DM who presented to the ED from [facility] for altered mental status. As per report patient fell on 2/11, was brought to the ED - he had a CT scan which showed a small SAH. He was discharged from the ED back to [facility] as there was no acute neurosurgical intervention was done. He returns today accompanied by his daughter with complaints of AMS. Repeat HEad CT demonstrates no significant changes. Further workup showed ANemia H/H 7.8/21.8 (s/p 1 unit PRBC)- CT chest showed Moderate CHF with mild interstitial/minimal alveolar pulmonary edema and small-moderate left and small right pleural effusions. Moderate bibasilar passive atelectasis. Patience received 1 U PRBC in the ED.   2/15- Chart reviewed.  Pt seen early this morning.  Aware he is in hospital but uncertain of circumstances- everything explained.  Pt c/o left hip pain and points to dressing site for pressure ulcer.   Denies cp, sob or palp.      ros- as per hpi, otherwise 10 point ros negative    Vital Signs Last 24 Hrs  T(C): 36.6 (15 Feb 2019 10:57), Max: 36.8 (14 Feb 2019 21:17)  T(F): 97.9 (15 Feb 2019 10:57), Max: 98.3 (14 Feb 2019 21:17)  HR: 60 (15 Feb 2019 10:57) (60 - 75)  BP: 124/46 (15 Feb 2019 10:57) (108/47 - 143/58)  BP(mean): 62 (15 Feb 2019 05:50) (62 - 62)  RR: 18 (15 Feb 2019 10:57) (18 - 20)  SpO2: 100% (15 Feb 2019 10:57) (95% - 100%)            LABS: All Labs Reviewed:                        9.1    8.44  )-----------( 163      ( 15 Feb 2019 05:53 )             27.5     02-15    142  |  107  |  31<H>  ----------------------------<  116<H>  3.7   |  31  |  1.24    Ca    8.2<L>      15 Feb 2019 05:53              PHYSICAL EXAM:  General: pleasant elderly male, NAD, comfortable- lying in bed  Neuro: AAOx2, forgetful, no focal deficits  HEENT: NCAT, poor dentition  Neck: Soft and supple  Respiratory: mild bibasilar rales   Cardiovascular: S1 and S2, RRR  Gastrointestinal: +BS, soft, NTND, no rebound/guarding  Extremities: chronic changes  Vascular: 2+ peripheral pulses  Musculoskeletal: moving all extremities  Skin: bandage LUE, hip, left ankle    < from: MR Head No Cont (02.13.19 @ 16:57) >  IMPRESSION:    moderate BILATERAL extra-axial fluid collections with   compression of the underlying hemispheres consistent with chronic   subdural hygromas or hematomas. Mild periventricular and deep white   matter ischemia is noted.      < end of copied text >    < from: CT Chest No Cont (02.13.19 @ 05:21) >  IMPRESSION:     CHEST:  No acute thoracic traumatic injury or fracture.    Moderate CHF with mild interstitial/minimal alveolar pulmonary edema and   small-moderate left and small right pleural effusions. Moderate bibasilar   passive atelectasis.    Moderate-severe aortic valve calcifications.    ABDOMINAL/PELVIS:  No acute abdominal/pelvic traumatic injury or fracture.    < end of copied text >    MEDICATIONS  (STANDING):  amLODIPine   Tablet 5 milliGRAM(s) Oral daily  atorvastatin 80 milliGRAM(s) Oral at bedtime  bethanechol 25 milliGRAM(s) Oral two times a day  cholecalciferol 1000 Unit(s) Oral daily  cyanocobalamin 1000 MICROGram(s) Oral daily  erythromycin   Ointment 1 Application(s) Both EYES four times a day  finasteride 5 milliGRAM(s) Oral daily  furosemide   Injectable 40 milliGRAM(s) IV Push daily  levothyroxine 75 MICROGram(s) Oral daily  metoprolol tartrate 25 milliGRAM(s) Oral two times a day  oseltamivir 30 milliGRAM(s) Oral <User Schedule>  pantoprazole    Tablet 40 milliGRAM(s) Oral before breakfast  ranolazine 1000 milliGRAM(s) Oral two times a day  sertraline 100 milliGRAM(s) Oral daily    MEDICATIONS  (PRN):  acetaminophen   Tablet .. 650 milliGRAM(s) Oral every 6 hours PRN Temp greater or equal to 38C (100.4F), Mild Pain (1 - 3)          Assessment/Plan    1. acute toxic metabolic encephalopathy  - resolved    2. acute on chronic anemia  - anemia of chronic disease  - improved s/p 1 u prbc  no signs acute blood loss    3. acute on chronic diastolic CHf, b/l effusions  - start lasix IV  - Cardio f/u appreciated- echo pending to eval EF, valvular function    4. subacute SAH/SDH due to traumatic fall  w/ recent eval in Ed  - NSG f/u appreciated- hold aspirin x 2 weeks and f/u outpt    5. NEVA on CKD3  - monitor on lasix    6. elevated trops  - demand ischemia  - Cardio f/u appreciated- close monitoring    PT eval

## 2019-02-15 NOTE — CHART NOTE - NSCHARTNOTEFT_GEN_A_CORE
Called by nurse about pt agitated and on close monitoring     Will evaluate pt. Called by nurse about pt agitated and on close monitoring     Pt seen and evaluated. Pt restless in bed. When asked several times if he is in pain, no response given. As per nurse, pt gets agitated and begins swinging his arms intermittently.    Plan  -Close monitoring required  - Called by nurse about pt agitated and on close monitoring     Pt seen and evaluated. Pt restless in bed. When asked several times if he is in pain, no response given. As per nurse, pt gets agitated and begins swinging his arms intermittently.    Plan  -Close monitoring required- constant observation

## 2019-02-16 RX ORDER — OXYCODONE AND ACETAMINOPHEN 5; 325 MG/1; MG/1
1 TABLET ORAL ONCE
Qty: 0 | Refills: 0 | Status: DISCONTINUED | OUTPATIENT
Start: 2019-02-16 | End: 2019-02-16

## 2019-02-16 RX ORDER — LIDOCAINE 4 G/100G
1 CREAM TOPICAL DAILY
Qty: 0 | Refills: 0 | Status: DISCONTINUED | OUTPATIENT
Start: 2019-02-16 | End: 2019-02-17

## 2019-02-16 RX ORDER — OXYCODONE AND ACETAMINOPHEN 5; 325 MG/1; MG/1
1 TABLET ORAL EVERY 6 HOURS
Qty: 0 | Refills: 0 | Status: DISCONTINUED | OUTPATIENT
Start: 2019-02-16 | End: 2019-02-17

## 2019-02-16 RX ADMIN — PREGABALIN 1000 MICROGRAM(S): 225 CAPSULE ORAL at 11:55

## 2019-02-16 RX ADMIN — FINASTERIDE 5 MILLIGRAM(S): 5 TABLET, FILM COATED ORAL at 11:55

## 2019-02-16 RX ADMIN — Medication 25 MILLIGRAM(S): at 17:44

## 2019-02-16 RX ADMIN — LIDOCAINE 1 PATCH: 4 CREAM TOPICAL at 22:46

## 2019-02-16 RX ADMIN — Medication 1 APPLICATION(S): at 11:55

## 2019-02-16 RX ADMIN — Medication 1 APPLICATION(S): at 06:36

## 2019-02-16 RX ADMIN — Medication 75 MICROGRAM(S): at 06:37

## 2019-02-16 RX ADMIN — Medication 1 APPLICATION(S): at 17:44

## 2019-02-16 RX ADMIN — Medication 1000 UNIT(S): at 11:55

## 2019-02-16 RX ADMIN — RANOLAZINE 1000 MILLIGRAM(S): 500 TABLET, FILM COATED, EXTENDED RELEASE ORAL at 17:45

## 2019-02-16 RX ADMIN — Medication 25 MILLIGRAM(S): at 06:36

## 2019-02-16 RX ADMIN — RANOLAZINE 1000 MILLIGRAM(S): 500 TABLET, FILM COATED, EXTENDED RELEASE ORAL at 06:37

## 2019-02-16 RX ADMIN — AMLODIPINE BESYLATE 5 MILLIGRAM(S): 2.5 TABLET ORAL at 06:36

## 2019-02-16 RX ADMIN — LIDOCAINE 1 PATCH: 4 CREAM TOPICAL at 15:43

## 2019-02-16 RX ADMIN — Medication 25 MILLIGRAM(S): at 06:37

## 2019-02-16 RX ADMIN — OXYCODONE AND ACETAMINOPHEN 1 TABLET(S): 5; 325 TABLET ORAL at 06:37

## 2019-02-16 RX ADMIN — Medication 650 MILLIGRAM(S): at 14:33

## 2019-02-16 RX ADMIN — Medication 40 MILLIGRAM(S): at 06:36

## 2019-02-16 RX ADMIN — SERTRALINE 100 MILLIGRAM(S): 25 TABLET, FILM COATED ORAL at 11:55

## 2019-02-16 RX ADMIN — Medication 1 APPLICATION(S): at 22:48

## 2019-02-16 RX ADMIN — PANTOPRAZOLE SODIUM 40 MILLIGRAM(S): 20 TABLET, DELAYED RELEASE ORAL at 06:37

## 2019-02-16 NOTE — PROGRESS NOTE ADULT - SUBJECTIVE AND OBJECTIVE BOX
cc: confusion  HPI: 89y male w/ PMH Anemia, BPH, CKD stage III (baseline 1.5 in January) and HTN, diastolic CHF, mod to severe AS, DM who presented to the ED from [facility] for altered mental status. As per report patient fell on 2/11, was brought to the ED - he had a CT scan which showed a small SAH. He was discharged from the ED back to [facility] as there was no acute neurosurgical intervention was done. He returns today accompanied by his daughter with complaints of AMS. Repeat HEad CT demonstrates no significant changes. Further workup showed ANemia H/H 7.8/21.8 (s/p 1 unit PRBC)- CT chest showed Moderate CHF with mild interstitial/minimal alveolar pulmonary edema and small-moderate left and small right pleural effusions. Moderate bibasilar passive atelectasis. Patience received 1 U PRBC in the ED.      2/16- c/o left side pain - discussed w/ daughter at bedside, pain is from fall.      ros- as per hpi, otherwise 10 point ros negative      Vital Signs Last 24 Hrs  T(C): 36.3 (16 Feb 2019 11:20), Max: 36.9 (16 Feb 2019 04:59)  T(F): 97.4 (16 Feb 2019 11:20), Max: 98.4 (16 Feb 2019 04:59)  HR: 69 (16 Feb 2019 11:20) (60 - 81)  BP: 121/46 (16 Feb 2019 11:20) (93/63 - 148/53)  BP(mean): --  RR: 18 (16 Feb 2019 11:20) (18 - 18)  SpO2: 93% (16 Feb 2019 11:20) (91% - 93%)  T(C): 36.6 (15 Feb 2019 10:57), Max: 36.8 (14 Feb 2019 21:17)  T(F): 97.9 (15 Feb 2019 10:57), Max: 98.3 (14 Feb 2019 21:17)  HR: 60 (15 Feb 2019 10:57) (60 - 75)  BP: 124/46 (15 Feb 2019 10:57) (108/47 - 143/58)  BP(mean): 62 (15 Feb 2019 05:50) (62 - 62)  RR: 18 (15 Feb 2019 10:57) (18 - 20)  SpO2: 100% (15 Feb 2019 10:57) (95% - 100%)            LABS: All Labs Reviewed:                        9.1    8.44  )-----------( 163      ( 15 Feb 2019 05:53 )             27.5     02-15    142  |  107  |  31<H>  ----------------------------<  116<H>  3.7   |  31  |  1.24    Ca    8.2<L>      15 Feb 2019 05:53              PHYSICAL EXAM:  General: pleasant elderly male, NAD, comfortable- lying in bed- family at bedside   Neuro: AAOx3, forgetful, no focal deficits  HEENT: NCAT, poor dentition  Neck: Soft and supple  Respiratory: mild bibasilar rales   Cardiovascular: S1 and S2, RRR  Gastrointestinal: +BS, soft, NTND, no rebound/guarding  Extremities: chronic changes  Vascular: 2+ peripheral pulses  Musculoskeletal: moving all extremities  Skin: bandage LUE, hip, left ankle    < from: MR Head No Cont (02.13.19 @ 16:57) >  IMPRESSION:    moderate BILATERAL extra-axial fluid collections with   compression of the underlying hemispheres consistent with chronic   subdural hygromas or hematomas. Mild periventricular and deep white   matter ischemia is noted.      < end of copied text >    < from: CT Chest No Cont (02.13.19 @ 05:21) >  IMPRESSION:     CHEST:  No acute thoracic traumatic injury or fracture.    Moderate CHF with mild interstitial/minimal alveolar pulmonary edema and   small-moderate left and small right pleural effusions. Moderate bibasilar   passive atelectasis.    Moderate-severe aortic valve calcifications.    ABDOMINAL/PELVIS:  No acute abdominal/pelvic traumatic injury or fracture.    < end of copied text >      MEDICATIONS  (STANDING):  amLODIPine   Tablet 5 milliGRAM(s) Oral daily  atorvastatin 80 milliGRAM(s) Oral at bedtime  bethanechol 25 milliGRAM(s) Oral two times a day  cholecalciferol 1000 Unit(s) Oral daily  cyanocobalamin 1000 MICROGram(s) Oral daily  erythromycin   Ointment 1 Application(s) Both EYES four times a day  finasteride 5 milliGRAM(s) Oral daily  furosemide   Injectable 40 milliGRAM(s) IV Push daily  levothyroxine 75 MICROGram(s) Oral daily  lidocaine   Patch 1 Patch Transdermal daily  metoprolol tartrate 25 milliGRAM(s) Oral two times a day  oseltamivir 30 milliGRAM(s) Oral <User Schedule>  pantoprazole    Tablet 40 milliGRAM(s) Oral before breakfast  ranolazine 1000 milliGRAM(s) Oral two times a day  sertraline 100 milliGRAM(s) Oral daily    MEDICATIONS  (PRN):  acetaminophen   Tablet .. 650 milliGRAM(s) Oral every 6 hours PRN Temp greater or equal to 38C (100.4F), Mild Pain (1 - 3)  oxyCODONE    5 mG/acetaminophen 325 mG 1 Tablet(s) Oral every 6 hours PRN pain            Assessment/Plan    1. acute toxic metabolic encephalopathy  multifactorial due to fall/SAH/SDH/anemia    - resolved    2. acute on chronic anemia  - anemia of chronic disease  - improved s/p 1 u prbc  no signs acute blood loss    3. acute hypoxic respiratory failure, acute on chronic diastolic CHf, b/l effusions  O2 sats 81% on room air in Ed- improving w/ iv lasix and supplements O2 via NC  - start lasix IV  - Cardio f/u appreciated- echo EF50-55%, mod TR, severe Aortic stenosis - Cardio to follow up    4. subacute SAH/SDH due to traumatic fall  w/ recent eval in Ed  - NSG f/u appreciated- hold aspirin x 2 weeks and f/u outpt  - pain control-  imaging reviewed, no fractures on CT chest or abd/pelvis     5. NEVA on CKD3  improved w/ lasix    6. elevated trops  - demand ischemia  - Cardio f/u appreciated- close monitoring    - on flu px from [place]    PT eval appreciated- rec rehab facility.

## 2019-02-17 LAB — GLUCOSE BLDC GLUCOMTR-MCNC: 154 MG/DL — HIGH (ref 70–99)

## 2019-02-17 PROCEDURE — 71045 X-RAY EXAM CHEST 1 VIEW: CPT | Mod: 26

## 2019-02-17 RX ORDER — ROBINUL 0.2 MG/ML
0.1 INJECTION INTRAMUSCULAR; INTRAVENOUS ONCE
Qty: 0 | Refills: 0 | Status: DISCONTINUED | OUTPATIENT
Start: 2019-02-17 | End: 2019-02-17

## 2019-02-17 RX ORDER — ROBINUL 0.2 MG/ML
1 INJECTION INTRAMUSCULAR; INTRAVENOUS ONCE
Qty: 0 | Refills: 0 | Status: DISCONTINUED | OUTPATIENT
Start: 2019-02-17 | End: 2019-02-17

## 2019-02-17 RX ORDER — MORPHINE SULFATE 50 MG/1
1 CAPSULE, EXTENDED RELEASE ORAL EVERY 8 HOURS
Qty: 0 | Refills: 0 | Status: DISCONTINUED | OUTPATIENT
Start: 2019-02-17 | End: 2019-02-17

## 2019-02-17 RX ORDER — LIDOCAINE 4 G/100G
2 CREAM TOPICAL DAILY
Qty: 0 | Refills: 0 | Status: DISCONTINUED | OUTPATIENT
Start: 2019-02-17 | End: 2019-02-18

## 2019-02-17 RX ORDER — MORPHINE SULFATE 50 MG/1
1 CAPSULE, EXTENDED RELEASE ORAL EVERY 4 HOURS
Qty: 0 | Refills: 0 | Status: DISCONTINUED | OUTPATIENT
Start: 2019-02-17 | End: 2019-02-17

## 2019-02-17 RX ORDER — CEFEPIME 1 G/1
1000 INJECTION, POWDER, FOR SOLUTION INTRAMUSCULAR; INTRAVENOUS EVERY 12 HOURS
Qty: 0 | Refills: 0 | Status: DISCONTINUED | OUTPATIENT
Start: 2019-02-17 | End: 2019-02-17

## 2019-02-17 RX ORDER — ROBINUL 0.2 MG/ML
0.1 INJECTION INTRAMUSCULAR; INTRAVENOUS ONCE
Qty: 0 | Refills: 0 | Status: COMPLETED | OUTPATIENT
Start: 2019-02-17 | End: 2019-02-17

## 2019-02-17 RX ORDER — MORPHINE SULFATE 50 MG/1
1 CAPSULE, EXTENDED RELEASE ORAL ONCE
Qty: 0 | Refills: 0 | Status: DISCONTINUED | OUTPATIENT
Start: 2019-02-17 | End: 2019-02-17

## 2019-02-17 RX ORDER — FUROSEMIDE 40 MG
40 TABLET ORAL
Qty: 0 | Refills: 0 | Status: DISCONTINUED | OUTPATIENT
Start: 2019-02-17 | End: 2019-02-18

## 2019-02-17 RX ORDER — HALOPERIDOL DECANOATE 100 MG/ML
0.5 INJECTION INTRAMUSCULAR ONCE
Qty: 0 | Refills: 0 | Status: DISCONTINUED | OUTPATIENT
Start: 2019-02-17 | End: 2019-02-17

## 2019-02-17 RX ORDER — MORPHINE SULFATE 50 MG/1
1 CAPSULE, EXTENDED RELEASE ORAL
Qty: 0 | Refills: 0 | Status: DISCONTINUED | OUTPATIENT
Start: 2019-02-17 | End: 2019-02-18

## 2019-02-17 RX ORDER — CEFEPIME 1 G/1
1000 INJECTION, POWDER, FOR SOLUTION INTRAMUSCULAR; INTRAVENOUS EVERY 12 HOURS
Qty: 0 | Refills: 0 | Status: DISCONTINUED | OUTPATIENT
Start: 2019-02-17 | End: 2019-02-18

## 2019-02-17 RX ADMIN — AMLODIPINE BESYLATE 5 MILLIGRAM(S): 2.5 TABLET ORAL at 05:17

## 2019-02-17 RX ADMIN — Medication 25 MILLIGRAM(S): at 05:17

## 2019-02-17 RX ADMIN — Medication 75 MICROGRAM(S): at 05:17

## 2019-02-17 RX ADMIN — LIDOCAINE 2 PATCH: 4 CREAM TOPICAL at 09:30

## 2019-02-17 RX ADMIN — LIDOCAINE 2 PATCH: 4 CREAM TOPICAL at 19:50

## 2019-02-17 RX ADMIN — Medication 1 APPLICATION(S): at 17:55

## 2019-02-17 RX ADMIN — OXYCODONE AND ACETAMINOPHEN 1 TABLET(S): 5; 325 TABLET ORAL at 03:38

## 2019-02-17 RX ADMIN — Medication 1 APPLICATION(S): at 11:03

## 2019-02-17 RX ADMIN — MORPHINE SULFATE 1 MILLIGRAM(S): 50 CAPSULE, EXTENDED RELEASE ORAL at 11:03

## 2019-02-17 RX ADMIN — RANOLAZINE 1000 MILLIGRAM(S): 500 TABLET, FILM COATED, EXTENDED RELEASE ORAL at 05:15

## 2019-02-17 RX ADMIN — MORPHINE SULFATE 1 MILLIGRAM(S): 50 CAPSULE, EXTENDED RELEASE ORAL at 16:52

## 2019-02-17 RX ADMIN — Medication 1 APPLICATION(S): at 05:18

## 2019-02-17 RX ADMIN — MORPHINE SULFATE 1 MILLIGRAM(S): 50 CAPSULE, EXTENDED RELEASE ORAL at 11:15

## 2019-02-17 RX ADMIN — CEFEPIME 1000 MILLIGRAM(S): 1 INJECTION, POWDER, FOR SOLUTION INTRAMUSCULAR; INTRAVENOUS at 17:54

## 2019-02-17 RX ADMIN — MORPHINE SULFATE 1 MILLIGRAM(S): 50 CAPSULE, EXTENDED RELEASE ORAL at 23:04

## 2019-02-17 RX ADMIN — MORPHINE SULFATE 1 MILLIGRAM(S): 50 CAPSULE, EXTENDED RELEASE ORAL at 08:09

## 2019-02-17 RX ADMIN — ROBINUL 0.1 MILLIGRAM(S): 0.2 INJECTION INTRAMUSCULAR; INTRAVENOUS at 07:32

## 2019-02-17 RX ADMIN — MORPHINE SULFATE 1 MILLIGRAM(S): 50 CAPSULE, EXTENDED RELEASE ORAL at 13:15

## 2019-02-17 RX ADMIN — PANTOPRAZOLE SODIUM 40 MILLIGRAM(S): 20 TABLET, DELAYED RELEASE ORAL at 05:20

## 2019-02-17 RX ADMIN — Medication 40 MILLIGRAM(S): at 17:54

## 2019-02-17 RX ADMIN — MORPHINE SULFATE 1 MILLIGRAM(S): 50 CAPSULE, EXTENDED RELEASE ORAL at 13:05

## 2019-02-17 RX ADMIN — Medication 40 MILLIGRAM(S): at 05:17

## 2019-02-17 RX ADMIN — Medication 0.5 MILLIGRAM(S): at 17:53

## 2019-02-17 RX ADMIN — MORPHINE SULFATE 1 MILLIGRAM(S): 50 CAPSULE, EXTENDED RELEASE ORAL at 22:22

## 2019-02-17 NOTE — PROVIDER CONTACT NOTE (OTHER) - ASSESSMENT
Patient appears to respond well to repositioning, reminders to take deep breaths and reorienting. O2 saturation at 94% with venti mask in place. Unable to expectorate congestion despite chest PT and coughing/deep breathing.

## 2019-02-17 NOTE — PROVIDER CONTACT NOTE (OTHER) - ACTION/TREATMENT ORDERED:
No new orders at this time. MD is going to come to the unit to assess the patient. Will continue to monitor.

## 2019-02-17 NOTE — CHART NOTE - NSCHARTNOTEFT_GEN_A_CORE
Notified by RN that patient is having difficulty breathing with drop of saturation down to the 80's. Now at 94% on venturi mask. Patient evaluated at bedside. Appears confused and agitated with labored breathing. Per nurse patient normally confused as the day progresses. However pt is laying in bed scared that he is about to fall, which is new. 89 year old male w/ hx of CHF admitted for AMS s/p fall found to have anemia and b/l effusions.    Vital Signs (24 Hrs):  T(C): 36.6 (02-17-19 @ 04:16), Max: 36.9 (02-16-19 @ 04:59)  HR: 88 (02-17-19 @ 04:16) (69 - 88)  BP: 154/60 (02-17-19 @ 04:16) (121/46 - 154/60)  RR: 18 (02-17-19 @ 04:16) (18 - 18)  SpO2: 94% (02-17-19 @ 04:16) (93% - 94%)    Physical Exam:  General: agitated, alert to self only, NAD, reaching arms out as if about to fall  Heart: s1/s2+, RRR  Lungs: decreased breath sounds at the bases    A/P  89 year old male with acute on chronic resp failure likely due to CHF vs. PNA  - STAT CXR    D/w Lorie PGY3 and nursing staff Notified by RN that patient is having difficulty breathing with drop of saturation down to the 80's. Now at 94% on venturi mask. Patient evaluated at bedside. Appears confused and agitated with labored breathing. Per nurse patient normally confused as the day progresses. However pt is laying in bed scared that he is about to fall, which is new. 89 year old male w/ hx of CHF admitted for AMS s/p fall found to have anemia and b/l effusions.    Vital Signs (24 Hrs):  T(C): 36.6 (02-17-19 @ 04:16), Max: 36.9 (02-16-19 @ 04:59)  HR: 88 (02-17-19 @ 04:16) (69 - 88)  BP: 154/60 (02-17-19 @ 04:16) (121/46 - 154/60)  RR: 18 (02-17-19 @ 04:16) (18 - 18)  SpO2: 94% (02-17-19 @ 04:16) (93% - 94%)    Physical Exam:  General: agitated, alert to self only, NAD, reaching arms out as if about to fall  Heart: s1/s2+, RRR  Lungs: decreased breath sounds at the bases    A/P  89 year old male with acute on chronic resp failure likely due to CHF vs. PNA  - STAT CXR    D/w Dr. Melo PGY3 and nursing staff Notified by RN that patient is having difficulty breathing with drop of saturation down to the 80's. Now at 94% on venturi mask. Patient evaluated at bedside. Appears confused and agitated with labored breathing. Per nurse patient normally confused as the day progresses. However pt is laying in bed scared that he is about to fall, which is new. 89 year old male w/ hx of CHF admitted for AMS s/p fall found to have anemia and b/l effusions.    Vital Signs (24 Hrs):  T(C): 36.6 (02-17-19 @ 04:16), Max: 36.9 (02-16-19 @ 04:59)  HR: 88 (02-17-19 @ 04:16) (69 - 88)  BP: 154/60 (02-17-19 @ 04:16) (121/46 - 154/60)  RR: 18 (02-17-19 @ 04:16) (18 - 18)  SpO2: 94% (02-17-19 @ 04:16) (93% - 94%)    Physical Exam:  General: agitated, alert to self only, NAD, reaching arms out as if about to fall  Heart: s1/s2+, RRR  Lungs: decreased breath sounds at the bases    A/P  89 year old male with acute on chronic resp failure likely due to CHF vs. PNA  - STAT CXR-> pulmonary edema  - will give standing order lasix 40 mg IV push early  - continue venturi mask  - should consider robinul for secretions and morphine for air hunger   - will sign out to primary team    D/w Dr. Melo PGY3 and nursing staff

## 2019-02-17 NOTE — CHART NOTE - NSCHARTNOTEFT_GEN_A_CORE
Rapid response called due to AMS. Per nurse patient less responsive. Pt evaluated at bedside saturating in the 70's on venturi mask. Pt evaluated previously at that time CXR was done which showed pulmonary congestion and 40 mg lasix IV push was given. Patient evaluated at bedside seems lethargic, but responsive to commands. Saturation improved to mid 80's on non rebreather mask.     Vitals:  Temp: 98.3  BP: 94/45  HR: 69  RR: 21  SpO2 86% on non rebreather mask.       Physical Exam:  General: lethargic, responsive to commands, pale  Heart: s1/s2+, RRR  Lungs: decreased breath sounds at the bases; no resp distress    A/P:    89 year old male with hypoxic resp failure and AMS  - continue non rebreather mask  - will start robinul   - will sign out to primary team  - DNR/DNI  - reached out to family with no answer    D/w Dr. Melo PGY 3, Dr. Donnelly Rapid response called due to AMS. Per nurse patient less responsive. Pt evaluated at bedside saturating in the 70's on venturi mask. Pt evaluated previously at that time CXR was done which showed pulmonary congestion and 40 mg lasix IV push was given. Patient evaluated at bedside seems lethargic, but responsive to commands. Saturation improved to mid 80's on non rebreather mask.     Vitals:  Temp: 98.3  BP: 94/45  HR: 69  RR: 21  SpO2 86% on non rebreather mask.       Physical Exam:  General: lethargic, responsive to commands, pale  Heart: s1/s2+, RRR  Lungs: decreased breath sounds at the bases; no resp distress    A/P:    89 year old male with hypoxic resp failure and AMS  - continue non rebreather mask  - will start robinul   - will sign out to primary team  - DNR/DNI  - family is at bedside    D/w Dr. Melo PGY 3, Dr. Donnelly

## 2019-02-17 NOTE — PROGRESS NOTE ADULT - SUBJECTIVE AND OBJECTIVE BOX
cc: confusion  HPI: 89y male w/ PMH Anemia, BPH, CKD stage III (baseline 1.5 in January) and HTN, diastolic CHF, mod to severe AS, DM who presented to the ED from [facility] for altered mental status. As per report patient fell on 2/11, was brought to the ED - he had a CT scan which showed a small SAH. He was discharged from the ED back to [facility] as there was no acute neurosurgical intervention was done. He returns today accompanied by his daughter with complaints of AMS. Repeat HEad CT demonstrates no significant changes. Further workup showed ANemia H/H 7.8/21.8 (s/p 1 unit PRBC)- CT chest showed Moderate CHF with mild interstitial/minimal alveolar pulmonary edema and small-moderate left and small right pleural effusions. Moderate bibasilar passive atelectasis. Patience received 1 U PRBC in the ED.      2/16- c/o left side pain - discussed w/ daughter at bedside, pain is from fall.    2/17- overnight events noted.  Discussed w/ daughter at bedside this morning- states she wants him to be comfortable and wants to continue current tx w/ lasix and only morphine if really needed.  Currently pt SaO2 100% on NRB.    ros- as per hpi, otherwise 10 point ros negative      Vital Signs Last 24 Hrs  T(C): 36.6 (17 Feb 2019 04:16), Max: 36.6 (17 Feb 2019 04:16)  T(F): 97.9 (17 Feb 2019 04:16), Max: 97.9 (17 Feb 2019 04:16)  HR: 88 (17 Feb 2019 04:16) (69 - 88)  BP: 154/60 (17 Feb 2019 04:16) (121/46 - 154/60)  BP(mean): --  RR: 18 (17 Feb 2019 04:16) (18 - 18)  SpO2: 94% (17 Feb 2019 04:16) (93% - 94%)  T(C): 36.3 (16 Feb 2019 11:20), Max: 36.9 (16 Feb 2019 04:59)  T(F): 97.4 (16 Feb 2019 11:20), Max: 98.4 (16 Feb 2019 04:59)  HR: 69 (16 Feb 2019 11:20) (60 - 81)  BP: 121/46 (16 Feb 2019 11:20) (93/63 - 148/53)        LABS: All Labs Reviewed:                        9.1    8.44  )-----------( 163      ( 15 Feb 2019 05:53 )             27.5     02-15    142  |  107  |  31<H>  ----------------------------<  116<H>  3.7   |  31  |  1.24    Ca    8.2<L>      15 Feb 2019 05:53              PHYSICAL EXAM:  General: ill appearing elderly male, comfortable on NRB- daughter at bedside  Neuro: lethargic  HEENT: on NRB  Neck: Soft and supple  Respiratory: bibasilar rales   Cardiovascular: S1 and S2, RRR  Gastrointestinal: +BS, soft, NTND  Extremities: chronic changes  Vascular: 2+ peripheral pulses  Skin: bandage LUE, hip, left ankle    < from: MR Head No Cont (02.13.19 @ 16:57) >  IMPRESSION:    moderate BILATERAL extra-axial fluid collections with   compression of the underlying hemispheres consistent with chronic   subdural hygromas or hematomas. Mild periventricular and deep white   matter ischemia is noted.      < end of copied text >        < from: CT Chest No Cont (02.13.19 @ 05:21) >  IMPRESSION:     CHEST:  No acute thoracic traumatic injury or fracture.    Moderate CHF with mild interstitial/minimal alveolar pulmonary edema and   small-moderate left and small right pleural effusions. Moderate bibasilar   passive atelectasis.    Moderate-severe aortic valve calcifications.    ABDOMINAL/PELVIS:  No acute abdominal/pelvic traumatic injury or fracture.    < end of copied text >      MEDICATIONS  (STANDING):  amLODIPine   Tablet 5 milliGRAM(s) Oral daily  atorvastatin 80 milliGRAM(s) Oral at bedtime  bethanechol 25 milliGRAM(s) Oral two times a day  cholecalciferol 1000 Unit(s) Oral daily  cyanocobalamin 1000 MICROGram(s) Oral daily  erythromycin   Ointment 1 Application(s) Both EYES four times a day  finasteride 5 milliGRAM(s) Oral daily  furosemide   Injectable 40 milliGRAM(s) IV Push two times a day  levothyroxine 75 MICROGram(s) Oral daily  lidocaine   Patch 2 Patch Transdermal daily  metoprolol tartrate 25 milliGRAM(s) Oral two times a day  oseltamivir 30 milliGRAM(s) Oral <User Schedule>  pantoprazole    Tablet 40 milliGRAM(s) Oral before breakfast  ranolazine 1000 milliGRAM(s) Oral two times a day  sertraline 100 milliGRAM(s) Oral daily    MEDICATIONS  (PRN):  acetaminophen   Tablet .. 650 milliGRAM(s) Oral every 6 hours PRN Temp greater or equal to 38C (100.4F), Mild Pain (1 - 3)  morphine  - Injectable 1 milliGRAM(s) IV Push every 8 hours PRN pain or respiratory distress              Assessment/Plan    1. acute toxic metabolic encephalopathy  multifactorial due to fall/SAH/SDH/anemia    -waxing and waning    2. acute on chronic anemia  - anemia of chronic disease  - improved s/p 1 u prbc  no signs acute blood loss    3. acute hypoxic respiratory failure, acute on chronic diastolic CHf, b/l effusions  O2 sats 81% on room air in Ed- improving w/ iv lasix and supplements O2 via NC  - start lasix IV  - Cardio f/u appreciated- echo EF50-55%, mod TR, severe Aortic stenosis - Cardio to follow up  2/17- hypoxic overnight, now on NRB, continuous O2 monitoring, increase lasix, morphine prn,  Cardio eval -daughter requesting Dr. Ordoñez/Maura    4. subacute SAH/SDH due to traumatic fall  w/ recent eval in Ed  - NSG f/u appreciated- hold aspirin x 2 weeks and f/u outpt  - pain control-  imaging reviewed, no fractures on CT chest or abd/pelvis     5. NEVA on CKD3  improved w/ lasix    6. elevated trops  - demand ischemia  - Cardio f/u appreciated- close monitoring    - on flu px from [facility]    PT eval appreciated- rec rehab facility.      Advanced directives discussed- MOLST. DNR/I.  Continue current management.

## 2019-02-17 NOTE — PROVIDER CONTACT NOTE (OTHER) - SITUATION
Patient observed restless and stiffening up body. O2 saturation was 80% with O2 via NC increased to 4mL. New onset persistent gurgling breathing/cough also present throughout the night.

## 2019-02-17 NOTE — PROVIDER CONTACT NOTE (OTHER) - BACKGROUND
Patient admitted for anemia. Presented to the ED with AMS s/p mechanical fall on 2/11. Patient presents with increased confusion at night and is on a 1:1 for safety.

## 2019-02-18 VITALS
DIASTOLIC BLOOD PRESSURE: 79 MMHG | TEMPERATURE: 99 F | OXYGEN SATURATION: 84 % | HEART RATE: 103 BPM | SYSTOLIC BLOOD PRESSURE: 104 MMHG | WEIGHT: 93.04 LBS

## 2019-02-18 RX ADMIN — Medication 1 APPLICATION(S): at 00:05

## 2019-02-18 RX ADMIN — Medication 40 MILLIGRAM(S): at 05:21

## 2019-02-18 RX ADMIN — Medication 1 APPLICATION(S): at 05:22

## 2019-02-18 RX ADMIN — CEFEPIME 1000 MILLIGRAM(S): 1 INJECTION, POWDER, FOR SOLUTION INTRAMUSCULAR; INTRAVENOUS at 06:08

## 2019-02-18 RX ADMIN — Medication 0.5 MILLIGRAM(S): at 00:04

## 2019-02-18 RX ADMIN — MORPHINE SULFATE 1 MILLIGRAM(S): 50 CAPSULE, EXTENDED RELEASE ORAL at 05:21

## 2019-02-18 RX ADMIN — Medication 0.5 MILLIGRAM(S): at 06:50

## 2019-02-18 NOTE — DISCHARGE NOTE FOR THE EXPIRED PATIENT - NS PATIENT DEATH CRITERIA
Family refused Patient is dead based on Cardiopulmonary criteria including absent breath sounds, pulselessness and/or asystole

## 2019-02-18 NOTE — PROGRESS NOTE ADULT - REASON FOR ADMISSION
altered mental status
Change in MS
altered mental status

## 2019-02-18 NOTE — PROGRESS NOTE ADULT - SUBJECTIVE AND OBJECTIVE BOX
cc: confusion  HPI: 89y male w/ PMH Anemia, BPH, CKD stage III (baseline 1.5 in January) and HTN, diastolic CHF, mod to severe AS, DM who presented to the ED from [facility] for altered mental status. As per report patient fell on 2/11, was brought to the ED - he had a CT scan which showed a small SAH. He was discharged from the ED back to [facility] as there was no acute neurosurgical intervention was done. He returns today accompanied by his daughter with complaints of AMS. Repeat HEad CT demonstrates no significant changes. Further workup showed ANemia H/H 7.8/21.8 (s/p 1 unit PRBC)- CT chest showed Moderate CHF with mild interstitial/minimal alveolar pulmonary edema and small-moderate left and small right pleural effusions. Moderate bibasilar passive atelectasis. Patience received 1 U PRBC in the ED.      2/16- c/o left side pain - discussed w/ daughter at bedside, pain is from fall.    2/17- overnight events noted.  Discussed w/ daughter at bedside this morning- states she wants him to be comfortable and wants to continue current tx w/ lasix and only morphine if really needed.  Currently pt SaO2 100% on NRB.  2/18-    ros- as per hpi, otherwise 10 point ros negative        Vital Signs Last 24 Hrs  T(C): 37.2 (18 Feb 2019 04:40), Max: 37.2 (18 Feb 2019 04:40)  T(F): 98.9 (18 Feb 2019 04:40), Max: 98.9 (18 Feb 2019 04:40)  HR: 103 (18 Feb 2019 04:40) (60 - 103)  BP: 104/79 (18 Feb 2019 04:40) (104/79 - 108/48)  BP(mean): --  RR: 20 (17 Feb 2019 22:15) (16 - 20)  SpO2: 84% (18 Feb 2019 04:40) (84% - 96%)  on __  T(C): 36.6 (17 Feb 2019 04:16), Max: 36.6 (17 Feb 2019 04:16)      LABS: All Labs Reviewed:                        9.1    8.44  )-----------( 163      ( 15 Feb 2019 05:53 )             27.5     02-15    142  |  107  |  31<H>  ----------------------------<  116<H>  3.7   |  31  |  1.24    Ca    8.2<L>      15 Feb 2019 05:53              PHYSICAL EXAM:  General: ill appearing elderly male, comfortable on NRB- daughter at bedside  Neuro: lethargic  HEENT: on NRB  Neck: Soft and supple  Respiratory: bibasilar rales   Cardiovascular: S1 and S2, RRR  Gastrointestinal: +BS, soft, NTND  Extremities: chronic changes  Vascular: 2+ peripheral pulses  Skin: bandage LUE, hip, left ankle    < from: MR Head No Cont (02.13.19 @ 16:57) >  IMPRESSION:    moderate BILATERAL extra-axial fluid collections with   compression of the underlying hemispheres consistent with chronic   subdural hygromas or hematomas. Mild periventricular and deep white   matter ischemia is noted.      < end of copied text >        < from: CT Chest No Cont (02.13.19 @ 05:21) >  IMPRESSION:     CHEST:  No acute thoracic traumatic injury or fracture.    Moderate CHF with mild interstitial/minimal alveolar pulmonary edema and   small-moderate left and small right pleural effusions. Moderate bibasilar   passive atelectasis.    Moderate-severe aortic valve calcifications.    ABDOMINAL/PELVIS:  No acute abdominal/pelvic traumatic injury or fracture.    < end of copied text >      MEDICATIONS  (STANDING):  amLODIPine   Tablet 5 milliGRAM(s) Oral daily  atorvastatin 80 milliGRAM(s) Oral at bedtime  bethanechol 25 milliGRAM(s) Oral two times a day  cefepime  Injectable. 1000 milliGRAM(s) IV Push every 12 hours  cholecalciferol 1000 Unit(s) Oral daily  cyanocobalamin 1000 MICROGram(s) Oral daily  erythromycin   Ointment 1 Application(s) Both EYES four times a day  finasteride 5 milliGRAM(s) Oral daily  furosemide   Injectable 40 milliGRAM(s) IV Push two times a day  levothyroxine 75 MICROGram(s) Oral daily  lidocaine   Patch 2 Patch Transdermal daily  metoprolol tartrate 25 milliGRAM(s) Oral two times a day  oseltamivir 30 milliGRAM(s) Oral <User Schedule>  pantoprazole    Tablet 40 milliGRAM(s) Oral before breakfast  ranolazine 1000 milliGRAM(s) Oral two times a day  sertraline 100 milliGRAM(s) Oral daily    MEDICATIONS  (PRN):  acetaminophen   Tablet .. 650 milliGRAM(s) Oral every 6 hours PRN Temp greater or equal to 38C (100.4F), Mild Pain (1 - 3)  LORazepam   Injectable 0.5 milliGRAM(s) IV Push every 4 hours PRN Agitation  morphine  - Injectable 1 milliGRAM(s) IV Push every 3 hours PRN pain, dyspnea              Assessment/Plan    1. acute toxic metabolic encephalopathy  multifactorial due to fall/SAH/SDH/anemia  and now respiratory failure/hcap  -waxing and waning    2. acute on chronic anemia  - anemia of chronic disease  - improved s/p 1 u prbc  no signs acute blood loss  2/18- cbc pending    3. acute hypoxic respiratory failure multifactorial due to  acute on chronic diastolic CHf, b/l effusions, severe Aortic stenosis and HCAP (suspect Gram neg. organisms)  O2 sats 81% on room air in Ed- improving w/ iv lasix and supplements O2 via NC  - Cardio f/u appreciated- echo EF50-55%, mod TR, severe Aortic stenosis   2/17- hypoxic overnight, now on NRB, continuous O2 monitoring, increase lasix, morphine prn,  Cardio eval -daughter requesting Dr. Ordoñez/Maura  2/18- cxr small effusions, b/l lower lung consolidation- started cefepime yesterday.  FAmily requesting increased morphine.    4. subacute SAH/SDH due to traumatic fall  w/ recent eval in Ed  - NSG f/u appreciated- hold aspirin x 2 weeks and f/u outpt  - pain control-  imaging reviewed, no fractures on CT chest or abd/pelvis     5. NEVA on CKD3  improved w/ lasix    6. elevated trops  - demand ischemia  - Cardio f/u appreciated    - on flu px from [facility]    PT eval appreciated- rec rehab facility.      Advanced directives discussed- MOLST. DNR/I.  Family understands poor prognosis.  Wants him to be comfortable but also to continue current management. cc: confusion  HPI: 89y male w/ PMH Anemia, BPH, CKD stage III (baseline 1.5 in January) and HTN, diastolic CHF, mod to severe AS, DM who presented to the ED from [facility] for altered mental status. As per report patient fell on 2/11, was brought to the ED - he had a CT scan which showed a small SAH. He was discharged from the ED back to [facility] as there was no acute neurosurgical intervention was done. He returns today accompanied by his daughter with complaints of AMS. Repeat HEad CT demonstrates no significant changes. Further workup showed ANemia H/H 7.8/21.8 (s/p 1 unit PRBC)- CT chest showed Moderate CHF with mild interstitial/minimal alveolar pulmonary edema and small-moderate left and small right pleural effusions. Moderate bibasilar passive atelectasis. Patience received 1 U PRBC in the ED.      2/16- c/o left side pain - discussed w/ daughter at bedside, pain is from fall.    2/17- overnight events noted.  Discussed w/ daughter at bedside this morning- states she wants him to be comfortable and wants to continue current tx w/ lasix and only morphine if really needed.  Currently pt SaO2 100% on NRB.  2/18-lethargic, not arousable on mask.    ros- as per hpi, otherwise 10 point ros negative        Vital Signs Last 24 Hrs  T(C): 37.2 (18 Feb 2019 04:40), Max: 37.2 (18 Feb 2019 04:40)  T(F): 98.9 (18 Feb 2019 04:40), Max: 98.9 (18 Feb 2019 04:40)  HR: 103 (18 Feb 2019 04:40) (60 - 103)  BP: 104/79 (18 Feb 2019 04:40) (104/79 - 108/48)  BP(mean): --  RR: 20 (17 Feb 2019 22:15) (16 - 20)  SpO2: 84% (18 Feb 2019 04:40) (84% - 96%)  on __  T(C): 36.6 (17 Feb 2019 04:16), Max: 36.6 (17 Feb 2019 04:16)      LABS: All Labs Reviewed:                        9.1    8.44  )-----------( 163      ( 15 Feb 2019 05:53 )             27.5     02-15    142  |  107  |  31<H>  ----------------------------<  116<H>  3.7   |  31  |  1.24    Ca    8.2<L>      15 Feb 2019 05:53              PHYSICAL EXAM:  General: ill appearing elderly male  Neuro: lethargic  HEENT: on NRB  Neck: Soft and supple  Respiratory: bibasilar rales   Cardiovascular: S1 and S2, RRR  Gastrointestinal: +BS  Extremities: chronic changes  Vascular: 2+ peripheral pulses  Skin: bandage LUE, hip, left ankle    < from: MR Head No Cont (02.13.19 @ 16:57) >  IMPRESSION:    moderate BILATERAL extra-axial fluid collections with   compression of the underlying hemispheres consistent with chronic   subdural hygromas or hematomas. Mild periventricular and deep white   matter ischemia is noted.      < end of copied text >        < from: CT Chest No Cont (02.13.19 @ 05:21) >  IMPRESSION:     CHEST:  No acute thoracic traumatic injury or fracture.    Moderate CHF with mild interstitial/minimal alveolar pulmonary edema and   small-moderate left and small right pleural effusions. Moderate bibasilar   passive atelectasis.    Moderate-severe aortic valve calcifications.    ABDOMINAL/PELVIS:  No acute abdominal/pelvic traumatic injury or fracture.    < end of copied text >      MEDICATIONS  (STANDING):  amLODIPine   Tablet 5 milliGRAM(s) Oral daily  atorvastatin 80 milliGRAM(s) Oral at bedtime  bethanechol 25 milliGRAM(s) Oral two times a day  cefepime  Injectable. 1000 milliGRAM(s) IV Push every 12 hours  cholecalciferol 1000 Unit(s) Oral daily  cyanocobalamin 1000 MICROGram(s) Oral daily  erythromycin   Ointment 1 Application(s) Both EYES four times a day  finasteride 5 milliGRAM(s) Oral daily  furosemide   Injectable 40 milliGRAM(s) IV Push two times a day  levothyroxine 75 MICROGram(s) Oral daily  lidocaine   Patch 2 Patch Transdermal daily  metoprolol tartrate 25 milliGRAM(s) Oral two times a day  oseltamivir 30 milliGRAM(s) Oral <User Schedule>  pantoprazole    Tablet 40 milliGRAM(s) Oral before breakfast  ranolazine 1000 milliGRAM(s) Oral two times a day  sertraline 100 milliGRAM(s) Oral daily    MEDICATIONS  (PRN):  acetaminophen   Tablet .. 650 milliGRAM(s) Oral every 6 hours PRN Temp greater or equal to 38C (100.4F), Mild Pain (1 - 3)  LORazepam   Injectable 0.5 milliGRAM(s) IV Push every 4 hours PRN Agitation  morphine  - Injectable 1 milliGRAM(s) IV Push every 3 hours PRN pain, dyspnea              Assessment/Plan    1. acute toxic metabolic encephalopathy  multifactorial due to fall/SAH/SDH/anemia  and now respiratory failure/hcap  -waxing and waning    2. acute on chronic anemia  - anemia of chronic disease  - improved s/p 1 u prbc  no signs acute blood loss  2/18- cbc pending    3. acute hypoxic respiratory failure multifactorial due to  acute on chronic diastolic CHf, b/l effusions, severe Aortic stenosis and HCAP (suspect Gram neg. organisms)  O2 sats 81% on room air in Ed- improving w/ iv lasix and supplements O2 via NC  - Cardio f/u appreciated- echo EF50-55%, mod TR, severe Aortic stenosis   2/17- hypoxic overnight, now on NRB, continuous O2 monitoring, increase lasix, morphine prn,  Cardio eval -daughter requesting Dr. Ordoñez/Maura  2/18- cxr small effusions, b/l lower lung consolidation- started cefepime yesterday.  FAmily requesting increased morphine.    4. subacute SAH/SDH due to traumatic fall  w/ recent eval in Ed  - NSG f/u appreciated- hold aspirin x 2 weeks and f/u outpt  - pain control-  imaging reviewed, no fractures on CT chest or abd/pelvis     5. NEVA on CKD3  improved w/ lasix    6. elevated trops  - demand ischemia  - Cardio f/u appreciated    - on flu px from [facility]    PT eval appreciated- rec rehab facility.      Advanced directives discussed- MOLST. DNR/I.  Family understands poor prognosis.  Wants him to be comfortable but also to continue current management.

## 2019-02-18 NOTE — DISCHARGE NOTE FOR THE EXPIRED PATIENT - HOSPITAL COURSE
1. acute toxic metabolic encephalopathy  multifactorial due to fall/SAH/SDH/anemia  and now respiratory failure/hcap  -waxing and waning    2. acute on chronic anemia  - anemia of chronic disease  - improved s/p 1 u prbc  no signs acute blood loss  2/18- cbc pending    3. acute hypoxic respiratory failure multifactorial due to  acute on chronic diastolic CHf, b/l effusions, severe Aortic stenosis and HCAP (suspect Gram neg. organisms)  O2 sats 81% on room air in Ed- improving w/ iv lasix and supplements O2 via NC  - Cardio f/u appreciated- echo EF50-55%, mod TR, severe Aortic stenosis   2/17- hypoxic overnight, now on NRB, continuous O2 monitoring, increase lasix, morphine prn,  Cardio eval -daughter requesting Dr. Ordoñez/Maura  2/18- cxr small effusions, b/l lower lung consolidation- started cefepime yesterday.  FAmily requesting increased morphine.    4. subacute SAH/SDH due to traumatic fall  w/ recent eval in Ed  - NSG f/u appreciated- hold aspirin x 2 weeks and f/u outpt  - pain control-  imaging reviewed, no fractures on CT chest or abd/pelvis     5. NEVA on CKD3  improved w/ lasix    6. elevated trops  - demand ischemia  - Cardio f/u appreciated    - on flu px from [facility]    PT eval appreciated- rec rehab facility.      Advanced directives discussed- MOLST. DNR/I.  Family understands poor prognosis.  Wants him to be comfortable but also to continue current management.

## 2019-02-20 RX ORDER — ASPIRIN/CALCIUM CARB/MAGNESIUM 324 MG
1 TABLET ORAL
Qty: 0 | Refills: 0 | COMMUNITY

## 2019-02-20 RX ORDER — ALPRAZOLAM 0.25 MG
1 TABLET ORAL
Qty: 0 | Refills: 0 | COMMUNITY

## 2019-02-20 RX ORDER — ACETAMINOPHEN 500 MG
2 TABLET ORAL
Qty: 0 | Refills: 0 | COMMUNITY

## 2019-02-20 RX ORDER — METOPROLOL TARTRATE 50 MG
1 TABLET ORAL
Qty: 0 | Refills: 0 | COMMUNITY

## 2019-02-20 RX ORDER — PANTOPRAZOLE SODIUM 20 MG/1
1 TABLET, DELAYED RELEASE ORAL
Qty: 0 | Refills: 0 | COMMUNITY

## 2019-02-20 RX ORDER — CHOLECALCIFEROL (VITAMIN D3) 125 MCG
1 CAPSULE ORAL
Qty: 0 | Refills: 0 | COMMUNITY

## 2019-02-20 RX ORDER — ERYTHROMYCIN BASE 5 MG/GRAM
1 OINTMENT (GRAM) OPHTHALMIC (EYE)
Qty: 0 | Refills: 0 | COMMUNITY

## 2019-02-20 RX ORDER — PREGABALIN 225 MG/1
1 CAPSULE ORAL
Qty: 0 | Refills: 0 | COMMUNITY

## 2019-02-20 RX ORDER — FINASTERIDE 5 MG/1
1 TABLET, FILM COATED ORAL
Qty: 0 | Refills: 0 | COMMUNITY

## 2019-02-20 RX ORDER — LEVOTHYROXINE SODIUM 125 MCG
1 TABLET ORAL
Qty: 0 | Refills: 0 | COMMUNITY

## 2019-02-20 RX ORDER — SERTRALINE 25 MG/1
1 TABLET, FILM COATED ORAL
Qty: 0 | Refills: 0 | COMMUNITY

## 2019-02-20 RX ORDER — ATORVASTATIN CALCIUM 80 MG/1
1 TABLET, FILM COATED ORAL
Qty: 0 | Refills: 0 | COMMUNITY

## 2019-02-20 RX ORDER — AMLODIPINE BESYLATE 2.5 MG/1
1 TABLET ORAL
Qty: 0 | Refills: 0 | COMMUNITY

## 2019-02-20 RX ORDER — BETHANECHOL CHLORIDE 25 MG
1 TABLET ORAL
Qty: 0 | Refills: 0 | COMMUNITY

## 2019-02-20 RX ORDER — AZELASTINE 137 UG/1
2 SPRAY, METERED NASAL
Qty: 0 | Refills: 0 | COMMUNITY

## 2019-02-20 RX ORDER — RANOLAZINE 500 MG/1
1 TABLET, FILM COATED, EXTENDED RELEASE ORAL
Qty: 0 | Refills: 0 | COMMUNITY

## 2019-02-20 RX ORDER — LOTEPREDNOL ETABONATE 2 MG/ML
1 SUSPENSION/ DROPS OPHTHALMIC
Qty: 0 | Refills: 0 | COMMUNITY

## 2019-02-20 RX ORDER — CLOPIDOGREL BISULFATE 75 MG/1
1 TABLET, FILM COATED ORAL
Qty: 0 | Refills: 0 | COMMUNITY

## 2019-03-12 ENCOUNTER — RX RENEWAL (OUTPATIENT)
Age: 84
End: 2019-03-12

## 2019-03-12 DIAGNOSIS — N40.1 BENIGN PROSTATIC HYPERPLASIA WITH LOWER URINARY TRACT SYMPMS: ICD-10-CM

## 2019-03-12 DIAGNOSIS — N13.8 BENIGN PROSTATIC HYPERPLASIA WITH LOWER URINARY TRACT SYMPMS: ICD-10-CM

## 2019-06-09 PROBLEM — I51.9 HEART DISEASE: Status: ACTIVE | Noted: 2017-02-01

## 2020-03-20 NOTE — PATIENT PROFILE ADULT - NSPROHMSYMPCOND_GEN_A_NUR
Patient notified that procedure was canceled at her request. Patient will call back to schedule.   MANFRED 2/2 cognitive status

## 2022-01-06 NOTE — H&P ADULT - NSHPROSALLOTHERNEGRD_GEN_ALL_CORE
Bill,     Your blood pressure is higher than desirable. Your heart and lungs sound good.     I am suspicious that you have under treated sleep apnea. That can cause your red cell count to be high and make you drowsy during the day. I recommend seeing Dr. Uziel Vitale ENT at [facility]. [phone].     You received a flu vaccine today.     We will check your A1C, hemoglobin, urine test, and Celiac test. If your A1C is elevated I will have you resume Metformin at a higher dose.     Please see me in 6 months for a recheck, July 11 at 8:30 AM.           
All other review of systems negative, except as noted in HPI

## 2022-04-04 NOTE — PROGRESS NOTE ADULT - ASSESSMENT
TIA  SATISH stenosis  Severe AS  PAF  Ac on chronic kidney disease.   HTN  HLD    Suggest:    Advanced age pt with known h/o severe AS and severe SATISH stenosis. Pt had wanted conservative care, refuse invasive treatment in the past.   Will suggest medical management.  Hydrate  Follow renal function  Minimally elevated Trop-I. No chest pain. Will follow up.   Echo  Neuro work up  Conservative cardiac care given advanced age, severe AS. Pt and family wishes. TIA  SATISH stenosis  Severe AS  PAF/flutter  Ac on chronic kidney disease.   HTN  HLD    Suggest:    Advanced age pt with known h/o severe AS and severe SATISH stenosis. Pt had wanted conservative care, refuse invasive treatment in the past. Will suggest medical management.  Short PAFl. Low dose beta blockers. Not a candidate for anticoagulation.  Hydrate  Follow renal function  Minimally elevated Trop-I. No chest pain. Will follow up.   Echo  Neuro work up  Conservative cardiac care given advanced age, severe AS. Pt and family wishes.  Dr Lorenzo will follow up in AM The patient is a 88y Female complaining of fall.

## 2022-07-07 NOTE — ED PROVIDER NOTE - HEME LYMPH, MLM
Initial /CM Assessment/Plan of Care Note     Baseline Assessment  70 year old admitted 7/6/2022 as Inpatient with a diagnosis of sepsis.   Prior to admission patient was living with Spouse/significant other and residing at House.  Patient does have a Power of  for Healthcare. Patient’s Primary Care Provider is Dolores Francisco DO.     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 Zacarias Postop GI bleeding 3/2/21 EGD :EGJ ulcer. 6/24/22 MRI: No abnormal intra or extrahepatic biliary ductal dilatation.   • Hashimoto's disease    • Herpes simplex vulvovaginitis 12/6/2012   • Hiatal hernia    • History of blood transfusion    • History of endometrial hyperplasia 12/04/2019    Treated and resolved   • History of pulmonary embolism 03/08/2021   • Hx Esophageal ulcer with bleeding 2021    surgical stress ulcer.Postop GI bleeding 3/2/21 EGD :EGJ ulcer.    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    Ampulla of Vater 3-4 cm polyp   • Polyp of stomach and duodenum 12/02/2020   • PONV (postoperative nausea and vomiting)        Prior to Admission Status  Functional Status  Ambulation: Independent/Self  Bathing: Independent/Self  Dressing: Independent/Self  Toileting: Independent/Self  Meal Preparation: Independent/Self  Shopping: Independent/Self  Medication Preparation: Independent/Self  Medication Administration: Independent/Self  Housekeeping: Independent/Self  Laundry: Independent/Self  Transportation: Independent/Self    Agency/Support  Type of Services Prior to Hospitalization: None  Support Systems: Spouse/Significant other  Home Devices/Equipment: None  Mobility Assist Devices: None  Sensory Support Devices: Eyeglasses    Current Status  PT Ambulation Tips: Ambulation with therapy only  PT Transfer Tips: Use gait belt, Needs minimal assist   OT Bathing Tips: Bathe at sink side, Bathes with supervision  OT Dressing Tips: Dresses with minimal assist  OT Toileting Tips: Toilets with minimal assist  OT Feeding Tips: Needs setup  SLP Swallow/Feeding Tips:    SLP Comm/Cog Tips:    Current Mental Status: Alert, Oriented to Person, Oriented to Place, Oriented to Reason for Hospitalization, Oriented to Time  Stressors:      Insurance  Primary: MEDICARE  Secondary: ANTHEM/BCBS    Barriers to Discharge  Identified Barriers to Discharge/Transition Planning: Unresolved medical issues    Progress Note  Pt was admitted with chills and found to have sepsis with liver mass of some type. PT iso n IV abx x2 and Levophed gtt is now on hold. Tumor markers pending. Pt may have IR needs, await further information from ID/pulm/GI. Pt is from home with spouse and nancy Fitzpatrick RN no skilled discharge needs  identified. CM deferred visit and assessment as pt requested to not be disturbed while she napped. Will assess tomorrow.     Plan  SW/CM - Recommendations for Discharge: Home   PT - Recommendations for Discharge: Home with Home therapy    OT - Recommendations for Discharge: Home without therapy needs    SLP - Recommendations for Discharge:     Anticipate patient will not need post-hospital services. Necessary services are available.  Anticipate patient can return to the environment from which patient entered the hospital.   Anticipate patient can provide self-care at discharge.    Refer to SW/CM Flowsheet for Goals and objective data.      No adenopathy or splenomegaly. No cervical or inguinal lymphadenopathy.

## 2023-09-20 NOTE — ED ADULT TRIAGE NOTE - HEIGHT IN CM
Vascular Surgery Outpatient Consultation      Chief Complaint   Patient presents with    Surgical Consult     Right leg - foot pain and discoloration. Pain with walking. Reason for Consult:  Right lower extremity pain    Requesting Physician:  Dr. Anant Stringer:                The patient is a 77 y.o. male who states a 1 year history of right lower extremity pain. He denies any injury to the leg prior to the pain starting. He was referred here from Dr Alicia Vasquez due to decreased pedal pulses. He has the sensation that he is walking on nails; the pain is worse with standing or walking. When he walks further distances, he develops a tingling and numb sensation to the entire right leg and also has an aching pain to his left lower back. If he sits for extended periods of time he will also get the numbness down the leg. He is scheduled for an MRI of the lumbar spine and then will follow up with Dr Alicia Vasquez for the results and discuss the possibility of any intervention. He has no vascular claudication or nonhealing ulcerations to the feet. He has history of multiple venous procedures on the LLE in the past including stripping by Dr Flavio Nicole. He states he has a lot of scar tissue from the previous procedures. He wears compression stockings which help some. No previous history of DVT or previous arterial procedures on the legs. He has never smoked. He takes daily 81 mg asa and statin medication. He follows with podiatry multiple times a year, Dr Seferino Kinsey, for nail trimmings.     Past Medical History:        Diagnosis Date    CAD (coronary artery disease)     Hyperlipidemia     Hypertension     Myocardial infarct [facility])     Osteoarthritis      Past Surgical History:        Procedure Laterality Date    CARDIAC CATHETERIZATION  10/22/2020    Dr. Ofelia Barajas- EF 50-55%, PTA Distal RCA    COLONOSCOPY      CORONARY ANGIOPLASTY      CORONARY ANGIOPLASTY Right 06/06/2017    Dr. Nerissa Lai PTCA-RCA-Rt
165.1
